# Patient Record
Sex: FEMALE | Race: OTHER | NOT HISPANIC OR LATINO | URBAN - METROPOLITAN AREA
[De-identification: names, ages, dates, MRNs, and addresses within clinical notes are randomized per-mention and may not be internally consistent; named-entity substitution may affect disease eponyms.]

---

## 2018-07-18 ENCOUNTER — OUTPATIENT (OUTPATIENT)
Dept: OUTPATIENT SERVICES | Facility: HOSPITAL | Age: 83
LOS: 1 days | End: 2018-07-18
Payer: MEDICARE

## 2018-07-18 ENCOUNTER — APPOINTMENT (OUTPATIENT)
Dept: RADIOLOGY | Facility: CLINIC | Age: 83
End: 2018-07-18
Payer: MEDICARE

## 2018-07-18 DIAGNOSIS — Z00.8 ENCOUNTER FOR OTHER GENERAL EXAMINATION: ICD-10-CM

## 2018-07-18 PROCEDURE — 73562 X-RAY EXAM OF KNEE 3: CPT | Mod: 26,50

## 2018-07-18 PROCEDURE — 73562 X-RAY EXAM OF KNEE 3: CPT

## 2019-09-17 ENCOUNTER — APPOINTMENT (OUTPATIENT)
Dept: ORTHOPEDIC SURGERY | Facility: CLINIC | Age: 84
End: 2019-09-17
Payer: MEDICARE

## 2019-09-17 VITALS
HEART RATE: 79 BPM | BODY MASS INDEX: 27.42 KG/M2 | SYSTOLIC BLOOD PRESSURE: 151 MMHG | HEIGHT: 59 IN | DIASTOLIC BLOOD PRESSURE: 80 MMHG | WEIGHT: 136 LBS

## 2019-09-17 DIAGNOSIS — M17.12 UNILATERAL PRIMARY OSTEOARTHRITIS, LEFT KNEE: ICD-10-CM

## 2019-09-17 DIAGNOSIS — M17.11 UNILATERAL PRIMARY OSTEOARTHRITIS, RIGHT KNEE: ICD-10-CM

## 2019-09-17 DIAGNOSIS — M25.562 PAIN IN LEFT KNEE: ICD-10-CM

## 2019-09-17 DIAGNOSIS — M25.561 PAIN IN RIGHT KNEE: ICD-10-CM

## 2019-09-17 DIAGNOSIS — Z78.9 OTHER SPECIFIED HEALTH STATUS: ICD-10-CM

## 2019-09-17 PROCEDURE — 72170 X-RAY EXAM OF PELVIS: CPT | Mod: 59

## 2019-09-17 PROCEDURE — 73562 X-RAY EXAM OF KNEE 3: CPT | Mod: 50

## 2019-09-17 PROCEDURE — 99203 OFFICE O/P NEW LOW 30 MIN: CPT

## 2019-12-17 ENCOUNTER — RESULT REVIEW (OUTPATIENT)
Age: 84
End: 2019-12-17

## 2019-12-17 ENCOUNTER — APPOINTMENT (OUTPATIENT)
Dept: ULTRASOUND IMAGING | Facility: IMAGING CENTER | Age: 84
End: 2019-12-17
Payer: MEDICARE

## 2019-12-17 ENCOUNTER — OUTPATIENT (OUTPATIENT)
Dept: OUTPATIENT SERVICES | Facility: HOSPITAL | Age: 84
LOS: 1 days | End: 2019-12-17
Payer: MEDICARE

## 2019-12-17 DIAGNOSIS — R92.8 OTHER ABNORMAL AND INCONCLUSIVE FINDINGS ON DIAGNOSTIC IMAGING OF BREAST: ICD-10-CM

## 2019-12-17 PROCEDURE — 77065 DX MAMMO INCL CAD UNI: CPT

## 2019-12-17 PROCEDURE — 88360 TUMOR IMMUNOHISTOCHEM/MANUAL: CPT | Mod: 26

## 2019-12-17 PROCEDURE — A4648: CPT

## 2019-12-17 PROCEDURE — 88342 IMHCHEM/IMCYTCHM 1ST ANTB: CPT

## 2019-12-17 PROCEDURE — 88341 IMHCHEM/IMCYTCHM EA ADD ANTB: CPT | Mod: 26,59

## 2019-12-17 PROCEDURE — 88342 IMHCHEM/IMCYTCHM 1ST ANTB: CPT | Mod: 26,59

## 2019-12-17 PROCEDURE — 19083 BX BREAST 1ST LESION US IMAG: CPT | Mod: RT

## 2019-12-17 PROCEDURE — 77065 DX MAMMO INCL CAD UNI: CPT | Mod: 26,RT

## 2019-12-17 PROCEDURE — 19083 BX BREAST 1ST LESION US IMAG: CPT

## 2019-12-17 PROCEDURE — 88341 IMHCHEM/IMCYTCHM EA ADD ANTB: CPT

## 2019-12-17 PROCEDURE — 88305 TISSUE EXAM BY PATHOLOGIST: CPT | Mod: 26

## 2019-12-17 PROCEDURE — 88360 TUMOR IMMUNOHISTOCHEM/MANUAL: CPT

## 2019-12-17 PROCEDURE — 88305 TISSUE EXAM BY PATHOLOGIST: CPT

## 2020-01-06 ENCOUNTER — APPOINTMENT (OUTPATIENT)
Dept: SURGERY | Facility: CLINIC | Age: 85
End: 2020-01-06
Payer: MEDICARE

## 2020-01-06 PROCEDURE — 99205K: CUSTOM

## 2020-01-16 ENCOUNTER — OUTPATIENT (OUTPATIENT)
Dept: OUTPATIENT SERVICES | Facility: HOSPITAL | Age: 85
LOS: 1 days | End: 2020-01-16
Payer: MEDICARE

## 2020-01-16 ENCOUNTER — OUTPATIENT (OUTPATIENT)
Dept: OUTPATIENT SERVICES | Facility: HOSPITAL | Age: 85
LOS: 1 days | End: 2020-01-16

## 2020-01-16 ENCOUNTER — APPOINTMENT (OUTPATIENT)
Dept: ULTRASOUND IMAGING | Facility: IMAGING CENTER | Age: 85
End: 2020-01-16

## 2020-01-16 VITALS
HEART RATE: 77 BPM | HEIGHT: 59 IN | TEMPERATURE: 99 F | SYSTOLIC BLOOD PRESSURE: 118 MMHG | DIASTOLIC BLOOD PRESSURE: 80 MMHG | RESPIRATION RATE: 14 BRPM | OXYGEN SATURATION: 96 % | WEIGHT: 138.01 LBS

## 2020-01-16 DIAGNOSIS — Z90.710 ACQUIRED ABSENCE OF BOTH CERVIX AND UTERUS: Chronic | ICD-10-CM

## 2020-01-16 DIAGNOSIS — C50.911 MALIGNANT NEOPLASM OF UNSPECIFIED SITE OF RIGHT FEMALE BREAST: ICD-10-CM

## 2020-01-16 DIAGNOSIS — Z98.890 OTHER SPECIFIED POSTPROCEDURAL STATES: Chronic | ICD-10-CM

## 2020-01-16 DIAGNOSIS — Z00.8 ENCOUNTER FOR OTHER GENERAL EXAMINATION: ICD-10-CM

## 2020-01-16 LAB
ALBUMIN SERPL ELPH-MCNC: 4.1 G/DL — SIGNIFICANT CHANGE UP (ref 3.3–5)
ALP SERPL-CCNC: 75 U/L — SIGNIFICANT CHANGE UP (ref 40–120)
ALT FLD-CCNC: 12 U/L — SIGNIFICANT CHANGE UP (ref 4–33)
ANION GAP SERPL CALC-SCNC: 13 MMO/L — SIGNIFICANT CHANGE UP (ref 7–14)
AST SERPL-CCNC: 17 U/L — SIGNIFICANT CHANGE UP (ref 4–32)
BILIRUB SERPL-MCNC: 0.7 MG/DL — SIGNIFICANT CHANGE UP (ref 0.2–1.2)
BUN SERPL-MCNC: 13 MG/DL — SIGNIFICANT CHANGE UP (ref 7–23)
CALCIUM SERPL-MCNC: 9.9 MG/DL — SIGNIFICANT CHANGE UP (ref 8.4–10.5)
CHLORIDE SERPL-SCNC: 99 MMOL/L — SIGNIFICANT CHANGE UP (ref 98–107)
CO2 SERPL-SCNC: 26 MMOL/L — SIGNIFICANT CHANGE UP (ref 22–31)
CREAT SERPL-MCNC: 0.66 MG/DL — SIGNIFICANT CHANGE UP (ref 0.5–1.3)
GLUCOSE SERPL-MCNC: 84 MG/DL — SIGNIFICANT CHANGE UP (ref 70–99)
HCT VFR BLD CALC: 43 % — SIGNIFICANT CHANGE UP (ref 34.5–45)
HGB BLD-MCNC: 14.1 G/DL — SIGNIFICANT CHANGE UP (ref 11.5–15.5)
MCHC RBC-ENTMCNC: 29.3 PG — SIGNIFICANT CHANGE UP (ref 27–34)
MCHC RBC-ENTMCNC: 32.8 % — SIGNIFICANT CHANGE UP (ref 32–36)
MCV RBC AUTO: 89.4 FL — SIGNIFICANT CHANGE UP (ref 80–100)
NRBC # FLD: 0 K/UL — SIGNIFICANT CHANGE UP (ref 0–0)
PLATELET # BLD AUTO: 160 K/UL — SIGNIFICANT CHANGE UP (ref 150–400)
PMV BLD: 11.9 FL — SIGNIFICANT CHANGE UP (ref 7–13)
POTASSIUM SERPL-MCNC: 3.8 MMOL/L — SIGNIFICANT CHANGE UP (ref 3.5–5.3)
POTASSIUM SERPL-SCNC: 3.8 MMOL/L — SIGNIFICANT CHANGE UP (ref 3.5–5.3)
PROT SERPL-MCNC: 6.9 G/DL — SIGNIFICANT CHANGE UP (ref 6–8.3)
RBC # BLD: 4.81 M/UL — SIGNIFICANT CHANGE UP (ref 3.8–5.2)
RBC # FLD: 13.7 % — SIGNIFICANT CHANGE UP (ref 10.3–14.5)
SODIUM SERPL-SCNC: 138 MMOL/L — SIGNIFICANT CHANGE UP (ref 135–145)
WBC # BLD: 5.69 K/UL — SIGNIFICANT CHANGE UP (ref 3.8–10.5)
WBC # FLD AUTO: 5.69 K/UL — SIGNIFICANT CHANGE UP (ref 3.8–10.5)

## 2020-01-16 PROCEDURE — C1739: CPT

## 2020-01-16 PROCEDURE — 19285 PERQ DEV BREAST 1ST US IMAG: CPT

## 2020-01-16 PROCEDURE — 19285 PERQ DEV BREAST 1ST US IMAG: CPT | Mod: RT

## 2020-01-16 RX ORDER — SODIUM CHLORIDE 9 MG/ML
1000 INJECTION, SOLUTION INTRAVENOUS
Refills: 0 | Status: DISCONTINUED | OUTPATIENT
Start: 2020-01-28 | End: 2020-02-17

## 2020-01-16 NOTE — H&P PST ADULT - HISTORY OF PRESENT ILLNESS
85 year female presents to presurgical testing with diagnosis of malignant neoplasm of unspecified site of right female breast scheduled for right partial mastectomy with seed localization sentinel lymph node biopsy for 1/28/20. Pt with abnormal mammogram, s/p abnormal breast biopsy. Pt denies breast lumps or nipple discharge.

## 2020-01-16 NOTE — H&P PST ADULT - NEGATIVE OPHTHALMOLOGIC SYMPTOMS
no blurred vision L/no pain L/no diplopia/no blurred vision R/no pain R/no loss of vision L/no loss of vision R/no photophobia

## 2020-01-16 NOTE — H&P PST ADULT - RADIOLOGY RESULTS AND INTERPRETATION
CXR ordered as per surgeon CXR in chart from PCP from 11/2019, called Dr Flores office spoke to surgical coordinator and stated no further CXR is needed

## 2020-01-16 NOTE — H&P PST ADULT - SOURCE OF INFORMATION, PROFILE
julio. Pt is Upper sorbian speaking accompanied by daughter. Offered interpretation services but patient declined and preferred to use daughter for interpretation/patient

## 2020-01-16 NOTE — H&P PST ADULT - ASSESSMENT
malignant neoplasm of unspecified site of right female breast Problem: malignant neoplasm of unspecified site of right female breast   Assessment and Plan: Pt is scheduled for right partial mastectomy with seed localization sentinel lymph node biopsy for 1/28/20. Pre-op instructions provided. Pt given verbal and written instructions with teach back on chlorhexidine shampoo and pepcid. Pt verbalized understanding with return demonstration.     Consider obtaining a copy of recent echo from PCP.    Problem: Asthma  Assessment and Plan: Pt instructed to use advair on the morning of procedure.

## 2020-01-16 NOTE — H&P PST ADULT - RS GEN PE MLT RESP DETAILS PC
airway patent/respirations non-labored/good air movement/no rhonchi/clear to auscultation bilaterally/breath sounds equal/no wheezes/no rales

## 2020-01-16 NOTE — H&P PST ADULT - NEGATIVE GASTROINTESTINAL SYMPTOMS
no change in bowel habits/no nausea/no diarrhea/no vomiting/no constipation/no abdominal pain/no melena

## 2020-01-16 NOTE — H&P PST ADULT - LANGUAGE ASSISTANCE NEEDED
No-Patient/Caregiver offered and refused free interpretation services. Sue # [id number]/No-Patient/Caregiver offered and refused free interpretation services.

## 2020-01-16 NOTE — H&P PST ADULT - NSANTHOSAYNRD_GEN_A_CORE
No. KEANU screening performed.  STOP BANG Legend: 0-2 = LOW Risk; 3-4 = INTERMEDIATE Risk; 5-8 = HIGH Risk

## 2020-01-16 NOTE — H&P PST ADULT - NSICDXPASTMEDICALHX_GEN_ALL_CORE_FT
PAST MEDICAL HISTORY:  Arthritis     Asthma     Malignant neoplasm of unspecified site of right female breast

## 2020-01-16 NOTE — H&P PST ADULT - NEGATIVE NEUROLOGICAL SYMPTOMS
no difficulty walking/no transient paralysis/no generalized seizures/no weakness/no paresthesias/no syncope/no tremors/no headache

## 2020-01-16 NOTE — H&P PST ADULT - NSICDXPROBLEM_GEN_ALL_CORE_FT
PROBLEM DIAGNOSES  Problem: Malignant neoplasm of unspecified site of right female breast  Assessment and Plan: Pt is scheduled for right partial mastectomy with seed localization sentinel lymph node biopsy for 1/28/20. Pre-op instructions provided. Pt and daughter given verbal and written instructions with teach back on chlorhexidine shampoo and pepcid. Pt and daughter verbalized understanding with return demonstration.   Consider obtaining last echo from about 3 months ago from

## 2020-01-16 NOTE — H&P PST ADULT - NEGATIVE ENMT SYMPTOMS
no tinnitus/no sinus symptoms/no hearing difficulty/no ear pain/no throat pain/no nose bleeds/no dysphagia/no vertigo

## 2020-01-24 PROBLEM — M19.90 UNSPECIFIED OSTEOARTHRITIS, UNSPECIFIED SITE: Chronic | Status: ACTIVE | Noted: 2020-01-16

## 2020-01-24 PROBLEM — J45.909 UNSPECIFIED ASTHMA, UNCOMPLICATED: Chronic | Status: ACTIVE | Noted: 2020-01-16

## 2020-01-24 PROBLEM — C50.911 MALIGNANT NEOPLASM OF UNSPECIFIED SITE OF RIGHT FEMALE BREAST: Chronic | Status: ACTIVE | Noted: 2020-01-16

## 2020-01-28 ENCOUNTER — APPOINTMENT (OUTPATIENT)
Dept: SURGERY | Facility: HOSPITAL | Age: 85
End: 2020-01-28

## 2020-01-28 ENCOUNTER — OUTPATIENT (OUTPATIENT)
Dept: OUTPATIENT SERVICES | Facility: HOSPITAL | Age: 85
LOS: 1 days | Discharge: HOME | End: 2020-01-28
Payer: MEDICARE

## 2020-01-28 ENCOUNTER — APPOINTMENT (OUTPATIENT)
Dept: NUCLEAR MEDICINE | Facility: HOSPITAL | Age: 85
End: 2020-01-28

## 2020-01-28 ENCOUNTER — RESULT REVIEW (OUTPATIENT)
Age: 85
End: 2020-01-28

## 2020-01-28 VITALS
DIASTOLIC BLOOD PRESSURE: 62 MMHG | OXYGEN SATURATION: 95 % | TEMPERATURE: 98 F | HEART RATE: 74 BPM | SYSTOLIC BLOOD PRESSURE: 142 MMHG | RESPIRATION RATE: 19 BRPM

## 2020-01-28 VITALS
OXYGEN SATURATION: 96 % | TEMPERATURE: 98 F | HEART RATE: 79 BPM | WEIGHT: 138.01 LBS | DIASTOLIC BLOOD PRESSURE: 74 MMHG | SYSTOLIC BLOOD PRESSURE: 154 MMHG | RESPIRATION RATE: 18 BRPM | HEIGHT: 59 IN

## 2020-01-28 DIAGNOSIS — Z98.890 OTHER SPECIFIED POSTPROCEDURAL STATES: Chronic | ICD-10-CM

## 2020-01-28 DIAGNOSIS — C50.911 MALIGNANT NEOPLASM OF UNSPECIFIED SITE OF RIGHT FEMALE BREAST: ICD-10-CM

## 2020-01-28 DIAGNOSIS — Z90.710 ACQUIRED ABSENCE OF BOTH CERVIX AND UTERUS: Chronic | ICD-10-CM

## 2020-01-28 DIAGNOSIS — J45.909 UNSPECIFIED ASTHMA, UNCOMPLICATED: ICD-10-CM

## 2020-01-28 PROCEDURE — 88305 TISSUE EXAM BY PATHOLOGIST: CPT | Mod: 26

## 2020-01-28 PROCEDURE — 19301K: CUSTOM | Mod: RT

## 2020-01-28 PROCEDURE — 38525K: CUSTOM | Mod: RT

## 2020-01-28 PROCEDURE — 38792K: CUSTOM | Mod: RT

## 2020-01-28 PROCEDURE — 76098 X-RAY EXAM SURGICAL SPECIMEN: CPT | Mod: 26

## 2020-01-28 PROCEDURE — 88307 TISSUE EXAM BY PATHOLOGIST: CPT | Mod: 26

## 2020-01-28 NOTE — BRIEF OPERATIVE NOTE - OPERATION/FINDINGS
Right breast partial mastectomy via Perla  localization; incision over lesion on lateral breast. Rainelle lymph node biopsy via axillary incision. Post excision radiography confirmed both perla  and biopsy clip within specimen.

## 2020-01-28 NOTE — ASU PATIENT PROFILE, ADULT - LANGUAGE ASSISTANCE NEEDED
Sue # [id number] / Peggy #[phone] on day of surgery/No-Patient/Caregiver offered and refused free interpretation services.

## 2020-01-28 NOTE — ASU DISCHARGE PLAN (ADULT/PEDIATRIC) - CALL YOUR DOCTOR IF YOU HAVE ANY OF THE FOLLOWING:
Swelling that gets worse/Fever greater than (need to indicate Fahrenheit or Celsius)/Wound/Surgical Site with redness, or foul smelling discharge or pus

## 2020-01-28 NOTE — ASU DISCHARGE PLAN (ADULT/PEDIATRIC) - ASU DC SPECIAL INSTRUCTIONSFT
Please see pre-printed instructions from your surgeon.     Please call your surgeon to schedule your follow up appointment for one week from your operation. Please see pre-printed instructions from your surgeon.     Please call your surgeon to schedule your follow up appointment for one week from your operation. You received iv tylenol in or You must not take tylenol until after 430 pm

## 2020-01-28 NOTE — ASU DISCHARGE PLAN (ADULT/PEDIATRIC) - CARE PROVIDER_API CALL
Yudi Flores)  [facility]  [address] [facility], [address]  [place], [place] [id number]  Phone: [phone]  Fax: [phone]  Follow Up Time:

## 2020-01-30 LAB — SURGICAL PATHOLOGY STUDY: SIGNIFICANT CHANGE UP

## 2020-02-06 ENCOUNTER — APPOINTMENT (OUTPATIENT)
Dept: SURGERY | Facility: CLINIC | Age: 85
End: 2020-02-06
Payer: MEDICARE

## 2020-02-06 PROCEDURE — 99024 POSTOP FOLLOW-UP VISIT: CPT

## 2020-02-12 ENCOUNTER — OUTPATIENT (OUTPATIENT)
Dept: OUTPATIENT SERVICES | Facility: HOSPITAL | Age: 85
LOS: 1 days | Discharge: HOME | End: 2020-02-12

## 2020-02-12 DIAGNOSIS — Z98.890 OTHER SPECIFIED POSTPROCEDURAL STATES: Chronic | ICD-10-CM

## 2020-02-12 DIAGNOSIS — Z90.710 ACQUIRED ABSENCE OF BOTH CERVIX AND UTERUS: Chronic | ICD-10-CM

## 2020-02-12 DIAGNOSIS — C50.911 MALIGNANT NEOPLASM OF UNSPECIFIED SITE OF RIGHT FEMALE BREAST: ICD-10-CM

## 2020-02-18 ENCOUNTER — APPOINTMENT (OUTPATIENT)
Dept: HEMATOLOGY ONCOLOGY | Facility: CLINIC | Age: 85
End: 2020-02-18
Payer: MEDICARE

## 2020-02-18 VITALS
RESPIRATION RATE: 16 BRPM | HEIGHT: 59.84 IN | DIASTOLIC BLOOD PRESSURE: 83 MMHG | WEIGHT: 137.77 LBS | SYSTOLIC BLOOD PRESSURE: 146 MMHG | TEMPERATURE: 99.3 F | OXYGEN SATURATION: 96 % | BODY MASS INDEX: 27.05 KG/M2 | HEART RATE: 80 BPM

## 2020-02-18 DIAGNOSIS — C50.919 MALIGNANT NEOPLASM OF UNSPECIFIED SITE OF UNSPECIFIED FEMALE BREAST: ICD-10-CM

## 2020-02-18 PROCEDURE — 99205 OFFICE O/P NEW HI 60 MIN: CPT

## 2021-01-05 ENCOUNTER — RESULT REVIEW (OUTPATIENT)
Age: 86
End: 2021-01-05

## 2021-01-05 ENCOUNTER — OUTPATIENT (OUTPATIENT)
Dept: OUTPATIENT SERVICES | Facility: HOSPITAL | Age: 86
LOS: 1 days | End: 2021-01-05
Payer: MEDICARE

## 2021-01-05 ENCOUNTER — APPOINTMENT (OUTPATIENT)
Dept: ULTRASOUND IMAGING | Facility: IMAGING CENTER | Age: 86
End: 2021-01-05
Payer: MEDICARE

## 2021-01-05 ENCOUNTER — APPOINTMENT (OUTPATIENT)
Dept: MAMMOGRAPHY | Facility: IMAGING CENTER | Age: 86
End: 2021-01-05
Payer: MEDICARE

## 2021-01-05 DIAGNOSIS — Z98.890 OTHER SPECIFIED POSTPROCEDURAL STATES: Chronic | ICD-10-CM

## 2021-01-05 DIAGNOSIS — Z90.710 ACQUIRED ABSENCE OF BOTH CERVIX AND UTERUS: Chronic | ICD-10-CM

## 2021-01-05 DIAGNOSIS — Z00.8 ENCOUNTER FOR OTHER GENERAL EXAMINATION: ICD-10-CM

## 2021-01-05 PROCEDURE — 76641 ULTRASOUND BREAST COMPLETE: CPT

## 2021-01-05 PROCEDURE — 77066 DX MAMMO INCL CAD BI: CPT | Mod: 26

## 2021-01-05 PROCEDURE — 76641 ULTRASOUND BREAST COMPLETE: CPT | Mod: 26,50

## 2021-01-05 PROCEDURE — G0279: CPT

## 2021-01-05 PROCEDURE — 77066 DX MAMMO INCL CAD BI: CPT

## 2021-01-05 PROCEDURE — G0279: CPT | Mod: 26

## 2022-02-28 ENCOUNTER — APPOINTMENT (OUTPATIENT)
Dept: SURGERY | Facility: CLINIC | Age: 87
End: 2022-02-28
Payer: MEDICARE

## 2022-02-28 PROCEDURE — 99213K: CUSTOM

## 2022-03-07 ENCOUNTER — APPOINTMENT (OUTPATIENT)
Dept: ULTRASOUND IMAGING | Facility: IMAGING CENTER | Age: 87
End: 2022-03-07
Payer: MEDICARE

## 2022-03-07 ENCOUNTER — APPOINTMENT (OUTPATIENT)
Dept: MAMMOGRAPHY | Facility: IMAGING CENTER | Age: 87
End: 2022-03-07
Payer: MEDICARE

## 2022-03-07 ENCOUNTER — OUTPATIENT (OUTPATIENT)
Dept: OUTPATIENT SERVICES | Facility: HOSPITAL | Age: 87
LOS: 1 days | End: 2022-03-07
Payer: MEDICARE

## 2022-03-07 DIAGNOSIS — Z00.8 ENCOUNTER FOR OTHER GENERAL EXAMINATION: ICD-10-CM

## 2022-03-07 DIAGNOSIS — Z98.890 OTHER SPECIFIED POSTPROCEDURAL STATES: Chronic | ICD-10-CM

## 2022-03-07 DIAGNOSIS — Z90.710 ACQUIRED ABSENCE OF BOTH CERVIX AND UTERUS: Chronic | ICD-10-CM

## 2022-03-07 PROCEDURE — 77066 DX MAMMO INCL CAD BI: CPT | Mod: 26

## 2022-03-07 PROCEDURE — 76641 ULTRASOUND BREAST COMPLETE: CPT | Mod: 26,50

## 2022-03-07 PROCEDURE — 76641 ULTRASOUND BREAST COMPLETE: CPT

## 2022-03-07 PROCEDURE — G0279: CPT | Mod: 26

## 2022-03-07 PROCEDURE — 77066 DX MAMMO INCL CAD BI: CPT

## 2022-03-07 PROCEDURE — G0279: CPT

## 2022-04-23 ENCOUNTER — TRANSCRIPTION ENCOUNTER (OUTPATIENT)
Age: 87
End: 2022-04-23

## 2022-04-25 ENCOUNTER — APPOINTMENT (OUTPATIENT)
Dept: ORTHOPEDIC SURGERY | Facility: CLINIC | Age: 87
End: 2022-04-25
Payer: MEDICARE

## 2022-04-25 ENCOUNTER — NON-APPOINTMENT (OUTPATIENT)
Age: 87
End: 2022-04-25

## 2022-04-25 VITALS
SYSTOLIC BLOOD PRESSURE: 126 MMHG | DIASTOLIC BLOOD PRESSURE: 79 MMHG | WEIGHT: 130 LBS | BODY MASS INDEX: 27.29 KG/M2 | HEART RATE: 75 BPM | HEIGHT: 58 IN

## 2022-04-25 DIAGNOSIS — Z85.3 PERSONAL HISTORY OF MALIGNANT NEOPLASM OF BREAST: ICD-10-CM

## 2022-04-25 DIAGNOSIS — M51.37 OTHER INTERVERTEBRAL DISC DEGENERATION, LUMBOSACRAL REGION: ICD-10-CM

## 2022-04-25 DIAGNOSIS — M84.48XA PATHOLOGICAL FRACTURE, OTHER SITE, INITIAL ENCOUNTER FOR FRACTURE: ICD-10-CM

## 2022-04-25 DIAGNOSIS — M54.50 LOW BACK PAIN, UNSPECIFIED: ICD-10-CM

## 2022-04-25 PROCEDURE — 96372 THER/PROPH/DIAG INJ SC/IM: CPT

## 2022-04-25 PROCEDURE — 99214 OFFICE O/P EST MOD 30 MIN: CPT | Mod: 25

## 2022-04-25 RX ORDER — FLUTICASONE PROPIONATE AND SALMETEROL 50; 250 UG/1; UG/1
250-50 POWDER RESPIRATORY (INHALATION)
Qty: 60 | Refills: 0 | Status: ACTIVE | COMMUNITY
Start: 2021-01-13

## 2022-04-25 RX ORDER — CELECOXIB 100 MG/1
100 CAPSULE ORAL TWICE DAILY
Qty: 60 | Refills: 0 | Status: ACTIVE | COMMUNITY
Start: 2022-04-25 | End: 1900-01-01

## 2022-04-25 RX ORDER — DICLOFENAC SODIUM 1% 10 MG/G
1 GEL TOPICAL
Qty: 100 | Refills: 0 | Status: ACTIVE | COMMUNITY
Start: 2022-02-04

## 2022-04-25 RX ORDER — ANASTROZOLE TABLETS 1 MG/1
1 TABLET ORAL DAILY
Qty: 30 | Refills: 4 | Status: DISCONTINUED | COMMUNITY
Start: 2020-02-18 | End: 2022-04-25

## 2022-04-25 RX ORDER — DICLOFENAC SODIUM 3 G/100G
3 GEL TOPICAL TWICE DAILY
Qty: 1 | Refills: 0 | Status: ACTIVE | COMMUNITY
Start: 2022-04-25 | End: 1900-01-01

## 2022-04-25 RX ORDER — CIPROFLOXACIN HYDROCHLORIDE 500 MG/1
500 TABLET, FILM COATED ORAL
Qty: 20 | Refills: 0 | Status: DISCONTINUED | COMMUNITY
Start: 2022-01-20 | End: 2022-04-25

## 2022-04-25 RX ORDER — KETOROLAC TROMETHAMINE 30 MG/ML
30 INJECTION, SOLUTION INTRAMUSCULAR; INTRAVENOUS
Qty: 1 | Refills: 0 | Status: COMPLETED | OUTPATIENT
Start: 2022-04-25

## 2022-04-25 RX ORDER — CELECOXIB 50 MG/1
CAPSULE ORAL
Refills: 0 | Status: DISCONTINUED | COMMUNITY
End: 2022-04-25

## 2022-04-25 RX ORDER — ZOLPIDEM TARTRATE 5 MG/1
TABLET, FILM COATED ORAL
Refills: 0 | Status: DISCONTINUED | COMMUNITY
End: 2022-04-25

## 2022-04-25 RX ORDER — TIZANIDINE 2 MG/1
2 TABLET ORAL EVERY 8 HOURS
Qty: 30 | Refills: 2 | Status: ACTIVE | COMMUNITY
Start: 2022-04-25 | End: 1900-01-01

## 2022-04-25 RX ORDER — TIZANIDINE 2 MG/1
2 TABLET ORAL EVERY 8 HOURS
Qty: 30 | Refills: 2 | Status: DISCONTINUED | COMMUNITY
Start: 2022-04-25 | End: 2022-04-25

## 2022-04-25 RX ORDER — CYCLOBENZAPRINE HYDROCHLORIDE 5 MG/1
5 TABLET, FILM COATED ORAL
Qty: 14 | Refills: 0 | Status: ACTIVE | COMMUNITY
Start: 2022-04-21

## 2022-04-25 RX ORDER — FLUTICASONE PROPIONATE AND SALMETEROL XINAFOATE 45; 21 UG/1; UG/1
45-21 AEROSOL, METERED RESPIRATORY (INHALATION)
Qty: 12 | Refills: 0 | Status: ACTIVE | COMMUNITY
Start: 2022-02-04

## 2022-04-25 RX ADMIN — KETOROLAC TROMETHAMINE 0 MG/ML: 30 INJECTION, SOLUTION INTRAMUSCULAR; INTRAVENOUS at 00:00

## 2022-04-25 NOTE — DISCUSSION/SUMMARY
[Medication Risks Reviewed] : Medication risks reviewed [de-identified] : Patient presents with symptoms consistent with low back pain in the setting of a fall at home approximately 10 days ago.  X-rays obtained previously demonstrate suggestion of possible inferior endplate compression deformity of the L2 vertebral body.  Prescribed her lumbosacral orthosis.  Recommend MRI lumbar spine for further evaluation.  Offered injection of Toradol for her pain and she wanted to proceed.  Under sterile conditions 30 mg Toradol administered intermuscularly by the LPN without incident.  Prescribed her Celebrex and tizanidine for symptomatic relief.  At her request also prescribed her diclofenac gel.  Prescribed her lumbosacral orthosis.  Further treatment option including kyphoplasty may be considered after the MRI is been performed.

## 2022-04-25 NOTE — PHYSICAL EXAM
[Stooped] : stooped [LE] : Sensory: Intact in bilateral lower extremities [0] : left ankle jerk 0 [DP] : dorsalis pedis 2+ and symmetric bilaterally [SLR] : negative straight leg raise [Plantar Reflex Right Only] : absent on the right [Plantar Reflex Left Only] : absent on the left [DTR Reflexes Clonus Of Right Ankle (___ Beats)] : absent on the right [DTR Reflexes Clonus Of Left Ankle (___ Beats)] : absent on the left [de-identified] : The pt is awake, alert and oriented to self, place and time, is comfortable and in no acute distress. Inspection of neck, back and lower extremities bilaterally reveals no rashes or ecchymotic lesions.  There is no obvious abnormal spinal curvature in the sagittal and coronal planes. There is no tenderness over the cervical, thoracic spine, or the upper and lower extremities musculature. There is no sacroiliac tenderness. No greater trochanteric tenderness bilaterally. No atrophy or abnormal movements noted in the upper or lower extremities. There is no swelling noted in the upper or lower extremities bilaterally. No cervical lymphadenopathy noted anteriorly. No joint laxity noted in the upper and lower extremity joints bilaterally.\par Hip range of motion is degrees internal rotation 30° external rotation without pain. Full range of motion of the shoulders bilaterally with no significant pain\par There is no groin pain with hip internal rotation and a negative JONATAN test bilaterally.  [de-identified] : seen with her daughter\par tenderness over lumbar spine miline and right buttock and paraspinal lumbar [de-identified] : AP and lateral image of the lumbar spine On April 21, 2022 at outside facility demonstrates normal lumbar lordosis with degenerative changes and spondylolisthesis grade 1 at L4-5 and L5-S1.  Degeneration also noted L3-4.  Degenerative changes at L2 with loss of inferior endplate compressive deformity approximately 20%.  Age-indeterminate.  Degeneration with retrolisthesis at L1 to and T12-L1.  Thoracolumbar kyphosis.  Anterior osteophytes.  No significant scoliosis.  Lateral osteophytes noted in the thoracic region.

## 2022-04-25 NOTE — HISTORY OF PRESENT ILLNESS
[Worsening] : worsening [10] : a current pain level of 10/10 [Sitting] : sitting [Daily] : ~He/She~ states the symptoms seem to be occuring daily [Prolonged Sitting] : worsened by prolonged sitting [de-identified] : Patient is here today due to falling backwards landed on her buttocks in grass in the garden in their home approximately 10 days ago. Took advil. 2-3 days later  went to Urgent Care xrays were done given medication which she did not take. Patient uses advil and topical pain cream. [de-identified] : sitting to standing then walking [de-identified] : advil and topical rub

## 2022-04-29 ENCOUNTER — APPOINTMENT (OUTPATIENT)
Dept: MRI IMAGING | Facility: IMAGING CENTER | Age: 87
End: 2022-04-29
Payer: MEDICARE

## 2022-04-29 ENCOUNTER — OUTPATIENT (OUTPATIENT)
Dept: OUTPATIENT SERVICES | Facility: HOSPITAL | Age: 87
LOS: 1 days | End: 2022-04-29
Payer: MEDICARE

## 2022-04-29 DIAGNOSIS — Z90.710 ACQUIRED ABSENCE OF BOTH CERVIX AND UTERUS: Chronic | ICD-10-CM

## 2022-04-29 DIAGNOSIS — M51.37 OTHER INTERVERTEBRAL DISC DEGENERATION, LUMBOSACRAL REGION: ICD-10-CM

## 2022-04-29 DIAGNOSIS — Z98.890 OTHER SPECIFIED POSTPROCEDURAL STATES: Chronic | ICD-10-CM

## 2022-04-29 PROCEDURE — 72148 MRI LUMBAR SPINE W/O DYE: CPT | Mod: 26,ME

## 2022-04-29 PROCEDURE — G1004: CPT

## 2022-04-29 PROCEDURE — 72148 MRI LUMBAR SPINE W/O DYE: CPT | Mod: ME

## 2022-05-02 ENCOUNTER — NON-APPOINTMENT (OUTPATIENT)
Age: 87
End: 2022-05-02

## 2022-05-02 DIAGNOSIS — S32.10XA UNSPECIFIED FRACTURE OF SACRUM, INITIAL ENCOUNTER FOR CLOSED FRACTURE: ICD-10-CM

## 2022-05-02 RX ORDER — TRAMADOL HYDROCHLORIDE AND ACETAMINOPHEN 37.5; 325 MG/1; MG/1
37.5-325 TABLET, FILM COATED ORAL
Qty: 20 | Refills: 0 | Status: ACTIVE | COMMUNITY
Start: 2022-05-02 | End: 1900-01-01

## 2022-05-13 ENCOUNTER — APPOINTMENT (OUTPATIENT)
Dept: ORTHOPEDIC SURGERY | Facility: CLINIC | Age: 87
End: 2022-05-13
Payer: MEDICARE

## 2022-05-13 VITALS
DIASTOLIC BLOOD PRESSURE: 82 MMHG | HEART RATE: 89 BPM | SYSTOLIC BLOOD PRESSURE: 127 MMHG | HEIGHT: 58 IN | BODY MASS INDEX: 26.87 KG/M2 | WEIGHT: 128 LBS

## 2022-05-13 DIAGNOSIS — M84.48XS PATHOLOGICAL FRACTURE, OTHER SITE, SEQUELA: ICD-10-CM

## 2022-05-13 DIAGNOSIS — M84.48XD PATHOLOGICAL FRACTURE, OTHER SITE, SUBSEQUENT ENCOUNTER FOR FRACTURE WITH ROUTINE HEALING: ICD-10-CM

## 2022-05-13 PROCEDURE — 99214 OFFICE O/P EST MOD 30 MIN: CPT

## 2022-05-13 RX ORDER — LIDOCAINE 5% 700 MG/1
5 PATCH TOPICAL
Qty: 1 | Refills: 2 | Status: ACTIVE | COMMUNITY
Start: 2022-05-13 | End: 1900-01-01

## 2022-05-13 NOTE — HISTORY OF PRESENT ILLNESS
[Worsening] : worsening [10] : a current pain level of 10/10 [Daily] : ~He/She~ states the symptoms seem to be occuring daily [None] : No relieving factors are noted [de-identified] : Patient is here today with her family members to discuss having surgery on her spine. Patient states in terrible pain wearing back wrap nothing is helping. [de-identified] : everything bothers her

## 2022-05-13 NOTE — PHYSICAL EXAM
[Stooped] : stooped [LE] : Sensory: Intact in bilateral lower extremities [0] : left ankle jerk 0 [DP] : dorsalis pedis 2+ and symmetric bilaterally [SLR] : negative straight leg raise [Plantar Reflex Right Only] : absent on the right [Plantar Reflex Left Only] : absent on the left [DTR Reflexes Clonus Of Right Ankle (___ Beats)] : absent on the right [DTR Reflexes Clonus Of Left Ankle (___ Beats)] : absent on the left [de-identified] : The pt is awake, alert and oriented to self, place and time, is comfortable and in no acute distress. Inspection of neck, back and lower extremities bilaterally reveals no rashes or ecchymotic lesions.  There is no obvious abnormal spinal curvature in the sagittal and coronal planes. There is no tenderness over the cervical, thoracic spine, or the upper and lower extremities musculature. There is no sacroiliac tenderness. No greater trochanteric tenderness bilaterally. No atrophy or abnormal movements noted in the upper or lower extremities. There is no swelling noted in the upper or lower extremities bilaterally. No cervical lymphadenopathy noted anteriorly. No joint laxity noted in the upper and lower extremity joints bilaterally.\par Hip range of motion is degrees internal rotation 30° external rotation without pain. Full range of motion of the shoulders bilaterally with no significant pain\par There is no groin pain with hip internal rotation and a negative JONATAN test bilaterally.  [de-identified] : seen with her sons\par tenderness over lumbar spine midline and right buttock and paraspinal lumbar [de-identified] : INTERPRETATION: MR LUMBAR SPINE AND COCCYX\par \par HISTORY: Injured one week ago with lumbar spine and coccygeal pain.\par \par TECHNIQUE: Multiplanar MRI of the lumbar spine and coccyx was performed without contrast using 10 sequences.\par \par COMPARISON: Lumbar spine x-rays dated 4/21/2022\par \par FINDINGS:\par \par OSSEOUS STRUCTURES\par  Fractures: Slight L1 and mild L2 inferior endplate compression fractures are present with marrow edema reflecting recent injury. Nondisplaced fracture involves the sacrococcygeal junction with marrow edema..\par  Alignment: Mild retrolisthesis is present at T12-L1, L1-L2, and L2-L3. There is slight anterolisthesis at L3-L4 and mild anterolisthesis at L4-L5 and L5-S1. Mild lumbar dextrocurvature is noted.\par  Marrow Signal: L1 hemangioma is noted and there are couple of small scattered Schmorl's nodes in addition to the fracture edema.\par \par SPINAL CORD\par  Signal: Normal.\par  Conus Medullaris: Terminates at L2-L3.\par \par DISC LEVELS\par T12-L1: Mild retrolisthesis is present with moderate to severe loss of disc height but no significant spinal canal stenosis.\par \par L1-L2: Mild retrolisthesis is present with moderate to severe loss of disc height ligamentum flavum hypertrophy contributing to mild spinal canal stenosis. Mild bilateral neural foraminal stenosis is present.\par \par L2-L3: Mild retrolisthesis is present with moderate loss of disc height and mild spinal canal stenosis. Mild bilateral neural foraminal stenosis is present.\par \par L3-L4: Slight anterolisthesis is present with moderate loss of disc height, disc bulging, but no significant central stenosis although there is mild to moderate narrowing of the left lateral recess. Moderate left neural foraminal stenosis is present.\par \par L4-L5: Mild anterolisthesis is present with mild loss of disc height, mild disc bulging, and ligamentum flavum hypertrophy but no significant spinal canal stenosis.\par \par L5-S1: Mild anterolisthesis is present with mild to moderate loss of disc height and mild disc bulging but no spinal canal stenosis. Mild to moderate bilateral neural foraminal stenosis is present. 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 Slight L1 and mild L2 inferior endplate compression fractures are seen with marrow edema reflecting recent injury.\par 2. Nondisplaced fracture also involves the sacrococcygeal junction.\par 3. Multilevel degenerative disc disease.\par 4. L1-L2 and L2-L3 demonstrate mild spinal canal stenosis\par 5. There is variable neural foraminal stenosis.\par \par --- End of Report ---\par \par GERMÁN HARDIN MD; Attending Radiologist\par This document has been electronically signed. Apr 29 2022 2:12PM

## 2022-05-13 NOTE — DISCUSSION/SUMMARY
[Medication Risks Reviewed] : Medication risks reviewed [de-identified] : Discussed option of kyphoplasty at L1, L2. The family would like to think about it and will call if they want to proceed with it. Lidocaine 5% Rx provided.  She will follow-up with me 2 weeks for repeat x-rays and to reassess her symptoms.  Recommend use of a TLSO which she has been finding helpful but not wearing regularly.  She does not report any pain along the coccyx suggesting her pain is primarily related to the L1 to compression fractures right of the sacrococcygeal injury also noted on the MRI.\par \par MRI performed previously was independently reviewed by me and findings discussed with the patient and her children.

## 2022-05-13 NOTE — REASON FOR VISIT
[Follow-Up Visit] : a follow-up visit for [Degenerative Joint Disease] : degenerative joint disease [Back Pain] : back pain [Family Member] : family member [FreeTextEntry2] : L2 compression fracture

## 2022-09-12 ENCOUNTER — OUTPATIENT (OUTPATIENT)
Dept: OUTPATIENT SERVICES | Facility: HOSPITAL | Age: 87
LOS: 1 days | End: 2022-09-12
Payer: MEDICARE

## 2022-09-12 ENCOUNTER — APPOINTMENT (OUTPATIENT)
Dept: MRI IMAGING | Facility: IMAGING CENTER | Age: 87
End: 2022-09-12

## 2022-09-12 DIAGNOSIS — G45.9 TRANSIENT CEREBRAL ISCHEMIC ATTACK, UNSPECIFIED: ICD-10-CM

## 2022-09-12 DIAGNOSIS — Z90.710 ACQUIRED ABSENCE OF BOTH CERVIX AND UTERUS: Chronic | ICD-10-CM

## 2022-09-12 DIAGNOSIS — Z98.890 OTHER SPECIFIED POSTPROCEDURAL STATES: Chronic | ICD-10-CM

## 2022-09-12 PROCEDURE — 70551 MRI BRAIN STEM W/O DYE: CPT

## 2022-09-12 PROCEDURE — 70551 MRI BRAIN STEM W/O DYE: CPT | Mod: 26,MH

## 2022-11-27 ENCOUNTER — INPATIENT (INPATIENT)
Facility: HOSPITAL | Age: 87
LOS: 1 days | Discharge: HOME | DRG: 57 | End: 2022-11-29
Attending: PSYCHIATRY & NEUROLOGY | Admitting: PSYCHIATRY & NEUROLOGY
Payer: MEDICARE

## 2022-11-27 VITALS
HEART RATE: 74 BPM | OXYGEN SATURATION: 97 % | DIASTOLIC BLOOD PRESSURE: 90 MMHG | WEIGHT: 130.95 LBS | SYSTOLIC BLOOD PRESSURE: 170 MMHG | RESPIRATION RATE: 18 BRPM

## 2022-11-27 DIAGNOSIS — Z98.890 OTHER SPECIFIED POSTPROCEDURAL STATES: Chronic | ICD-10-CM

## 2022-11-27 DIAGNOSIS — Z90.710 ACQUIRED ABSENCE OF BOTH CERVIX AND UTERUS: Chronic | ICD-10-CM

## 2022-11-27 DIAGNOSIS — R41.82 ALTERED MENTAL STATUS, UNSPECIFIED: ICD-10-CM

## 2022-11-27 LAB
ALBUMIN SERPL ELPH-MCNC: 3.9 G/DL — SIGNIFICANT CHANGE UP (ref 3.3–5)
ALP SERPL-CCNC: 90 U/L — SIGNIFICANT CHANGE UP (ref 40–120)
ALT FLD-CCNC: 7 U/L — LOW (ref 10–45)
ANION GAP SERPL CALC-SCNC: 11 MMOL/L — SIGNIFICANT CHANGE UP (ref 5–17)
APPEARANCE UR: CLEAR — SIGNIFICANT CHANGE UP
APTT BLD: 28.1 SEC — SIGNIFICANT CHANGE UP (ref 27.5–35.5)
AST SERPL-CCNC: 12 U/L — SIGNIFICANT CHANGE UP (ref 10–40)
BACTERIA # UR AUTO: NEGATIVE — SIGNIFICANT CHANGE UP
BASOPHILS # BLD AUTO: 0.05 K/UL — SIGNIFICANT CHANGE UP (ref 0–0.2)
BASOPHILS NFR BLD AUTO: 0.8 % — SIGNIFICANT CHANGE UP (ref 0–2)
BILIRUB SERPL-MCNC: 0.5 MG/DL — SIGNIFICANT CHANGE UP (ref 0.2–1.2)
BILIRUB UR-MCNC: NEGATIVE — SIGNIFICANT CHANGE UP
BUN SERPL-MCNC: 13 MG/DL — SIGNIFICANT CHANGE UP (ref 7–23)
CALCIUM SERPL-MCNC: 9.7 MG/DL — SIGNIFICANT CHANGE UP (ref 8.4–10.5)
CHLORIDE SERPL-SCNC: 102 MMOL/L — SIGNIFICANT CHANGE UP (ref 96–108)
CO2 SERPL-SCNC: 25 MMOL/L — SIGNIFICANT CHANGE UP (ref 22–31)
COLOR SPEC: COLORLESS — SIGNIFICANT CHANGE UP
CREAT SERPL-MCNC: 0.88 MG/DL — SIGNIFICANT CHANGE UP (ref 0.5–1.3)
DIFF PNL FLD: NEGATIVE — SIGNIFICANT CHANGE UP
EGFR: 64 ML/MIN/1.73M2 — SIGNIFICANT CHANGE UP
EOSINOPHIL # BLD AUTO: 0.05 K/UL — SIGNIFICANT CHANGE UP (ref 0–0.5)
EOSINOPHIL NFR BLD AUTO: 0.8 % — SIGNIFICANT CHANGE UP (ref 0–6)
EPI CELLS # UR: 1 /HPF — SIGNIFICANT CHANGE UP
FLUAV AG NPH QL: SIGNIFICANT CHANGE UP
FLUBV AG NPH QL: SIGNIFICANT CHANGE UP
GLUCOSE SERPL-MCNC: 131 MG/DL — HIGH (ref 70–99)
GLUCOSE UR QL: NEGATIVE — SIGNIFICANT CHANGE UP
HCT VFR BLD CALC: 43 % — SIGNIFICANT CHANGE UP (ref 34.5–45)
HGB BLD-MCNC: 14.6 G/DL — SIGNIFICANT CHANGE UP (ref 11.5–15.5)
HYALINE CASTS # UR AUTO: 0 /LPF — SIGNIFICANT CHANGE UP (ref 0–2)
IMM GRANULOCYTES NFR BLD AUTO: 0.2 % — SIGNIFICANT CHANGE UP (ref 0–0.9)
INR BLD: 1.06 RATIO — SIGNIFICANT CHANGE UP (ref 0.88–1.16)
KETONES UR-MCNC: NEGATIVE — SIGNIFICANT CHANGE UP
LEUKOCYTE ESTERASE UR-ACNC: NEGATIVE — SIGNIFICANT CHANGE UP
LYMPHOCYTES # BLD AUTO: 1.94 K/UL — SIGNIFICANT CHANGE UP (ref 1–3.3)
LYMPHOCYTES # BLD AUTO: 30.7 % — SIGNIFICANT CHANGE UP (ref 13–44)
MCHC RBC-ENTMCNC: 30 PG — SIGNIFICANT CHANGE UP (ref 27–34)
MCHC RBC-ENTMCNC: 34 GM/DL — SIGNIFICANT CHANGE UP (ref 32–36)
MCV RBC AUTO: 88.3 FL — SIGNIFICANT CHANGE UP (ref 80–100)
MONOCYTES # BLD AUTO: 0.42 K/UL — SIGNIFICANT CHANGE UP (ref 0–0.9)
MONOCYTES NFR BLD AUTO: 6.7 % — SIGNIFICANT CHANGE UP (ref 2–14)
NEUTROPHILS # BLD AUTO: 3.84 K/UL — SIGNIFICANT CHANGE UP (ref 1.8–7.4)
NEUTROPHILS NFR BLD AUTO: 60.8 % — SIGNIFICANT CHANGE UP (ref 43–77)
NITRITE UR-MCNC: NEGATIVE — SIGNIFICANT CHANGE UP
NRBC # BLD: 0 /100 WBCS — SIGNIFICANT CHANGE UP (ref 0–0)
PH UR: 8 — SIGNIFICANT CHANGE UP (ref 5–8)
PLATELET # BLD AUTO: 167 K/UL — SIGNIFICANT CHANGE UP (ref 150–400)
POTASSIUM SERPL-MCNC: 3.8 MMOL/L — SIGNIFICANT CHANGE UP (ref 3.5–5.3)
POTASSIUM SERPL-SCNC: 3.8 MMOL/L — SIGNIFICANT CHANGE UP (ref 3.5–5.3)
PROT SERPL-MCNC: 6.9 G/DL — SIGNIFICANT CHANGE UP (ref 6–8.3)
PROT UR-MCNC: NEGATIVE — SIGNIFICANT CHANGE UP
PROTHROM AB SERPL-ACNC: 12.3 SEC — SIGNIFICANT CHANGE UP (ref 10.5–13.4)
RBC # BLD: 4.87 M/UL — SIGNIFICANT CHANGE UP (ref 3.8–5.2)
RBC # FLD: 13.6 % — SIGNIFICANT CHANGE UP (ref 10.3–14.5)
RBC CASTS # UR COMP ASSIST: 0 /HPF — SIGNIFICANT CHANGE UP (ref 0–4)
RSV RNA NPH QL NAA+NON-PROBE: SIGNIFICANT CHANGE UP
SARS-COV-2 RNA SPEC QL NAA+PROBE: SIGNIFICANT CHANGE UP
SODIUM SERPL-SCNC: 138 MMOL/L — SIGNIFICANT CHANGE UP (ref 135–145)
SP GR SPEC: 1.01 — SIGNIFICANT CHANGE UP (ref 1.01–1.02)
TROPONIN T, HIGH SENSITIVITY RESULT: 8 NG/L — SIGNIFICANT CHANGE UP (ref 0–51)
UROBILINOGEN FLD QL: NEGATIVE — SIGNIFICANT CHANGE UP
WBC # BLD: 6.31 K/UL — SIGNIFICANT CHANGE UP (ref 3.8–10.5)
WBC # FLD AUTO: 6.31 K/UL — SIGNIFICANT CHANGE UP (ref 3.8–10.5)
WBC UR QL: 2 /HPF — SIGNIFICANT CHANGE UP (ref 0–5)

## 2022-11-27 PROCEDURE — 71045 X-RAY EXAM CHEST 1 VIEW: CPT | Mod: 26

## 2022-11-27 PROCEDURE — 70498 CT ANGIOGRAPHY NECK: CPT | Mod: 26,MA

## 2022-11-27 PROCEDURE — 70496 CT ANGIOGRAPHY HEAD: CPT | Mod: 26,MA

## 2022-11-27 PROCEDURE — 99221 1ST HOSP IP/OBS SF/LOW 40: CPT | Mod: GC

## 2022-11-27 PROCEDURE — 93010 ELECTROCARDIOGRAM REPORT: CPT

## 2022-11-27 PROCEDURE — 99291 CRITICAL CARE FIRST HOUR: CPT

## 2022-11-27 RX ORDER — CELECOXIB 200 MG/1
1 CAPSULE ORAL
Qty: 0 | Refills: 0 | DISCHARGE

## 2022-11-27 RX ORDER — LANOLIN ALCOHOL/MO/W.PET/CERES
3 CREAM (GRAM) TOPICAL AT BEDTIME
Refills: 0 | Status: DISCONTINUED | OUTPATIENT
Start: 2022-11-27 | End: 2022-11-28

## 2022-11-27 RX ORDER — MULTIVIT-MIN/FERROUS GLUCONATE 9 MG/15 ML
1 LIQUID (ML) ORAL
Qty: 0 | Refills: 0 | DISCHARGE

## 2022-11-27 RX ORDER — ASCORBIC ACID 60 MG
1 TABLET,CHEWABLE ORAL
Qty: 0 | Refills: 0 | DISCHARGE

## 2022-11-27 RX ORDER — BRIVARACETAM 25 MG/1
100 TABLET, FILM COATED ORAL ONCE
Refills: 0 | Status: DISCONTINUED | OUTPATIENT
Start: 2022-11-27 | End: 2022-11-29

## 2022-11-27 RX ORDER — ENOXAPARIN SODIUM 100 MG/ML
40 INJECTION SUBCUTANEOUS EVERY 24 HOURS
Refills: 0 | Status: DISCONTINUED | OUTPATIENT
Start: 2022-11-27 | End: 2022-11-29

## 2022-11-27 RX ORDER — CYCLOSPORINE 0.5 MG/ML
1 EMULSION OPHTHALMIC
Qty: 0 | Refills: 0 | DISCHARGE

## 2022-11-27 RX ORDER — FLUTICASONE PROPIONATE AND SALMETEROL 50; 250 UG/1; UG/1
1 POWDER ORAL; RESPIRATORY (INHALATION)
Qty: 0 | Refills: 0 | DISCHARGE

## 2022-11-27 NOTE — STROKE CODE NOTE - NIH STROKE SCALE: 1A. LEVEL OF CONSCIOUSNESS, QM
(0) Alert; keenly responsive
(1) other risk factor (includes escalating BMI, pack-years of smoking, diabetes requiring insulin, chemotherapy, female gender and length of surgery)

## 2022-11-27 NOTE — ED ADULT NURSE NOTE - OBJECTIVE STATEMENT
88 yo female PMH colitis, A&Ox3, BIBEMS for disorientation r/o stroke.  CODE Stroke 1949.  Family reports pt had a 20 min of confusion, became lucid and then another episode of 7 min of confusion prior to ED arrival.  Breathing even and unlabored, able to speak in clear sentences, no facial droop, equal strength, motor, sensory in all exts, abdomen soft nonteder, pedal edema +2.  Pt denies chest pain, palpitations, shortness of breath, headache, visual disturbances, numbness/tingling, fever, chills, diaphoresis,  nausea, vomiting, constipation, diarrhea, or urinary symptoms. 88 yo female PMH colitis, A&Ox3, farsi speaking son at bedside to translate BIBEMS for disorientation r/o stroke.  CODE Stroke 1949.  Family reports pt had a 20 min of confusion, became lucid and then another episode of 7 min of confusion prior to ED arrival.  Breathing even and unlabored, able to speak in clear sentences, no facial droop, equal strength, motor, sensory in all exts, abdomen soft nonteder, pedal edema +2.  Pt denies chest pain, palpitations, shortness of breath, headache, visual disturbances, numbness/tingling, fever, chills, diaphoresis,  nausea, vomiting, constipation, diarrhea, or urinary symptoms.

## 2022-11-27 NOTE — H&P ADULT - ASSESSMENT
ASSESSMENT     IMPRESSION     PLAN  ASSESSMENT   87 right handed female with history of colitis and TIA (MRI 09/2022 negative) presented to the hospital with 20 minutes and 7 minutes of confusion. She was talking to her son when she had an episode where she was asking her son to repeat what he was saying and repeating her words "what are you talking about?" Son noted some associated blank staring episodes.  This episode occurred for twenty minutes including the time that the EMS came. During the episode, she was not able to identify person nor place. She had another episode on the EMS ride on the way the hospital which improved by the time she got to the hospital. CODE STROKE called 1949. NIHSS 1. mRS 3. The patient received CT and CTA which demonstrated no acute hemorrhage nor mass effect. 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 Upon speaking with the patient's family, it was revealed that the patient has had multiple episodes of intermittent staring spells usually lasing one to two minutes. However, this current episode was much longer than before, prompting the hospital transfer.  Not candidate for tPA given no focal symptoms and diagnosis less likely stroke but more likely seizure. Not candidate for thrombectomy because no LVO.     Of note, the patient had a previous episode of one to two minutes staring episodes where she presented to outpatient Neurologist afterwards, who recommended MRI for TIA workup which was negative.      IMPRESSION     PLAN  ASSESSMENT   87 right handed female with history of colitis and TIA (MRI 09/2022 negative) presented to the hospital with 20 minutes and 7 minutes of confusion. Had one previous episode in 09/2022 for which she received an MRI which was negative. In the intern, the patient has had several episodes of staring spells lasting one to two minutes but she did not go to the hospital for these episodes. Given that this episode was longer than previous, the decision was made to send her to the hospital.  CODE STROKE called 1949. NIHSS 1. mRS 3. The patient received CT and CTA which demonstrated no acute hemorrhage nor mass effect. 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 Not candidate for tPA given no focal symptoms and diagnosis less likely stroke but more likely seizure. Not candidate for thrombectomy because no LVO.     IMPRESSION     Multiple episodes of staring spells, more likely focal seizure than stroke given largely negative imaging     PLAN   #NEURO   Seizure   - CBC, CMP, Mg, P, CpK, UA, Utox, ammonia  - MRI head epilepsy protocol   - Lorazepam 1mg IV PRN for seizure activity lasting >3-5mins, >2x/hr, >3x/day, or if GTC , repeat after 1 minute (max dose 0.1 mg/kg)    - Briviact 100mg IV PRN for seizure activity lasting >3 min and refractory to Lorazepam   - video 24hr EEG    - neuro checks, NPO until swallow evaluation, seizure, fall & aspiration precautions   - Please note: if patient has a convulsion, please document length of episode, specifically what patient was doing paying attention to eye opening vs closure, gaze deviation, shaking of extremities, tongue bite, urinary incontinence, any derangement of vital signs. Generalized motor seizures can have dilated and unreactive pupils, absent oculocephalic reflexes (no dolls eyes), and open eyelids (99% of cases). Head turning from sided to side, pelvic thrusting, bicycling movements, hand waving are NOT manifestations of generalized motor seizures.    Fusiform aneurysm  - nsx consult     Insomnia   Takes Zolpidem at home   Start on melatonin but can consider escalating if required     #CARDIAC  HTN   SBP >170   normotensive, consider cardizem if persistently  elevated     #RESPIRATORY   on RA    #FENGI   ctm  Pending s/s   Start regular diet when passes dysphagia screen      #RENAL   ctm     #HEME ONC  lovenox 40      #ETHICS    PT/OT    To be discussed with neurology attending and seen on morning rounds. Recommendations finalized once signed by attending.

## 2022-11-27 NOTE — H&P ADULT - NSHPPHYSICALEXAM_GEN_ALL_CORE
Physical Examination:   General - NAD, pleasant, cooperative but slightly lethargic   Cardiovascular - Peripheral pulses palpable, no edema  Neurologic Exam:  Mental status - Awake, Alert, Oriented to person, place, and time. Speech fluent, repetition and naming intact. Follows simple and complex commands. Attention/concentration, recent and remote memory (including registration and recall), and fund of knowledge intact    Cranial nerves:  CN II: Pupils are 4 mm and briskly reactive to light. BTT  CN III, IV, VI: EOMI, no nystagmus, no ptosis  CN V: Facial sensation is intact to pinprick in all 3 divisions bilaterally.  CN VII: Face is symmetric with normal eye closure and smile.  CN VII: Hearing is normal to rubbing fingers  CN IX, X: Palate elevates symmetrically. Phonation is normal.  CN XI: Head turning and shoulder shrug are intact  CN XII: Tongue is midline with normal movements and no atrophy.    Motor - Normal bulk and tone throughout. No pronator drift of out-stretched arms.  Strength testing            Deltoid      Biceps      Triceps     Wrist Extension    Wrist Flexion     Interossei         R            5                 5               4+                     5                              4+                        -                 5  L             5                 5               4+                     5                              4+                        -                 5              Hip Flexion    Hip Extension    Knee Flexion    Knee Extension    Dorsiflexion    Plantar Flexion  R              5                           5                       5                           5                            5                          5  L              5                           5                        5                           5                            5                          5    Sensation - Light touch intact in fingers and toes     DTR's -             Biceps      Triceps     Brachioradialis      Patellar    Ankle    Toes/plantar response  R             2+             1+                  2+                       2+            1+                 Down  L              2+             1+                 2+                        2+           1+                 Down    Coordination - There is no dysmetria on finger-to-nose and heel-knee-shin. There are no abnormal or extraneous movements.     Gait and station - Ataxic gait

## 2022-11-27 NOTE — ED PROVIDER NOTE - PHYSICAL EXAMINATION
Gen: WDWN, NAD  HEENT: EOMI, no nasal discharge, mucous membranes moist  CV: RRR, 2+ radial pulses L radial  Resp: no accessory muscle use, no increased work of breathing  GI: Abdomen soft non-distended, NTTP  MSK: No open wounds, no bruising, no LE edema  Neuro: A&Ox4, following commands, moving all four extremities spontaneously. CN3-12 intact, EOMI. PERRLA, 5/5 strength in b/l UE/LE.  Sensation intact bl in UE/LE.   Psych: appropriate mood

## 2022-11-27 NOTE — ED PROVIDER NOTE - ATTENDING CONTRIBUTION TO CARE
Elvis Villar MD:   I personally saw the patient and performed a substantive portion of the visit including all aspects of the medical decision making.    MDM: 87-year-old female who was brought in by EMS for disorientation, abnormal speech and elevated blood pressure.  Last known normal was 8 PM, while patient was sitting at home, the son reports that the patient was speaking but not making sense with the content of her speech.  There were 2 episodes in total, the first episode lasting 20 minutes, the last episode was witnessed by EMS approximately 7 to 10 minutes.  EMS also reported possible left facial droop. patient with history of TIA, was supposed to be on aspirin, however has been noncompliant.  No recent falls or injuries.   On examination, patient is ANO x3, no focal neurodeficits, normal speech in Farsi, corroborated with the son at bedside.  Cardiac RRR, lungs clear to auscultation, abdomen soft nontender.  Normal perfusion in all 4 extremities.  Stroke code called, patient seen immediately by ED and Neurology/Stroke team. Patient with evidence of CVA. Will assess for ischemic vs hemorrhagic with stat CT scan. Will also evaluate with CTA for LVO and cerebral/vertebral artery dissection. TPA indications and contraindications, risks and benefits discuss with patient and Stroke team.  However as patient has resolved symptoms, plan to withhold tPA and thrombectomy at this time. Elvis Villar MD:   I personally saw the patient and performed a substantive portion of the visit including all aspects of the medical decision making.    MDM: 87-year-old female who was brought in by EMS for disorientation, abnormal speech and elevated blood pressure.  Last known normal was 8 PM, while patient was sitting at home, the son reports that the patient was speaking but not making sense with the content of her speech.  There were 2 episodes in total, the first episode lasting 20 minutes, the last episode was witnessed by EMS approximately 7 to 10 minutes.  EMS also reported possible left facial droop. patient with history of TIA, was supposed to be on aspirin, however has been noncompliant.  No recent falls or injuries.   On examination, patient is ANO x3, no focal neurodeficits, normal speech in Farsi, corroborated with the son at bedside.  Cardiac RRR, lungs clear to auscultation, abdomen soft nontender.  Normal perfusion in all 4 extremities.  Stroke code called, patient seen immediately by ED and Neurology/Stroke team. Patient with evidence of CVA. Will assess for ischemic vs hemorrhagic with stat CT scan. Will also evaluate with CTA for LVO and cerebral/vertebral artery dissection. TPA indications and contraindications, risks and benefits discuss with patient and Stroke team.  However as patient has resolved symptoms, plan to withhold tPA and thrombectomy at this time.    Imaging and labs reviewed, nonactionable.   The patient will need to be admitted to the hospital for continued evaluation and management, as well as to optimize medical management and to provide outpatient needs assessment.  Discussed with the accepting physician regarding the initial presentation, diagnostic studies, treatments given in the ED, and current plan of care.   The patient was accepted by and endorsed to the Neuro team to EMU.

## 2022-11-27 NOTE — ED PROVIDER NOTE - INTERPRETATION
Sinus rhythm at 75 bpm, normal axis, no ST elevations or depressions.  Isolated T wave inversion in lead III.  QTc 462

## 2022-11-27 NOTE — H&P ADULT - ATTENDING COMMENTS
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

## 2022-11-27 NOTE — H&P ADULT - NSHPLABSRESULTS_GEN_ALL_CORE
LABS: Personally reviewed labs, imaging, and ECG                          14.6   6.31  )-----------( 167      ( 2022 19:53 )             43.0           138  |  102  |  13  ----------------------------<  131<H>  3.8   |  25  |  0.88    Ca    9.7      2022 19:53    TPro  6.9  /  Alb  3.9  /  TBili  0.5  /  DBili  x   /  AST  12  /  ALT  7<L>  /  AlkPhos  90         LIVER FUNCTIONS - ( 2022 19:53 )  Alb: 3.9 g/dL / Pro: 6.9 g/dL / ALK PHOS: 90 U/L / ALT: 7 U/L / AST: 12 U/L / GGT: x                    Urinalysis Basic - ( 2022 20:32 )    Color: Colorless / Appearance: Clear / S.011 / pH: x  Gluc: x / Ketone: Negative  / Bili: Negative / Urobili: Negative   Blood: x / Protein: Negative / Nitrite: Negative   Leuk Esterase: Negative / RBC: 0 /hpf / WBC 2 /HPF   Sq Epi: x / Non Sq Epi: 1 /hpf / Bacteria: Negative        PT/INR - ( 2022 19:53 )   PT: 12.3 sec;   INR: 1.06 ratio         PTT - ( 2022 19:53 )  PTT:28.1 sec    Lactate Trend            CAPILLARY BLOOD GLUCOSE      POCT Blood Glucose.: 117 mg/dL (2022 19:48)            RADIOLOGY & ADDITIONAL TESTS:     < from: Xray Chest 1 View- PORTABLE-Urgent (Xray Chest 1 View- PORTABLE-Urgent .) (22 @ 21:27) >    No focal consolidation.    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 Questionable tiny 1 mm laterally projecting   aneurysm off of the clinoid segment left ICA. No major vessel occlusion,   hemodynamically significant stenosis, or dissection.    Prominent mediastinal adenopathy partially visualized. Consider follow-up   nonemergent chest CT for full evaluation of the mediastinum.    < end of copied text >    < from: CT Brain Stroke Protocol (11.27.22 @ 20:10) >    IMPRESSION:  No acute intracranial hemorrhage, vasogenic edema, extra-axial collection   or hydrocephalus. Small chronic lacunar infarcts in the corona radiata   and thalami and mild microvascular changes.    < end of copied text >

## 2022-11-27 NOTE — STROKE CODE NOTE - IV ALTEPLASE EXCLUSION ABS OTHER
Symptoms of staring episodes with post ictal state more likely of altered mental status secondary to seizure

## 2022-11-27 NOTE — ED PROVIDER NOTE - CLINICAL SUMMARY MEDICAL DECISION MAKING FREE TEXT BOX
Lynda Snider MD, PGY-3: 86YO F hx HTN p/w multiple episodes of disorientation, resolved and back to baseline. vss, pe no neuro deficits. suspect stroke/tia. also consider acute infection. plan for code stroke, neuro cs, admission for mri.

## 2022-11-28 LAB
ALBUMIN SERPL ELPH-MCNC: 3.6 G/DL — SIGNIFICANT CHANGE UP (ref 3.3–5)
ALP SERPL-CCNC: 76 U/L — SIGNIFICANT CHANGE UP (ref 40–120)
ALT FLD-CCNC: 7 U/L — LOW (ref 10–45)
AMMONIA BLD-MCNC: 23 UMOL/L — SIGNIFICANT CHANGE UP (ref 11–55)
ANION GAP SERPL CALC-SCNC: 11 MMOL/L — SIGNIFICANT CHANGE UP (ref 5–17)
AST SERPL-CCNC: 9 U/L — LOW (ref 10–40)
BILIRUB SERPL-MCNC: 1 MG/DL — SIGNIFICANT CHANGE UP (ref 0.2–1.2)
BUN SERPL-MCNC: 11 MG/DL — SIGNIFICANT CHANGE UP (ref 7–23)
CALCIUM SERPL-MCNC: 9.5 MG/DL — SIGNIFICANT CHANGE UP (ref 8.4–10.5)
CHLORIDE SERPL-SCNC: 104 MMOL/L — SIGNIFICANT CHANGE UP (ref 96–108)
CO2 SERPL-SCNC: 23 MMOL/L — SIGNIFICANT CHANGE UP (ref 22–31)
CREAT SERPL-MCNC: 0.74 MG/DL — SIGNIFICANT CHANGE UP (ref 0.5–1.3)
EGFR: 78 ML/MIN/1.73M2 — SIGNIFICANT CHANGE UP
GLUCOSE SERPL-MCNC: 106 MG/DL — HIGH (ref 70–99)
HCT VFR BLD CALC: 42.4 % — SIGNIFICANT CHANGE UP (ref 34.5–45)
HGB BLD-MCNC: 14.2 G/DL — SIGNIFICANT CHANGE UP (ref 11.5–15.5)
MAGNESIUM SERPL-MCNC: 2.2 MG/DL — SIGNIFICANT CHANGE UP (ref 1.6–2.6)
MCHC RBC-ENTMCNC: 29.2 PG — SIGNIFICANT CHANGE UP (ref 27–34)
MCHC RBC-ENTMCNC: 33.5 GM/DL — SIGNIFICANT CHANGE UP (ref 32–36)
MCV RBC AUTO: 87.2 FL — SIGNIFICANT CHANGE UP (ref 80–100)
NRBC # BLD: 0 /100 WBCS — SIGNIFICANT CHANGE UP (ref 0–0)
PHOSPHATE SERPL-MCNC: 3.7 MG/DL — SIGNIFICANT CHANGE UP (ref 2.5–4.5)
PLATELET # BLD AUTO: 167 K/UL — SIGNIFICANT CHANGE UP (ref 150–400)
POTASSIUM SERPL-MCNC: 3.3 MMOL/L — LOW (ref 3.5–5.3)
POTASSIUM SERPL-SCNC: 3.3 MMOL/L — LOW (ref 3.5–5.3)
PROT SERPL-MCNC: 6.6 G/DL — SIGNIFICANT CHANGE UP (ref 6–8.3)
RBC # BLD: 4.86 M/UL — SIGNIFICANT CHANGE UP (ref 3.8–5.2)
RBC # FLD: 13.6 % — SIGNIFICANT CHANGE UP (ref 10.3–14.5)
SODIUM SERPL-SCNC: 138 MMOL/L — SIGNIFICANT CHANGE UP (ref 135–145)
WBC # BLD: 7.15 K/UL — SIGNIFICANT CHANGE UP (ref 3.8–10.5)
WBC # FLD AUTO: 7.15 K/UL — SIGNIFICANT CHANGE UP (ref 3.8–10.5)

## 2022-11-28 PROCEDURE — 95720 EEG PHY/QHP EA INCR W/VEEG: CPT

## 2022-11-28 PROCEDURE — 70553 MRI BRAIN STEM W/O & W/DYE: CPT | Mod: 26

## 2022-11-28 RX ORDER — LANOLIN ALCOHOL/MO/W.PET/CERES
5 CREAM (GRAM) TOPICAL AT BEDTIME
Refills: 0 | Status: DISCONTINUED | OUTPATIENT
Start: 2022-11-28 | End: 2022-11-29

## 2022-11-28 RX ORDER — INFLUENZA VIRUS VACCINE 15; 15; 15; 15 UG/.5ML; UG/.5ML; UG/.5ML; UG/.5ML
0.7 SUSPENSION INTRAMUSCULAR ONCE
Refills: 0 | Status: COMPLETED | OUTPATIENT
Start: 2022-11-28 | End: 2022-11-28

## 2022-11-28 RX ORDER — POTASSIUM CHLORIDE 20 MEQ
20 PACKET (EA) ORAL ONCE
Refills: 0 | Status: COMPLETED | OUTPATIENT
Start: 2022-11-28 | End: 2022-11-28

## 2022-11-28 RX ADMIN — ENOXAPARIN SODIUM 40 MILLIGRAM(S): 100 INJECTION SUBCUTANEOUS at 05:17

## 2022-11-28 RX ADMIN — Medication 20 MILLIEQUIVALENT(S): at 17:31

## 2022-11-28 NOTE — PHYSICAL THERAPY INITIAL EVALUATION ADULT - PLANNED THERAPY INTERVENTIONS, PT EVAL
GOAL: Pt will ascend/descend 3 steps with CGAx1 with U HR and step over step pattern in 4 weeks./balance training/gait training/strengthening/transfer training

## 2022-11-28 NOTE — PHYSICAL THERAPY INITIAL EVALUATION ADULT - GENERAL OBSERVATIONS, REHAB EVAL
Pt rec'd semi-supine in bed in NAD, VSS, +VEEG, +IVL, agreeable to PT. Pt's 2 sons present at bedside.

## 2022-11-28 NOTE — PATIENT PROFILE ADULT - FALL HARM RISK - HARM RISK INTERVENTIONS
Assistance OOB with selected safe patient handling equipment/Communicate Risk of Fall with Harm to all staff/Discuss with provider need for PT consult/Monitor gait and stability/Orthostatic vital signs/Provide patient with walking aids - walker, cane, crutches/Reinforce activity limits and safety measures with patient and family/Tailored Fall Risk Interventions/Visual Cue: Yellow wristband and red socks/Bed in lowest position, wheels locked, appropriate side rails in place/Call bell, personal items and telephone in reach/Instruct patient to call for assistance before getting out of bed or chair/Non-slip footwear when patient is out of bed/[place] to call system/Physically safe environment - no spills, clutter or unnecessary equipment/Purposeful Proactive Rounding/Room/bathroom lighting operational, light cord in reach

## 2022-11-28 NOTE — CHART NOTE - NSCHARTNOTEFT_GEN_A_CORE
Transport chair    Due to patients deconditioning and generalized weakness, secondary to seizures. Patient will require a transport chair. Patient is unable to self propel in a standard wheelchair. This is necessary to achieve daily tasks and therapies which cannot be achieved with the use of a cane or rolling walker. Patient and family are in agreement with transport chair use at home and assistance will be provided if needed.

## 2022-11-28 NOTE — PATIENT PROFILE ADULT - STATED REASON FOR ADMISSION
Pts son states that she had a period where family was asking her questions and she was speaking illogically for about 20 min.

## 2022-11-28 NOTE — PHYSICAL THERAPY INITIAL EVALUATION ADULT - ADDITIONAL COMMENTS
Pt lives with daughter in a PH +3STE, all needs met on main level. Pt was amb household distances independently with a R/W and (I) with all ADLs PTA except dressing.

## 2022-11-28 NOTE — PHYSICAL THERAPY INITIAL EVALUATION ADULT - NSPTDMEREC_GEN_A_CORE
If pt/family is discharged home, recommend assist with all functional mobility with home PT. DME: R/W for transfers, transport chair for long distance amb, commode, +assist into home If pt/family is discharged home, recommend assist with all functional mobility with home PT. DME: R/W for transfers, +assist into home

## 2022-11-28 NOTE — PHYSICAL THERAPY INITIAL EVALUATION ADULT - PERTINENT HX OF CURRENT PROBLEM, REHAB EVAL
Pt is a 86 y/o R-handed F with PMH: colitis and TIA (MRI 09/2022 negative) p/w 20 minutes and 7 minutes of confusion. She was talking to her son when she had an episode where she was asking her son to repeat what he was saying and repeating her words "what are you talking about?" Pt's son +noted associated blank staring episodes. CODE STROKE called 1949. NIHSS 1. mRS 3. CT and CTA no acute hemorrhage nor mass effect. 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  Not candidate for tPA given no focal symptoms and diagnosis less likely stroke but more likely seizure. Not candidate for thrombectomy because no LVO. Pt is a/w multiple staring spells. Hospital Course: XRAY CHEST (11/27): (-) CTA HEAD/NECK (11/27): Fusiform aneurysm at the L MCA bifurcation measuring 5 x 6 x 9 mm. ?tiny 1 mm laterally projecting aneurysm off of the clinoid segment L ICA. No major vessel occlusion, hemodynamically significant stenosis, or dissection. MRI HEAD (11/27): Small basal ganglia and thalamic remote infarctions. Ischemic white matter disease and atrophy lower range typical for age. Scattered remote petechial hemorrhages.

## 2022-11-29 ENCOUNTER — TRANSCRIPTION ENCOUNTER (OUTPATIENT)
Age: 87
End: 2022-11-29

## 2022-11-29 VITALS
HEART RATE: 81 BPM | DIASTOLIC BLOOD PRESSURE: 73 MMHG | TEMPERATURE: 98 F | SYSTOLIC BLOOD PRESSURE: 115 MMHG | RESPIRATION RATE: 18 BRPM | OXYGEN SATURATION: 97 %

## 2022-11-29 LAB
ALBUMIN SERPL ELPH-MCNC: 3.7 G/DL — SIGNIFICANT CHANGE UP (ref 3.3–5)
ALP SERPL-CCNC: 82 U/L — SIGNIFICANT CHANGE UP (ref 40–120)
ALT FLD-CCNC: 6 U/L — LOW (ref 10–45)
ANION GAP SERPL CALC-SCNC: 12 MMOL/L — SIGNIFICANT CHANGE UP (ref 5–17)
AST SERPL-CCNC: 12 U/L — SIGNIFICANT CHANGE UP (ref 10–40)
BILIRUB SERPL-MCNC: 0.8 MG/DL — SIGNIFICANT CHANGE UP (ref 0.2–1.2)
BUN SERPL-MCNC: 18 MG/DL — SIGNIFICANT CHANGE UP (ref 7–23)
CALCIUM SERPL-MCNC: 9.5 MG/DL — SIGNIFICANT CHANGE UP (ref 8.4–10.5)
CHLORIDE SERPL-SCNC: 102 MMOL/L — SIGNIFICANT CHANGE UP (ref 96–108)
CO2 SERPL-SCNC: 26 MMOL/L — SIGNIFICANT CHANGE UP (ref 22–31)
CREAT SERPL-MCNC: 0.91 MG/DL — SIGNIFICANT CHANGE UP (ref 0.5–1.3)
CULTURE RESULTS: SIGNIFICANT CHANGE UP
EGFR: 61 ML/MIN/1.73M2 — SIGNIFICANT CHANGE UP
GLUCOSE SERPL-MCNC: 191 MG/DL — HIGH (ref 70–99)
MAGNESIUM SERPL-MCNC: 2.1 MG/DL — SIGNIFICANT CHANGE UP (ref 1.6–2.6)
PHOSPHATE SERPL-MCNC: 3.4 MG/DL — SIGNIFICANT CHANGE UP (ref 2.5–4.5)
POTASSIUM SERPL-MCNC: 3.3 MMOL/L — LOW (ref 3.5–5.3)
POTASSIUM SERPL-SCNC: 3.3 MMOL/L — LOW (ref 3.5–5.3)
PROT SERPL-MCNC: 6.6 G/DL — SIGNIFICANT CHANGE UP (ref 6–8.3)
SODIUM SERPL-SCNC: 140 MMOL/L — SIGNIFICANT CHANGE UP (ref 135–145)
SPECIMEN SOURCE: SIGNIFICANT CHANGE UP

## 2022-11-29 PROCEDURE — 97530 THERAPEUTIC ACTIVITIES: CPT

## 2022-11-29 PROCEDURE — 80053 COMPREHEN METABOLIC PANEL: CPT

## 2022-11-29 PROCEDURE — 85025 COMPLETE CBC W/AUTO DIFF WBC: CPT

## 2022-11-29 PROCEDURE — 82140 ASSAY OF AMMONIA: CPT

## 2022-11-29 PROCEDURE — 97116 GAIT TRAINING THERAPY: CPT

## 2022-11-29 PROCEDURE — 83735 ASSAY OF MAGNESIUM: CPT

## 2022-11-29 PROCEDURE — 84100 ASSAY OF PHOSPHORUS: CPT

## 2022-11-29 PROCEDURE — 87637 SARSCOV2&INF A&B&RSV AMP PRB: CPT

## 2022-11-29 PROCEDURE — 95716 VEEG EA 12-26HR CONT MNTR: CPT

## 2022-11-29 PROCEDURE — 99291 CRITICAL CARE FIRST HOUR: CPT | Mod: 25

## 2022-11-29 PROCEDURE — 70553 MRI BRAIN STEM W/O & W/DYE: CPT

## 2022-11-29 PROCEDURE — 70498 CT ANGIOGRAPHY NECK: CPT | Mod: MA

## 2022-11-29 PROCEDURE — 82962 GLUCOSE BLOOD TEST: CPT

## 2022-11-29 PROCEDURE — 70450 CT HEAD/BRAIN W/O DYE: CPT | Mod: MA

## 2022-11-29 PROCEDURE — 70496 CT ANGIOGRAPHY HEAD: CPT | Mod: MA

## 2022-11-29 PROCEDURE — 85610 PROTHROMBIN TIME: CPT

## 2022-11-29 PROCEDURE — 87086 URINE CULTURE/COLONY COUNT: CPT

## 2022-11-29 PROCEDURE — 95700 EEG CONT REC W/VID EEG TECH: CPT

## 2022-11-29 PROCEDURE — 84484 ASSAY OF TROPONIN QUANT: CPT

## 2022-11-29 PROCEDURE — 90662 IIV NO PRSV INCREASED AG IM: CPT

## 2022-11-29 PROCEDURE — A9585: CPT

## 2022-11-29 PROCEDURE — 71045 X-RAY EXAM CHEST 1 VIEW: CPT

## 2022-11-29 PROCEDURE — 97165 OT EVAL LOW COMPLEX 30 MIN: CPT

## 2022-11-29 PROCEDURE — 85027 COMPLETE CBC AUTOMATED: CPT

## 2022-11-29 PROCEDURE — 97161 PT EVAL LOW COMPLEX 20 MIN: CPT

## 2022-11-29 PROCEDURE — 81001 URINALYSIS AUTO W/SCOPE: CPT

## 2022-11-29 PROCEDURE — 85730 THROMBOPLASTIN TIME PARTIAL: CPT

## 2022-11-29 RX ORDER — LEVETIRACETAM 250 MG/1
1 TABLET, FILM COATED ORAL
Qty: 60 | Refills: 0
Start: 2022-11-29 | End: 2022-12-28

## 2022-11-29 RX ORDER — POTASSIUM CHLORIDE 20 MEQ
20 PACKET (EA) ORAL ONCE
Refills: 0 | Status: DISCONTINUED | OUTPATIENT
Start: 2022-11-29 | End: 2022-11-29

## 2022-11-29 RX ORDER — ASPIRIN/CALCIUM CARB/MAGNESIUM 324 MG
1 TABLET ORAL
Qty: 30 | Refills: 0
Start: 2022-11-29 | End: 2022-12-28

## 2022-11-29 RX ADMIN — Medication 5 MILLIGRAM(S): at 02:52

## 2022-11-29 RX ADMIN — INFLUENZA VIRUS VACCINE 0.7 MILLILITER(S): 15; 15; 15; 15 SUSPENSION INTRAMUSCULAR at 14:27

## 2022-11-29 RX ADMIN — ENOXAPARIN SODIUM 40 MILLIGRAM(S): 100 INJECTION SUBCUTANEOUS at 05:49

## 2022-11-29 NOTE — CONSULT NOTE ADULT - CONSULT REQUESTED DATE/TIME
28-Nov-2022 04:40
29-Nov-2022 12:44
27-Nov-2022 20:21
Calcipotriene Pregnancy And Lactation Text: This medication has not been proven safe during pregnancy. It is unknown if this medication is excreted in breast milk.

## 2022-11-29 NOTE — OCCUPATIONAL THERAPY INITIAL EVALUATION ADULT - PERTINENT HX OF CURRENT PROBLEM, REHAB EVAL
87 right handed female with history of colitis and TIA (MRI 09/2022 negative) presented to the hospital with 20 minutes and 7 minutes of confusion. She was talking to her son when she had an episode where she was asking her son to repeat what he was saying and repeating her words "what are you talking about?" Son noted some associated blank staring episodes.  This episode occurred for twenty minutes including the time that the EMS came. During the episode, she was not able to identify person nor place. She had another episode on the EMS ride on the way the hospital which improved by the time she got to the hospital. CODE STROKE called 1949. NIHSS 1. mRS 3. The patient received CT and CTA which demonstrated no acute hemorrhage nor mass effect. 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 Upon speaking with the patient's family, it was revealed that the patient has had multiple episodes of intermittent staring spells usually lasing one to two minutes. However, this current episode was much longer than before, prompting the hospital transfer.  Not candidate for tPA given no focal symptoms and diagnosis less likely stroke but more likely seizure. Not candidate for thrombectomy because no LVO.     MRI Head: Small basal ganglia and thalamic remote infarctions. Ischemic white matter disease and atrophy lower range typical for age. Scattered remote petechial hemorrhages. Left MCA aneurysm, see separate CT angiography report. Anterior temporal lobe structures intact.  No abnormal enhancement

## 2022-11-29 NOTE — OCCUPATIONAL THERAPY INITIAL EVALUATION ADULT - LIVES WITH, PROFILE
As per son at bedside pt lives with daughter in private home, 3 steps to enter. 1st floor setup, walk in shower with chair. Pt I in ADLs and ambulates with RW prior to admission

## 2022-11-29 NOTE — DISCHARGE NOTE PROVIDER - HOSPITAL COURSE
ROUGH DRAFT    87 right handed female with history of colitis and TIA (MRI 09/2022 negative) presented to the hospital with 20 minutes and 7 minutes of confusion. She was talking to her son when she had an episode where she was asking her son to repeat what he was saying and repeating her words "what are you talking about?" Son noted some associated blank staring episodes.  This episode occurred for twenty minutes including the time that the EMS came. During the episode, she was not able to identify person nor place. She had another episode on the EMS ride on the way the hospital which improved by the time she got to the hospital. CODE STROKE called 1949. NIHSS 1. mRS 3. The patient received CT and CTA which demonstrated no acute hemorrhage nor mass effect. 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 Upon speaking with the patient's family, it was revealed that the patient has had multiple episodes of intermittent staring spells usually lasing one to two minutes. However, this current episode was much longer than before, prompting the hospital transfer.  Not candidate for tPA given no focal symptoms and diagnosis less likely stroke but more likely seizure. Not candidate for thrombectomy because no LVO.     Of note, the patient had a previous episode of one to two minutes staring episodes where she presented to outpatient Neurologist afterwards, who recommended MRI for TIA workup which was negative.      Hospital course:  incomplete      EEG Report 11/29/22:   *** incomplete ***      MR Head w/wo IV Cont (11.28.22 @ 09:11)    IMPRESSION:    1. Small basal ganglia and thalamic remote infarctions  2. Ischemic white matter disease and atrophy lower range typical for age  3. Scattered remote petechial hemorrhages  4. Left MCA aneurysm, see separate CT angiography report  5. Anterior temporal lobe structures intact.  No abnormal enhancement       ROUGH DRAFT    87 right handed female with history of colitis and TIA (MRI 09/2022 negative) presented to the hospital with 20 minutes and 7 minutes of confusion. She was talking to her son when she had an episode where she was asking her son to repeat what he was saying and repeating her words "what are you talking about?" Son noted some associated blank staring episodes.  This episode occurred for twenty minutes including the time that the EMS came. During the episode, she was not able to identify person nor place. She had another episode on the EMS ride on the way the hospital which improved by the time she got to the hospital. CODE STROKE called 1949. NIHSS 1. mRS 3. The patient received CT and CTA which demonstrated no acute hemorrhage nor mass effect. 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 Upon speaking with the patient's family, it was revealed that the patient has had multiple episodes of intermittent staring spells usually lasing one to two minutes. However, this current episode was much longer than before, prompting the hospital transfer.  Not candidate for tPA given no focal symptoms and diagnosis less likely stroke but more likely seizure. Not candidate for thrombectomy because no LVO.      Patient was admitted to EMU service to monitor for seizures. Video EEg showed diffuse beta waved likely due to ambien usage, otherwise no seizures observed.  MRI B showed chronic infarcts and Left MCA aneursym, anterior temporal lobe intact. Patient received Keppra 1000 mg load IV and will start 500 mg twice daily (once in the morning and once at night). Discussed with patient and son at bedside, staring episodes are likely seizures. Patient to start keppra to prevent seizures.     EEG Report 11/29/22:   This is a normal EEG record.   Diffuse beta may be seen in the setting of sedative medication use  No epileptiform pattern or seizures were seen.      MR Head w/wo IV Cont (11.28.22 @ 09:11)    IMPRESSION:    1. Small basal ganglia and thalamic remote infarctions  2. Ischemic white matter disease and atrophy lower range typical for age  3. Scattered remote petechial hemorrhages  4. Left MCA aneurysm, see separate CT angiography report  5. Anterior temporal lobe structures intact.  No abnormal enhancement       ROUGH DRAFT    87 right handed female with history of colitis and TIA (MRI 09/2022 negative) presented to the hospital with 20 minutes and 7 minutes of confusion. She was talking to her son when she had an episode where she was asking her son to repeat what he was saying and repeating her words "what are you talking about?" Son noted some associated blank staring episodes.  This episode occurred for twenty minutes including the time that the EMS came. During the episode, she was not able to identify person nor place. She had another episode on the EMS ride on the way the hospital which improved by the time she got to the hospital. CODE STROKE called 1949. NIHSS 1. mRS 3. The patient received CT and CTA which demonstrated no acute hemorrhage nor mass effect. 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 Upon speaking with the patient's family, it was revealed that the patient has had multiple episodes of intermittent staring spells usually lasing one to two minutes. However, this current episode was much longer than before, prompting the hospital transfer.  Not candidate for tPA given no focal symptoms and diagnosis less likely stroke but more likely seizure. Not candidate for thrombectomy because no LVO.      Patient was admitted to EMU service to monitor for seizures. Video EEg showed diffuse beta waved likely due to ambien usage, otherwise no seizures observed.  MRI B showed chronic infarcts and Left MCA aneursym, anterior temporal lobe intact. Patient received Keppra 1000 mg load IV and will start 250 mg twice daily (once in the morning and once at night). Discussed with patient and son at bedside, staring episodes are likely seizures. Patient to start keppra to prevent seizures. She will need to follow up with Neurologist regarding possibility of increasing keppra as outpatient.    EEG Report 11/29/22:   This is a normal EEG record.   Diffuse beta may be seen in the setting of sedative medication use  No epileptiform pattern or seizures were seen.      MR Head w/wo IV Cont (11.28.22 @ 09:11)    IMPRESSION:    1. Small basal ganglia and thalamic remote infarctions  2. Ischemic white matter disease and atrophy lower range typical for age  3. Scattered remote petechial hemorrhages  4. Left MCA aneurysm, see separate CT angiography report  5. Anterior temporal lobe structures intact.  No abnormal enhancement

## 2022-11-29 NOTE — DISCHARGE NOTE PROVIDER - NSDCMRMEDTOKEN_GEN_ALL_CORE_FT
Transport chair: Transport chair    Dx- seizures   aspirin 81 mg oral capsule: 1 cap(s) orally once a day   Keppra 250 mg oral tablet: 1 tab(s) orally 2 times a day MDD:500 mg  Transport chair: Transport chair    Dx- seizures   aspirin 81 mg oral capsule: 1 cap(s) orally once a day   Commode: Commode   Keppra 250 mg oral tablet: 1 tab(s) orally 2 times a day MDD:500 mg  Transport chair: Transport chair    Dx- seizures

## 2022-11-29 NOTE — CONSULT NOTE ADULT - SUBJECTIVE AND OBJECTIVE BOX
Patient is a 87y old  Female who presents with a chief complaint of Multiple episodes of staring spells (2022 10:24)      HPI:  87 right handed female with history of colitis and TIA (MRI 2022 negative) presented to the hospital with 20 minutes and 7 minutes of confusion. She was talking to her son when she had an episode where she was asking her son to repeat what he was saying and repeating her words "what are you talking about?" Son noted some associated blank staring episodes.  This episode occurred for twenty minutes including the time that the EMS came. During the episode, she was not able to identify person nor place. She had another episode on the EMS ride on the way the hospital which improved by the time she got to the hospital. CODE STROKE called 1949. NIHSS 1. mRS 3. The patient received CT and CTA which demonstrated no acute hemorrhage nor mass effect. 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 Upon speaking with the patient's family, it was revealed that the patient has had multiple episodes of intermittent staring spells usually lasing one to two minutes. However, this current episode was much longer than before, prompting the hospital transfer.  Not candidate for tPA given no focal symptoms and diagnosis less likely stroke but more likely seizure. Not candidate for thrombectomy because no LVO.     Of note, the patient had a previous episode of one to two minutes staring episodes where she presented to outpatient Neurologist afterwards, who recommended MRI for TIA workup which was negative.   (2022 22:14)      PAST MEDICAL & SURGICAL HISTORY:  Arthritis      Asthma      Malignant neoplasm of unspecified site of right female breast      S/P hysterectomy  40 years ago      H/O right breast biopsy  2019          FAMILY HISTORY:  No pertinent family history in first degree relatives        SOCIAL HISTORY:    Allergies    No Known Allergies    Intolerances        Review of Systems:  no additional ROS   	    SUBJECTIVE / OVERNIGHT EVENTS:  pt seen and examined with son at bedside. son assisted with translation. son states mom is fine. undergoing PT.      Vital Signs Last 24 Hrs  T(C): 36.4 (2022 11:09), Max: 36.9 (2022 00:31)  T(F): 97.6 (2022 11:09), Max: 98.4 (2022 00:31)  HR: 81 (2022 11:09) (72 - 83)  BP: 115/71 (2022 11:09) (111/67 - 129/77)  BP(mean): --  RR: 18 (2022 11:09) (16 - 18)  SpO2: 93% (2022 11:09) (93% - 95%)    Parameters below as of 2022 11:09  Patient On (Oxygen Delivery Method): room air      I&O's Summary      PHYSICAL EXAM:  GENERAL: NAD, Comfortable, EEG lead on  HEAD:  Atraumatic, Normocephalic  EYES: conjunctiva and sclera clear  NECK: Supple, No JVD  CHEST/LUNG: Clear to auscultation bilaterally; No wheeze  HEART: Regular rate and rhythm; No murmur  ABDOMEN: Soft, Nontender, Nondistended; Bowel sounds present  Neuro: AAOx2, no focal deficits  EXTREMITIES:  2+ Peripheral Pulses, No edema  SKIN: No rashes or lesions    LABS:                        14.2   7.15  )-----------( 167      ( 2022 05:23 )             42.4         140  |  102  |  18  ----------------------------<  191<H>  3.3<L>   |  26  |  0.91    Ca    9.5      2022 10:32  Phos  3.4       Mg     2.1         TPro  6.6  /  Alb  3.7  /  TBili  0.8  /  DBili  x   /  AST  12  /  ALT  6<L>  /  AlkPhos  82      PT/INR - ( 2022 19:53 )   PT: 12.3 sec;   INR: 1.06 ratio         PTT - ( 2022 19:53 )  PTT:28.1 sec  CAPILLARY BLOOD GLUCOSE            Urinalysis Basic - ( 2022 20:32 )    Color: Colorless / Appearance: Clear / S.011 / pH: x  Gluc: x / Ketone: Negative  / Bili: Negative / Urobili: Negative   Blood: x / Protein: Negative / Nitrite: Negative   Leuk Esterase: Negative / RBC: 0 /hpf / WBC 2 /HPF   Sq Epi: x / Non Sq Epi: 1 /hpf / Bacteria: Negative        RADIOLOGY & ADDITIONAL TESTS:    Imaging Personally Reviewed:  [x] YES  [ ] NO    Consultant(s) Notes Reviewed:  [x] YES  [ ] NO      MEDICATIONS  (STANDING):  enoxaparin Injectable 40 milliGRAM(s) SubCutaneous every 24 hours  influenza  Vaccine (HIGH DOSE) 0.7 milliLiter(s) IntraMuscular once  melatonin 5 milliGRAM(s) Oral at bedtime    MEDICATIONS  (PRN):  brivaracetam  Injectable 100 milliGRAM(s) IV Push once PRN seizure  LORazepam   Injectable 1 milliGRAM(s) IV Push once PRN seizure      Care Discussed with Consultants/Other Providers [x] YES  [ ] NO    HEALTH ISSUES - PROBLEM Dx:      
p ([id number])     HPI:  87 right handed female with history of colitis and TIA (MRI 2022 negative) presented to the hospital with 20 minutes and 7 minutes of confusion. She was talking to her son when she had an episode where she was asking her son to repeat what he was saying and repeating her words "what are you talking about?" Son noted some associated blank staring episodes.  This episode occurred for twenty minutes including the time that the EMS came. During the episode, she was not able to identify person nor place. She had another episode on the EMS ride on the way the hospital which improved by the time she got to the hospital. CODE STROKE called 1949. NIHSS 1. mRS 3. The patient received CT and CTA which demonstrated no acute hemorrhage nor mass effect. 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 Upon speaking with the patient's family, it was revealed that the patient has had multiple episodes of intermittent staring spells usually lasing one to two minutes. However, this current episode was much longer than before, prompting the hospital transfer.  Not candidate for tPA given no focal symptoms and diagnosis less likely stroke but more likely seizure. Not candidate for thrombectomy because no LVO.     Of note, the patient had a previous episode of one to two minutes staring episodes where she presented to outpatient Neurologist afterwards, who recommended MRI for TIA workup which was negative.   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 No hemorrhage on CTH    Exam: Intact    --Anticoagulation:  enoxaparin Injectable 40 milliGRAM(s) SubCutaneous every 24 hours    =====================  PAST MEDICAL HISTORY   Arthritis    Asthma    Malignant neoplasm of unspecified site of right female breast      PAST SURGICAL HISTORY   S/P hysterectomy    H/O right breast biopsy          MEDICATIONS:  Antibiotics:    Neuro:  brivaracetam  Injectable 100 milliGRAM(s) IV Push once PRN  LORazepam   Injectable 1 milliGRAM(s) IV Push once PRN  melatonin 5 milliGRAM(s) Oral at bedtime    Other:  influenza  Vaccine (HIGH DOSE) 0.7 milliLiter(s) IntraMuscular once      SOCIAL HISTORY:   Occupation:   Marital Status:     FAMILY HISTORY:  No pertinent family history in first degree relatives        ROS: Negative except per HPI    LABS:  PT/INR - ( 2022 19:53 )   PT: 12.3 sec;   INR: 1.06 ratio         PTT - ( 2022 19:53 )  PTT:28.1 sec                        14.6   6.31  )-----------( 167      ( 2022 19:53 )             43.0         138  |  102  |  13  ----------------------------<  131<H>  3.8   |  25  |  0.88    Ca    9.7      2022 19:53    TPro  6.9  /  Alb  3.9  /  TBili  0.5  /  DBili  x   /  AST  12  /  ALT  7<L>  /  AlkPhos  90        
**STROKE CODE CONSULT NOTE**    Last known well time/Time of onset of symptoms:  11/27/2022 1900    HPI: 87 right handed female with history of colitis and TIA (MRI 09/2022 negative) presented to the hospital with 20 minutes and 7 minutes of confusion. She was talking to her some when she had an episode where she was asking her son to repeat what he was saying and repeating her words "what are you talking about?" This episode occurred for twenty minutes including the time that the EMS came. During the episode, she was not able to identify person nor place. She had another episode on the EMS ride on the way the hospital which improved by the time she got to the hospital. CODE STROKE called 1949.     PAST MEDICAL & SURGICAL HISTORY:  Arthritis      Asthma      Malignant neoplasm of unspecified site of right female breast      S/P hysterectomy  40 years ago      H/O right breast biopsy  12/2019          FAMILY HISTORY:  No pertinent family history in first degree relatives        SOCIAL HISTORY:  Smoking Cesation: Discussed    ROS:  Neurological: As per HPI  MEDICATIONS  (STANDING):    MEDICATIONS  (PRN):      Allergies    No Known Allergies    Intolerances        Vital Signs Last 24 Hrs  T(C): --  T(F): --  HR: 74 (27 Nov 2022 19:45) (74 - 74)  BP: 170/90 (27 Nov 2022 19:45) (170/90 - 170/90)  BP(mean): --  RR: 18 (27 Nov 2022 19:45) (18 - 18)  SpO2: 97% (27 Nov 2022 19:45) (97% - 97%)        PHYSICAL EXAM:  Constitutional: WDWN; NAD  Cardiovascular: RRR, no appreciable murmurs; no carotid bruits  Neurologic:  Mental status: Awake, alert and oriented x3.  Recent and remote memory intact.  Naming, repetition and comprehension intact.  Attention/concentration intact.  No dysarthria, no aphasia.  Fund of knowledge appropriate.    Cranial nerves: Fundoscopic exam demonstrated no abnormalities, pupils equally round and reactive to light, visual fields full, no nystagmus, extraocular muscles intact, V1 through V3 intact bilaterally and symmetric, face symmetric, hearing intact to finger rub, palate elevation symmetric, tongue was midline, sternocleidomastoid/shoulder shrug strength bilaterally 5/5.    Motor:  Normal bulk and tone, strength 5/5 in bilateral upper and lower extremities.   strength 5/5.  Rapid alternating movements intact and symmetric.   Sensation: Intact to light touch, proprioception, and pinprick.  No neglect.   Coordination: No dysmetria on finger-to-nose and heel-to-shin.  No clumsiness.  Reflexes: 2+ in upper and lower extremities, downgoing toes bilaterally  Gait: Narrow and steady. No ataxia.  Romberg negative    NIHSS:    Fingerstick Blood Glucose: CAPILLARY BLOOD GLUCOSE      POCT Blood Glucose.: 117 mg/dL (27 Nov 2022 19:48)       LABS:                        14.6   6.31  )-----------( 167      ( 27 Nov 2022 19:53 )             43.0     11-27    138  |  102  |  13  ----------------------------<  131<H>  3.8   |  25  |  0.88    Ca    9.7      27 Nov 2022 19:53    TPro  6.9  /  Alb  3.9  /  TBili  0.5  /  DBili  x   /  AST  12  /  ALT  7<L>  /  AlkPhos  90  11-27    PT/INR - ( 27 Nov 2022 19:53 )   PT: 12.3 sec;   INR: 1.06 ratio         PTT - ( 27 Nov 2022 19:53 )  PTT:28.1 sec          RADIOLOGY & ADDITIONAL STUDIES:  < from: CT Brain Stroke Protocol (11.27.22 @ 20:10) >  Prominence of the sulci and ventricles, consistent with   age-related parenchymal volume loss. No acute intracranial hemorrhage, vasogenic edema, extra-axial collection   or hydrocephalus. Small chronic lacunar infarcts in the corona radiata   and thalami and mild microvascular changes.    < end of copied text >    IV-tPA (Y/N):                                   Bolus time:  Reason IV-tPA not given:

## 2022-11-29 NOTE — DISCHARGE NOTE PROVIDER - NSDCCPCAREPLAN_GEN_ALL_CORE_FT
PRINCIPAL DISCHARGE DIAGNOSIS  Diagnosis: Focal seizure  Assessment and Plan of Treatment:        PRINCIPAL DISCHARGE DIAGNOSIS  Diagnosis: Focal seizure  Assessment and Plan of Treatment: You were admitted to the epilepsy monitoring unit and monitored on vEEG for seizure activity. Your vEEG is normal, however, based on clinical history there is high suspicision that you do have epilepsy. You are prescribed keppra 250 mg twice a day for seizure management. Please follow up with your neurologist as this dose may be uptitrated. Your MRI of the brain showed remote infarcts so you were prescribed aspirin 81 mg once a day for life long therapy to prevent stroke recurrence. Please take your medications as prescribed. If you experience any seizure or stroke like activity please return immediately to the ED.

## 2022-11-29 NOTE — DISCHARGE NOTE PROVIDER - CARE PROVIDER_API CALL
Bacilio Durand)  Neurosurgery  [address], [address]  [place] [address]  Phone: [phone]  Fax: [phone]  Follow Up Time: Routine    Natalia Degroot)  Neurology; Vascular Neurology  [address] [place] [id number]  Phone: [phone]  Fax: [phone]  Follow Up Time: 2 weeks

## 2022-11-29 NOTE — DISCHARGE NOTE PROVIDER - CARE PROVIDERS DIRECT ADDRESSES
[email or web address]

## 2022-11-29 NOTE — OCCUPATIONAL THERAPY INITIAL EVALUATION ADULT - NSOTDISCHREC_GEN_A_CORE
however family wants home, would require supervision for functional activities and home OT for ADLs and safety assessment in home environment/Sub-acute Rehab

## 2022-11-29 NOTE — CONSULT NOTE ADULT - ASSESSMENT
87F pmhx TIA vs. seizure p/w 20m AMS + blank staring c/f seizure. w/ incidental 5x9mm MCA bifurcation fusiform aneurysm, questionable 1mm L ICA clinoidal aneurysm on CTA. No hemorrhage on CTH. Intact  -Admitted to neuro EMU for seizure control  -No acute neurosurgical intervention   -Outpatient with Dr. Durand 1-2 weeks from discharge for further discussion of incidental aneurysm
ASSESSMENT     IMPRESSION     RECOMMENDATION   [] Aspirin 81mg daily   [] Atorvastatin 40-80 mg daily titrated per LDL < 70  [] HgbA1C, fasting lipid panel, CBC, CMP, coag panel, troponin  [] MRI brain w/o con  [] TTE w/ bubble study  [] telemetry to check for arrhythmia, EKG, will discuss loop recorder    - Tight glucose control (long-term goal HgbA1c < 6%)  - Stroke education and counseling  - Neuro-checks and VS q4h  - Permissive HTN up to 220/120 for 24-48h from symptom onset  - Dysphagia screen. If fails, speech/swallow eval  - aspiration, fall precautions  - STAT CT head non-contrast for change in neuro exam.   - PT/ OT / DVT ppx per primary team     Discussed with stroke fellow Franck Pugh and under supervision of attending Teddy Ruth     regarding decision against candidacy for tPA/ thrombectomy. Will be formally staffed on morning rounds with attending. Recommendations will be complete once signed by attending.
87 right handed female with history of colitis and TIA presented to the hospital with multiple staring spells.  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 medicina consult for co management.    # seizure  # incidental LICA anuerysm  # hx TIA  # insomnia    video 24hr EEG ongoing  neuro checks  PT  appreciate NSGY recs, outpt fu no acute intervention  rest per neuro    will update Dr Goldberg PCP    dw son at bedside    Thank you for this consult. Please call [phone] with questions [phone].

## 2022-11-29 NOTE — DISCHARGE NOTE NURSING/CASE MANAGEMENT/SOCIAL WORK - NSDCPEFALRISK_GEN_ALL_CORE
For information on Fall & Injury Prevention, visit: [email or web address] OR  [email or web address] OR  https://www.cdc.gov/steadi/patient.html

## 2022-11-29 NOTE — EEG REPORT - NS EEG TEXT BOX
Day 1 – 	Start: 11/28/2022  11:47:11 AM     	End: 11/29/2022  08:00  	Duration: 20 hr    Daily EEG Visual Analysis    FINDINGS:  The background was continuous, spontaneously variable and reactive. During wakefulness, the posterior dominant rhythm consisted of symmetric, well-modulated 9 Hz activity, with amplitude to 30 uV, that attenuated to eye opening.  Low amplitude frontal beta was noted in wakefulness.    Background Slowing:  No generalized background slowing was present.    Focal Slowing:   None were present.    Sleep Background:  Drowsiness was characterized by fragmentation, attenuation, and slowing of the background activity.    Sleep was characterized by the presence of vertex waves, symmetric sleep spindles and K-complexes.    Other Non-Epileptiform Findings:  Diffuse excess beta activity, in particular early in the recording    Activation Procedures:   Hyperventilation was not performed.    Photic stimulation was performed and did not elicit any abnormalities.      Interictal Epileptiform Activity:   None were present.    Events:  No events or seizures recorded.    Artifacts:  Intermittent myogenic and movement artifacts were noted.    ECG:  The heart rate on single channel ECG was predominantly between 60-70 BPM.    AEDs:  none    EEG Summary:    Normal EEG in the awake, drowsy and asleep states.    Diffuse beta early in the recording    Impression/Clinical Correlate:    This is a normal EEG record.     Diffuse beta may be seen in the setting of sedative medication use  No epileptiform pattern or seizures were seen.    This is a prelim report only, pending review with attending prior to finalization.     Shaheen Durant MD     Fellow, [facility]     ------------------------------------     EEG Reading Room: [phone]     On Call Service After Hours: [phone]  Day 1 – 	Start: 11/28/2022  11:47:11 AM     	End: 11/29/2022  08:00  	Duration: 20 hr    Daily EEG Visual Analysis    FINDINGS:  The background was continuous, spontaneously variable and reactive. During wakefulness, the posterior dominant rhythm consisted of symmetric, well-modulated 9 Hz activity, with amplitude to 30 uV, that attenuated to eye opening.  Low amplitude frontal beta was noted in wakefulness.    Background Slowing:  No generalized background slowing was present.    Focal Slowing:   None were present.    Sleep Background:  Drowsiness was characterized by fragmentation, attenuation, and slowing of the background activity.    Sleep was characterized by the presence of vertex waves, symmetric sleep spindles and K-complexes.    Other Non-Epileptiform Findings:  Diffuse excess beta activity, in particular early in the recording    Activation Procedures:   Hyperventilation was not performed.    Photic stimulation was performed and did not elicit any abnormalities.      Interictal Epileptiform Activity:   None were present.    Events:  No events or seizures recorded.    Artifacts:  Intermittent myogenic and movement artifacts were noted.    ECG:  The heart rate on single channel ECG was predominantly between 60-70 BPM.    AEDs:  none    EEG Summary:    Normal EEG in the awake, drowsy and asleep states.    Diffuse beta early in the recording    Impression/Clinical Correlate:    This is a normal EEG record.     Diffuse beta may be seen in the setting of sedative medication use  No epileptiform pattern or seizures were seen.      Shaheen Durant MD     Fellow, [facility]     ------------------------------------     EEG Reading Room: [phone]     On Call Service After Hours: [phone]     Aaron Ko MD  Neurology Attending Physician   Day 1 – 	Start: 11/28/2022  11:47:11 AM     	End: 11/29/2022  10:39  	Duration: 23 hr      Daily EEG Visual Analysis    FINDINGS:  The background was continuous, spontaneously variable and reactive. During wakefulness, the posterior dominant rhythm consisted of symmetric, well-modulated 9 Hz activity, with amplitude to 30 uV, that attenuated to eye opening.  Low amplitude frontal beta was noted in wakefulness.    Background Slowing:  No generalized background slowing was present.    Focal Slowing:   None were present.    Sleep Background:  Drowsiness was characterized by fragmentation, attenuation, and slowing of the background activity.    Sleep was characterized by the presence of vertex waves, symmetric sleep spindles and K-complexes.    Other Non-Epileptiform Findings:  Diffuse excess beta activity, in particular early in the recording    Activation Procedures:   Hyperventilation was not performed.    Photic stimulation was performed and did not elicit any abnormalities.      Interictal Epileptiform Activity:   None were present.    Events:  No events or seizures recorded.    Artifacts:  Intermittent myogenic and movement artifacts were noted.    ECG:  The heart rate on single channel ECG was predominantly between 60-70 BPM.    AEDs:  none    EEG Summary:    Normal EEG in the awake, drowsy and asleep states.    Diffuse beta early in the recording    Impression/Clinical Correlate:    This is a normal EEG record.     Diffuse beta may be seen in the setting of sedative medication use  No epileptiform pattern or seizures were seen.      Shaheen Durant MD     Fellow, [facility]     ------------------------------------     EEG Reading Room: [phone]     On Call Service After Hours: [phone]     Aaron Ko MD  Neurology Attending Physician

## 2022-11-29 NOTE — DISCHARGE NOTE NURSING/CASE MANAGEMENT/SOCIAL WORK - PATIENT PORTAL LINK FT
You can access the FollowMyHealth Patient Portal offered by [facility] by registering at the following website: [email or web address]. By joining [email or web address])’s FollowMyHealth portal, you will also be able to view your health information using other applications (apps) compatible with our system.

## 2022-11-29 NOTE — DISCHARGE NOTE PROVIDER - PROVIDER TOKENS
PROVIDER:[TOKEN:[[id number]],FOLLOWUP:[Routine]],PROVIDER:[TOKEN:[[id number]],FOLLOWUP:[2 weeks]]

## 2022-11-29 NOTE — PROGRESS NOTE ADULT - ASSESSMENT
87 right handed female with history of colitis and TIA (MRI 09/2022 negative) admitted for Multiple episodes of staring spells. 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         Plan INCOMPLETE 87 right handed female with history of colitis and TIA (MRI 09/2022 negative) admitted for Multiple episodes of staring spells. 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 Video EEg showed diffuse beta waved likely due to ambien usage, otherwise no seizures observed.  MRI B showed chronic infarcts and Left MCA aneursym, anterior temporal lobe intact. (once in the morning and once at night). Discussed with patient and son at bedside, staring episodes are likely seizures. Patient to start keppra to prevent seizures. She will need to follow up with Neurologist regarding possibility of increasing keppra as outpatient.        Plan   [] MRI brain (results as above)  []Discontinue vEEG (results as above)  [x] Keppra 1000 mg load IV   [] start maintenance Keppra 250 mg BID  [] Patient stable for discharge, to follow up with Neurology as outpatient.     Diet: Regular  DVT ppx: Lovenox    Case seen and discussed with Dr. Alexander.  87 right handed female with history of colitis and TIA (MRI 09/2022 negative) admitted for Multiple episodes of staring spells. 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 Video EEg showed diffuse beta waved likely due to ambien usage, otherwise no seizures observed.  MRI B showed chronic infarcts and Left MCA aneursym, anterior temporal lobe intact. (once in the morning and once at night). Discussed with patient and son at bedside, staring episodes are likely seizures. Patient to start keppra to prevent seizures. She will need to follow up with Neurologist regarding possibility of increasing keppra as outpatient.        Plan   [x] MRI brain (results as above)  [x]Discontinue vEEG (results as above)  [x] Keppra 1000 mg load IV   [] start maintenance Keppra 250 mg BID  [] Patient stable for discharge, to follow up with Neurology as outpatient.     Diet: Regular  DVT ppx: Lovenox    Case seen and discussed with Dr. Alexander.  87 right handed female with history of colitis and TIA (MRI 09/2022 negative) admitted for Multiple episodes of staring spells. 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 Video EEg showed diffuse beta waved likely due to ambien usage, otherwise no seizures observed.  MRI B showed chronic infarcts and Left MCA aneursym, anterior temporal lobe intact. (once in the morning and once at night). Discussed with patient and son at bedside, staring episodes are likely seizures. Patient to start keppra to prevent seizures. She will need to follow up with Neurologist regarding possibility of increasing keppra as outpatient.    Impression: Focal seizures with impaired awareness    Plan   [x] MRI brain (results as above)  [x]Discontinue vEEG (results as above)  [x] Keppra 1000 mg load IV   [] start maintenance Keppra 250 mg BID  [] Patient stable for discharge, to follow up with Neurology as outpatient.     Diet: Regular  DVT ppx: Lovenox    Case seen and discussed with Dr. Alexander.

## 2023-02-22 ENCOUNTER — APPOINTMENT (OUTPATIENT)
Dept: SURGERY | Facility: CLINIC | Age: 88
End: 2023-02-22
Payer: MEDICARE

## 2023-02-22 PROCEDURE — 99213K: CUSTOM

## 2023-08-26 NOTE — PATIENT PROFILE ADULT - LEGAL HELP
You can access the FollowMyHealth Patient Portal offered by [facility] by registering at the following website: [email or web address]. By joining [facility]’s FollowMyHealth portal, you will also be able to view your health information using other applications (apps) compatible with our system.
no

## 2024-02-21 ENCOUNTER — APPOINTMENT (OUTPATIENT)
Dept: SURGERY | Facility: CLINIC | Age: 89
End: 2024-02-21

## 2024-06-24 ENCOUNTER — APPOINTMENT (OUTPATIENT)
Dept: SURGERY | Facility: CLINIC | Age: 89
End: 2024-06-24

## 2024-06-24 PROCEDURE — 99213K: CUSTOM

## 2024-07-24 NOTE — ED ADULT NURSE NOTE - [facility] EDP DISCH DISPOSITION ADMI
Thalidomide Pregnancy And Lactation Text: This medication is Pregnancy Category X and is absolutely contraindicated during pregnancy. It is unknown if it is excreted in breast milk. Minocycline Counseling: Patient advised regarding possible photosensitivity and discoloration of the teeth, skin, lips, tongue and gums.  Patient instructed to avoid sunlight, if possible.  When exposed to sunlight, patients should wear protective clothing, sunglasses, and sunscreen.  The patient was instructed to call the office immediately if the following severe adverse effects occur:  hearing changes, easy bruising/bleeding, severe headache, or vision changes.  The patient verbalized understanding of the proper use and possible adverse effects of minocycline.  All of the patient's questions and concerns were addressed. Birth Control Pills Pregnancy And Lactation Text: This medication should be avoided if pregnant and for the first 30 days post-partum. Cimetidine Counseling:  I discussed with the patient the risks of Cimetidine including but not limited to gynecomastia, headache, diarrhea, nausea, drowsiness, arrhythmias, pancreatitis, skin rashes, psychosis, bone marrow suppression and kidney toxicity. Aklief counseling:  Patient advised to apply a pea-sized amount only at bedtime and wait 30 minutes after washing their face before applying.  If too drying, patient may add a non-comedogenic moisturizer.  The most commonly reported side effects including irritation, redness, scaling, dryness, stinging, burning, itching, and increased risk of sunburn.  The patient verbalized understanding of the proper use and possible adverse effects of retinoids.  All of the patient's questions and concerns were addressed. Cyclosporine Counseling:  I discussed with the patient the risks of cyclosporine including but not limited to hypertension, gingival hyperplasia,myelosuppression, immunosuppression, liver damage, kidney damage, neurotoxicity, lymphoma, and serious infections. The patient understands that monitoring is required including baseline blood pressure, CBC, CMP, lipid panel and uric acid, and then 1-2 times monthly CMP and blood pressure. Ilumya Pregnancy And Lactation Text: The risk during pregnancy and breastfeeding is uncertain with this medication. Solaraze Pregnancy And Lactation Text: This medication is Pregnancy Category B and is considered safe. There is some data to suggest avoiding during the third trimester. It is unknown if this medication is excreted in breast milk. Skyrizi Counseling: I discussed with the patient the risks of risankizumab-rzaa including but not limited to immunosuppression, and serious infections.  The patient understands that monitoring is required including a PPD at baseline and must alert us or the primary physician if symptoms of infection or other concerning signs are noted. Dapsone Pregnancy And Lactation Text: This medication is Pregnancy Category C and is not considered safe during pregnancy or breast feeding. Dutasteride Male Counseling: Dustasteride Counseling:  I discussed with the patient the risks of use of dutasteride including but not limited to decreased libido, decreased ejaculate volume, and gynecomastia. Women who can become pregnant should not handle medication.  All of the patient's questions and concerns were addressed. Topical Ketoconazole Counseling: Patient counseled that this medication may cause skin irritation or allergic reactions.  In the event of skin irritation, the patient was advised to reduce the amount of the drug applied or use it less frequently.   The patient verbalized understanding of the proper use and possible adverse effects of ketoconazole.  All of the patient's questions and concerns were addressed. Tetracycline Pregnancy And Lactation Text: This medication is Pregnancy Category D and not consider safe during pregnancy. It is also excreted in breast milk. Gabapentin Counseling: I discussed with the patient the risks of gabapentin including but not limited to dizziness, somnolence, fatigue and ataxia. Dupixent Counseling: I discussed with the patient the risks of dupilumab including but not limited to eye infection and irritation, cold sores, injection site reactions, worsening of asthma, allergic reactions and increased risk of parasitic infection.  Live vaccines should be avoided while taking dupilumab. Dupilumab will also interact with certain medications such as warfarin and cyclosporine. The patient understands that monitoring is required and they must alert us or the primary physician if symptoms of infection or other concerning signs are noted. Opioid Pregnancy And Lactation Text: These medications can lead to premature delivery and should be avoided during pregnancy. These medications are also present in breast milk in small amounts. Cantharidin Counseling:  I discussed with the patient the risks of Cantharidin including but not limited to pain, redness, burning, itching, and blistering. Cephalexin Pregnancy And Lactation Text: This medication is Pregnancy Category B and considered safe during pregnancy.  It is also excreted in breast milk but can be used safely for shorter doses. Acitretin Counseling:  I discussed with the patient the risks of acitretin including but not limited to hair loss, dry lips/skin/eyes, liver damage, hyperlipidemia, depression/suicidal ideation, photosensitivity.  Serious rare side effects can include but are not limited to pancreatitis, pseudotumor cerebri, bony changes, clot formation/stroke/heart attack.  Patient understands that alcohol is contraindicated since it can result in liver toxicity and significantly prolong the elimination of the drug by many years. Ivermectin Pregnancy And Lactation Text: This medication is Pregnancy Category C and it isn't known if it is safe during pregnancy. It is also excreted in breast milk. Xolair Counseling:  Patient informed of potential adverse effects including but not limited to fever, muscle aches, rash and allergic reactions.  The patient verbalized understanding of the proper use and possible adverse effects of Xolair.  All of the patient's questions and concerns were addressed. Otezla Counseling: The side effects of Otezla were discussed with the patient, including but not limited to worsening or new depression, weight loss, diarrhea, nausea, upper respiratory tract infection, and headache. Patient instructed to call the office should any adverse effect occur.  The patient verbalized understanding of the proper use and possible adverse effects of Otezla.  All the patient's questions and concerns were addressed. Picato Counseling:  I discussed with the patient the risks of Picato including but not limited to erythema, scaling, itching, weeping, crusting, and pain. Winlevi Counseling:  I discussed with the patient the risks of topical clascoterone including but not limited to erythema, scaling, itching, and stinging. Patient voiced their understanding. Litfulo Pregnancy And Lactation Text: Based on animal studies, Lifulo may cause embryo-fetal harm when administered to pregnant women.  The medication should not be used in pregnancy.  Breastfeeding is not recommended during treatment. Hydroquinone Counseling:  Patient advised that medication may result in skin irritation, lightening (hypopigmentation), dryness, and burning.  In the event of skin irritation, the patient was advised to reduce the amount of the drug applied or use it less frequently.  Rarely, spots that are treated with hydroquinone can become darker (pseudoochronosis).  Should this occur, patient instructed to stop medication and call the office. The patient verbalized understanding of the proper use and possible adverse effects of hydroquinone.  All of the patient's questions and concerns were addressed. Niacinamide Pregnancy And Lactation Text: These medications are considered safe during pregnancy. Xolair Pregnancy And Lactation Text: This medication is Pregnancy Category B and is considered safe during pregnancy. This medication is excreted in breast milk. Olumiant Counseling: I discussed with the patient the risks of Olumiant therapy including but not limited to upper respiratory tract infections, shingles, cold sores, and nausea. Live vaccines should be avoided.  This medication has been linked to serious infections; higher rate of mortality; malignancy and lymphoproliferative disorders; major adverse cardiovascular events; thrombosis; gastrointestinal perforations; neutropenia; lymphopenia; anemia; liver enzyme elevations; and lipid elevations. Hydroquinone Pregnancy And Lactation Text: This medication has not been assigned a Pregnancy Risk Category but animal studies failed to show danger with the topical medication. It is unknown if the medication is excreted in breast milk. Acitretin Pregnancy And Lactation Text: This medication is Pregnancy Category X and should not be given to women who are pregnant or may become pregnant in the future. This medication is excreted in breast milk. Cimetidine Pregnancy And Lactation Text: This medication is Pregnancy Category B and is considered safe during pregnancy. It is also excreted in breast milk and breast feeding isn't recommended. Cyclosporine Pregnancy And Lactation Text: This medication is Pregnancy Category C and it isn't know if it is safe during pregnancy. This medication is excreted in breast milk. Cantharidin Pregnancy And Lactation Text: This medication has not been proven safe during pregnancy. It is unknown if this medication is excreted in breast milk. Soolantra Counseling: I discussed with the patients the risks of topial Soolantra. This is a medicine which decreases the number of mites and inflammation in the skin. You experience burning, stinging, eye irritation or allergic reactions.  Please call our office if you develop any problems from using this medication. Detail Level: Generalized Spironolactone Counseling: Patient advised regarding risks of diarrhea, abdominal pain, hyperkalemia, birth defects (for female patients), liver toxicity and renal toxicity. The patient may need blood work to monitor liver and kidney function and potassium levels while on therapy. The patient verbalized understanding of the proper use and possible adverse effects of spironolactone.  All of the patient's questions and concerns were addressed. Tranexamic Acid Counseling:  Patient advised of the small risk of bleeding problems with tranexamic acid. They were also instructed to call if they developed any nausea, vomiting or diarrhea. All of the patient's questions and concerns were addressed. Topical Ketoconazole Pregnancy And Lactation Text: This medication is Pregnancy Category B and is considered safe during pregnancy. It is unknown if it is excreted in breast milk. Dutasteride Female Counseling: Dutasteride Counseling:  I discussed with the patient the risks of use of dutasteride including but not limited to decreased libido and sexual dysfunction. Explained the teratogenic nature of the medication and stressed the importance of not getting pregnant during treatment. All of the patient's questions and concerns were addressed. Infliximab Counseling:  I discussed with the patient the risks of infliximab including but not limited to myelosuppression, immunosuppression, autoimmune hepatitis, demyelinating diseases, lymphoma, and serious infections.  The patient understands that monitoring is required including a PPD at baseline and must alert us or the primary physician if symptoms of infection or other concerning signs are noted. Aklief Pregnancy And Lactation Text: It is unknown if this medication is safe to use during pregnancy.  It is unknown if this medication is excreted in breast milk.  Breastfeeding women should use the topical cream on the smallest area of the skin for the shortest time needed while breastfeeding.  Do not apply to nipple and areola. Gabapentin Pregnancy And Lactation Text: This medication is Pregnancy Category C and isn't considered safe during pregnancy. It is excreted in breast milk. Spironolactone Pregnancy And Lactation Text: This medication can cause feminization of the male fetus and should be avoided during pregnancy. The active metabolite is also found in breast milk. Azelaic Acid Counseling: Patient counseled that medicine may cause skin irritation and to avoid applying near the eyes.  In the event of skin irritation, the patient was advised to reduce the amount of the drug applied or use it less frequently.   The patient verbalized understanding of the proper use and possible adverse effects of azelaic acid.  All of the patient's questions and concerns were addressed. 5-Fu Counseling: 5-Fluorouracil Counseling:  I discussed with the patient the risks of 5-fluorouracil including but not limited to erythema, scaling, itching, weeping, crusting, and pain. Adbry Counseling: I discussed with the patient the risks of tralokinumab including but not limited to eye infection and irritation, cold sores, injection site reactions, worsening of asthma, allergic reactions and increased risk of parasitic infection.  Live vaccines should be avoided while taking tralokinumab. The patient understands that monitoring is required and they must alert us or the primary physician if symptoms of infection or other concerning signs are noted. Topical Metronidazole Counseling: Metronidazole is a topical antibiotic medication. You may experience burning, stinging, redness, or allergic reactions.  Please call our office if you develop any problems from using this medication. Clindamycin Counseling: I counseled the patient regarding use of clindamycin as an antibiotic for prophylactic and/or therapeutic purposes. Clindamycin is active against numerous classes of bacteria, including skin bacteria. Side effects may include nausea, diarrhea, gastrointestinal upset, rash, hives, yeast infections, and in rare cases, colitis. Picato Pregnancy And Lactation Text: This medication is Pregnancy Category C. It is unknown if this medication is excreted in breast milk. Dupixent Pregnancy And Lactation Text: This medication likely crosses the placenta but the risk for the fetus is uncertain. This medication is excreted in breast milk. Winlevi Pregnancy And Lactation Text: This medication is considered safe during pregnancy and breastfeeding. Spevigo Counseling: I discussed with the patient the risks of Spevigo including but not limited to fatigue, nasuea, vomiting, headache, pruritus, urinary tract infection, an infusion related reactions.  The patient understands that monitoring is required including screening for tuberculosis at baseline and yearly screening thereafter while continuing Spevigo therapy. They should contact us if symptoms of infection or other concerning signs are noted. Fluconazole Counseling:  Patient counseled regarding adverse effects of fluconazole including but not limited to headache, diarrhea, nausea, upset stomach, liver function test abnormalities, taste disturbance, and stomach pain.  There is a rare possibility of liver failure that can occur when taking fluconazole.  The patient understands that monitoring of LFTs and kidney function test may be required, especially at baseline. The patient verbalized understanding of the proper use and possible adverse effects of fluconazole.  All of the patient's questions and concerns were addressed. Otezla Pregnancy And Lactation Text: This medication is Pregnancy Category C and it isn't known if it is safe during pregnancy. It is unknown if it is excreted in breast milk. Oxybutynin Counseling:  I discussed with the patient the risks of oxybutynin including but not limited to skin rash, drowsiness, dry mouth, difficulty urinating, and blurred vision. VTAMA Counseling: I discussed with the patient that VTAMA is not for use in the eyes, mouth or mouth. They should call the office if they develop any signs of allergic reactions to VTAMA. The patient verbalized understanding of the proper use and possible adverse effects of VTAMA.  All of the patient's questions and concerns were addressed. Fluconazole Pregnancy And Lactation Text: This medication is Pregnancy Category C and it isn't know if it is safe during pregnancy. It is also excreted in breast milk. Clindamycin Pregnancy And Lactation Text: This medication can be used in pregnancy if certain situations. Clindamycin is also present in breast milk. Imiquimod Counseling:  I discussed with the patient the risks of imiquimod including but not limited to erythema, scaling, itching, weeping, crusting, and pain.  Patient understands that the inflammatory response to imiquimod is variable from person to person and was educated regarded proper titration schedule.  If flu-like symptoms develop, patient knows to discontinue the medication and contact us. Bexarotene Counseling:  I discussed with the patient the risks of bexarotene including but not limited to hair loss, dry lips/skin/eyes, liver abnormalities, hyperlipidemia, pancreatitis, depression/suicidal ideation, photosensitivity, drug rash/allergic reactions, hypothyroidism, anemia, leukopenia, infection, cataracts, and teratogenicity.  Patient understands that they will need regular blood tests to check lipid profile, liver function tests, white blood cell count, thyroid function tests and pregnancy test if applicable. Protopic Counseling: Patient may experience a mild burning sensation during topical application. Protopic is not approved in children less than 2 years of age. There have been case reports of hematologic and skin malignancies in patients using topical calcineurin inhibitors although causality is questionable. Quinolones Counseling:  I discussed with the patient the risks of fluoroquinolones including but not limited to GI upset, allergic reaction, drug rash, diarrhea, dizziness, photosensitivity, yeast infections, liver function test abnormalities, tendonitis/tendon rupture. Enbrel Counseling:  I discussed with the patient the risks of etanercept including but not limited to myelosuppression, immunosuppression, autoimmune hepatitis, demyelinating diseases, lymphoma, and infections.  The patient understands that monitoring is required including a PPD at baseline and must alert us or the primary physician if symptoms of infection or other concerning signs are noted. Doxepin Counseling:  Patient advised that the medication is sedating and not to drive a car after taking this medication. Patient informed of potential adverse effects including but not limited to dry mouth, urinary retention, and blurry vision.  The patient verbalized understanding of the proper use and possible adverse effects of doxepin.  All of the patient's questions and concerns were addressed. EMU/MedSurg Infliximab Pregnancy And Lactation Text: This medication is Pregnancy Category B and is considered safe during pregnancy. It is unknown if this medication is excreted in breast milk. Methotrexate Counseling:  Patient counseled regarding adverse effects of methotrexate including but not limited to nausea, vomiting, abnormalities in liver function tests. Patients may develop mouth sores, rash, diarrhea, and abnormalities in blood counts. The patient understands that monitoring is required including LFT's and blood counts.  There is a rare possibility of scarring of the liver and lung problems that can occur when taking methotrexate. Persistent nausea, loss of appetite, pale stools, dark urine, cough, and shortness of breath should be reported immediately. Patient advised to discontinue methotrexate treatment at least three months before attempting to become pregnant.  I discussed the need for folate supplements while taking methotrexate.  These supplements can decrease side effects during methotrexate treatment. The patient verbalized understanding of the proper use and possible adverse effects of methotrexate.  All of the patient's questions and concerns were addressed. Dutasteride Pregnancy And Lactation Text: This medication is absolutely contraindicated in women, especially during pregnancy and breast feeding. Feminization of male fetuses is possible if taking while pregnant. Olumiant Pregnancy And Lactation Text: Based on animal studies, Olumiant may cause embryo-fetal harm when administered to pregnant women.  The medication should not be used in pregnancy.  Breastfeeding is not recommended during treatment. Arava Counseling:  Patient counseled regarding adverse effects of Arava including but not limited to nausea, vomiting, abnormalities in liver function tests. Patients may develop mouth sores, rash, diarrhea, and abnormalities in blood counts. The patient understands that monitoring is required including LFTs and blood counts.  There is a rare possibility of scarring of the liver and lung problems that can occur when taking methotrexate. Persistent nausea, loss of appetite, pale stools, dark urine, cough, and shortness of breath should be reported immediately. Patient advised to discontinue Arava treatment and consult with a physician prior to attempting conception. The patient will have to undergo a treatment to eliminate Arava from the body prior to conception. Include Pregnancy/Lactation Warning?: No Tranexamic Acid Pregnancy And Lactation Text: It is unknown if this medication is safe during pregnancy or breast feeding. Soolantra Pregnancy And Lactation Text: This medication is Pregnancy Category C. This medication is considered safe during breast feeding. Finasteride Male Counseling: Finasteride Counseling:  I discussed with the patient the risks of use of finasteride including but not limited to decreased libido, decreased ejaculate volume, gynecomastia, and depression. Women should not handle medication.  All of the patient's questions and concerns were addressed. Azelaic Acid Pregnancy And Lactation Text: This medication is considered safe during pregnancy and breast feeding. Adbry Pregnancy And Lactation Text: It is unknown if this medication will adversely affect pregnancy or breast feeding. Methotrexate Pregnancy And Lactation Text: This medication is Pregnancy Category X and is known to cause fetal harm. This medication is excreted in breast milk. Doxepin Pregnancy And Lactation Text: This medication is Pregnancy Category C and it isn't known if it is safe during pregnancy. It is also excreted in breast milk and breast feeding isn't recommended. Valtrex Counseling: I discussed with the patient the risks of valacyclovir including but not limited to kidney damage, nausea, vomiting and severe allergy.  The patient understands that if the infection seems to be worsening or is not improving, they are to call. Rituxan Counseling:  I discussed with the patient the risks of Rituxan infusions. Side effects can include infusion reactions, severe drug rashes including mucocutaneous reactions, reactivation of latent hepatitis and other infections and rarely progressive multifocal leukoencephalopathy.  All of the patient's questions and concerns were addressed. Erivedge Counseling- I discussed with the patient the risks of Erivedge including but not limited to nausea, vomiting, diarrhea, constipation, weight loss, changes in the sense of taste, decreased appetite, muscle spasms, and hair loss.  The patient verbalized understanding of the proper use and possible adverse effects of Erivedge.  All of the patient's questions and concerns were addressed. Spevigo Pregnancy And Lactation Text: The risk during pregnancy and breastfeeding is uncertain with this medication. This medication does cross the placenta. It is unknown if this medication is found in breast milk. Klisyri Pregnancy And Lactation Text: It is unknown if this medication can harm a developing fetus or if it is excreted in breast milk. Topical Metronidazole Pregnancy And Lactation Text: This medication is Pregnancy Category B and considered safe during pregnancy.  It is also considered safe to use while breastfeeding. Glycopyrrolate Counseling:  I discussed with the patient the risks of glycopyrrolate including but not limited to skin rash, drowsiness, dry mouth, difficulty urinating, and blurred vision. 5-Fu Pregnancy And Lactation Text: This medication is Pregnancy Category X and contraindicated in pregnancy and in women who may become pregnant. It is unknown if this medication is excreted in breast milk. Drysol Counseling:  I discussed with the patient the risks of drysol/aluminum chloride including but not limited to skin rash, itching, irritation, burning. Doxycycline Counseling:  Patient counseled regarding possible photosensitivity and increased risk for sunburn.  Patient instructed to avoid sunlight, if possible.  When exposed to sunlight, patients should wear protective clothing, sunglasses, and sunscreen.  The patient was instructed to call the office immediately if the following severe adverse effects occur:  hearing changes, easy bruising/bleeding, severe headache, or vision changes.  The patient verbalized understanding of the proper use and possible adverse effects of doxycycline.  All of the patient's questions and concerns were addressed. Azathioprine Counseling:  I discussed with the patient the risks of azathioprine including but not limited to myelosuppression, immunosuppression, hepatotoxicity, lymphoma, and infections.  The patient understands that monitoring is required including baseline LFTs, Creatinine, possible TPMP genotyping and weekly CBCs for the first month and then every 2 weeks thereafter.  The patient verbalized understanding of the proper use and possible adverse effects of azathioprine.  All of the patient's questions and concerns were addressed. Stelara Counseling:  I discussed with the patient the risks of ustekinumab including but not limited to immunosuppression, malignancy, posterior leukoencephalopathy syndrome, and serious infections.  The patient understands that monitoring is required including a PPD at baseline and must alert us or the primary physician if symptoms of infection or other concerning signs are noted. Protopic Pregnancy And Lactation Text: This medication is Pregnancy Category C. It is unknown if this medication is excreted in breast milk when applied topically. Topical Retinoid counseling:  Patient advised to apply a pea-sized amount only at bedtime and wait 30 minutes after washing their face before applying.  If too drying, patient may add a non-comedogenic moisturizer. The patient verbalized understanding of the proper use and possible adverse effects of retinoids.  All of the patient's questions and concerns were addressed. Griseofulvin Counseling:  I discussed with the patient the risks of griseofulvin including but not limited to photosensitivity, cytopenia, liver damage, nausea/vomiting and severe allergy.  The patient understands that this medication is best absorbed when taken with a fatty meal (e.g., ice cream or french fries). Vtama Pregnancy And Lactation Text: It is unknown if this medication can cause problems during pregnancy and breastfeeding. Rinvoq Counseling: I discussed with the patient the risks of Rinvoq therapy including but not limited to upper respiratory tract infections, shingles, cold sores, bronchitis, nausea, cough, fever, acne, and headache. Live vaccines should be avoided.  This medication has been linked to serious infections; higher rate of mortality; malignancy and lymphoproliferative disorders; major adverse cardiovascular events; thrombosis; thrombocytopenia, anemia, and neutropenia; lipid elevations; liver enzyme elevations; and gastrointestinal perforations. Bexarotene Pregnancy And Lactation Text: This medication is Pregnancy Category X and should not be given to women who are pregnant or may become pregnant. This medication should not be used if you are breast feeding. Klisyri Counseling:  I discussed with the patient the risks of Klisyri including but not limited to erythema, scaling, itching, weeping, crusting, and pain. Clofazimine Counseling:  I discussed with the patient the risks of clofazimine including but not limited to skin and eye pigmentation, liver damage, nausea/vomiting, gastrointestinal bleeding and allergy. Rinvoq Pregnancy And Lactation Text: Based on animal studies, Rinvoq may cause embryo-fetal harm when administered to pregnant women.  The medication should not be used in pregnancy.  Breastfeeding is not recommended during treatment and for 6 days after the last dose. Rifampin Counseling: I discussed with the patient the risks of rifampin including but not limited to liver damage, kidney damage, red-orange body fluids, nausea/vomiting and severe allergy. Isotretinoin Counseling: Patient should get monthly blood tests, not donate blood, not drive at night if vision affected, not share medication, and not undergo elective surgery for 6 months after tx completed. Side effects reviewed, pt to contact office should one occur. Hydroxyzine Counseling: Patient advised that the medication is sedating and not to drive a car after taking this medication.  Patient informed of potential adverse effects including but not limited to dry mouth, urinary retention, and blurry vision.  The patient verbalized understanding of the proper use and possible adverse effects of hydroxyzine.  All of the patient's questions and concerns were addressed. Benzoyl Peroxide Counseling: Patient counseled that medicine may cause skin irritation and bleach clothing.  In the event of skin irritation, the patient was advised to reduce the amount of the drug applied or use it less frequently.   The patient verbalized understanding of the proper use and possible adverse effects of benzoyl peroxide.  All of the patient's questions and concerns were addressed. Prednisone Counseling:  I discussed with the patient the risks of prolonged use of prednisone including but not limited to weight gain, insomnia, osteoporosis, mood changes, diabetes, susceptibility to infection, glaucoma and high blood pressure.  In cases where prednisone use is prolonged, patients should be monitored with blood pressure checks, serum glucose levels and an eye exam.  Additionally, the patient may need to be placed on GI prophylaxis, PCP prophylaxis, and calcium and vitamin D supplementation and/or a bisphosphonate.  The patient verbalized understanding of the proper use and the possible adverse effects of prednisone.  All of the patient's questions and concerns were addressed. Minoxidil Counseling: Minoxidil is a topical medication which can increase blood flow where it is applied. It is uncertain how this medication increases hair growth. Side effects are uncommon and include stinging and allergic reactions. Nsaids Counseling: NSAID Counseling: I discussed with the patient that NSAIDs should be taken with food. Prolonged use of NSAIDs can result in the development of stomach ulcers.  Patient advised to stop taking NSAIDs if abdominal pain occurs.  The patient verbalized understanding of the proper use and possible adverse effects of NSAIDs.  All of the patient's questions and concerns were addressed. Bimzelx Counseling:  I discussed with the patient the risks of Bimzelx including but not limited to depression, immunosuppression, allergic reactions and infections.  The patient understands that monitoring is required including a PPD at baseline and must alert us or the primary physician if symptoms of infection or other concerning signs are noted. Valtrex Pregnancy And Lactation Text: this medication is Pregnancy Category B and is considered safe during pregnancy. This medication is not directly found in breast milk but it's metabolite acyclovir is present. Topical Steroids Counseling: I discussed with the patient that prolonged use of topical steroids can result in the increased appearance of superficial blood vessels (telangiectasias), lightening (hypopigmentation) and thinning of the skin (atrophy).  Patient understands to avoid using high potency steroids in skin folds, the groin or the face.  The patient verbalized understanding of the proper use and possible adverse effects of topical steroids.  All of the patient's questions and concerns were addressed. Glycopyrrolate Pregnancy And Lactation Text: This medication is Pregnancy Category B and is considered safe during pregnancy. It is unknown if it is excreted breast milk. Rituxan Pregnancy And Lactation Text: This medication is Pregnancy Category C and it isn't know if it is safe during pregnancy. It is unknown if this medication is excreted in breast milk but similar antibodies are known to be excreted. Finasteride Female Counseling: Finasteride Counseling:  I discussed with the patient the risks of use of finasteride including but not limited to decreased libido and sexual dysfunction. Explained the teratogenic nature of the medication and stressed the importance of not getting pregnant during treatment. All of the patient's questions and concerns were addressed. Topical Steroids Applications Pregnancy And Lactation Text: Most topical steroids are considered safe to use during pregnancy and lactation.  Any topical steroid applied to the breast or nipple should be washed off before breastfeeding. Hydroxychloroquine Counseling:  I discussed with the patient that a baseline ophthalmologic exam is needed at the start of therapy and every year thereafter while on therapy. A CBC may also be warranted for monitoring.  The side effects of this medication were discussed with the patient, including but not limited to agranulocytosis, aplastic anemia, seizures, rashes, retinopathy, and liver toxicity. Patient instructed to call the office should any adverse effect occur.  The patient verbalized understanding of the proper use and possible adverse effects of Plaquenil.  All the patient's questions and concerns were addressed. Azithromycin Counseling:  I discussed with the patient the risks of azithromycin including but not limited to GI upset, allergic reaction, drug rash, diarrhea, and yeast infections. Libtayo Counseling- I discussed with the patient the risks of Libtayo including but not limited to nausea, vomiting, diarrhea, and bone or muscle pain.  The patient verbalized understanding of the proper use and possible adverse effects of Libtayo.  All of the patient's questions and concerns were addressed. Doxycycline Pregnancy And Lactation Text: This medication is Pregnancy Category D and not consider safe during pregnancy. It is also excreted in breast milk but is considered safe for shorter treatment courses. Nsaids Pregnancy And Lactation Text: These medications are considered safe up to 30 weeks gestation. It is excreted in breast milk. Bimzelx Pregnancy And Lactation Text: This medication crosses the placenta and the safety is uncertain during pregnancy. It is unknown if this medication is present in breast milk. Humira Counseling:  I discussed with the patient the risks of adalimumab including but not limited to myelosuppression, immunosuppression, autoimmune hepatitis, demyelinating diseases, lymphoma, and serious infections.  The patient understands that monitoring is required including a PPD at baseline and must alert us or the primary physician if symptoms of infection or other concerning signs are noted. Azathioprine Pregnancy And Lactation Text: This medication is Pregnancy Category D and isn't considered safe during pregnancy. It is unknown if this medication is excreted in breast milk. Zoryve Counseling:  I discussed with the patient that Zoryve is not for use in the eyes, mouth or vagina. The most commonly reported side effects include diarrhea, headache, insomnia, application site pain, upper respiratory tract infections, and urinary tract infections.  All of the patient's questions and concerns were addressed. Griseofulvin Pregnancy And Lactation Text: This medication is Pregnancy Category X and is known to cause serious birth defects. It is unknown if this medication is excreted in breast milk but breast feeding should be avoided. Qbrexza Counseling:  I discussed with the patient the risks of Qbrexza including but not limited to headache, mydriasis, blurred vision, dry eyes, nasal dryness, dry mouth, dry throat, dry skin, urinary hesitation, and constipation.  Local skin reactions including erythema, burning, stinging, and itching can also occur. Propranolol Counseling:  I discussed with the patient the risks of propranolol including but not limited to low heart rate, low blood pressure, low blood sugar, restlessness and increased cold sensitivity. They should call the office if they experience any of these side effects. Propranolol Pregnancy And Lactation Text: This medication is Pregnancy Category C and it isn't known if it is safe during pregnancy. It is excreted in breast milk. Hydroxyzine Pregnancy And Lactation Text: This medication is not safe during pregnancy and should not be taken. It is also excreted in breast milk and breast feeding isn't recommended. Isotretinoin Pregnancy And Lactation Text: This medication is Pregnancy Category X and is considered extremely dangerous during pregnancy. It is unknown if it is excreted in breast milk. Rifampin Pregnancy And Lactation Text: This medication is Pregnancy Category C and it isn't know if it is safe during pregnancy. It is also excreted in breast milk and should not be used if you are breast feeding. Itraconazole Counseling:  I discussed with the patient the risks of itraconazole including but not limited to liver damage, nausea/vomiting, neuropathy, and severe allergy.  The patient understands that this medication is best absorbed when taken with acidic beverages such as non-diet cola or ginger ale.  The patient understands that monitoring is required including baseline LFTs and repeat LFTs at intervals.  The patient understands that they are to contact us or the primary physician if concerning signs are noted. Sotyktu Counseling:  I discussed the most common side effects of Sotyktu including: common cold, sore throat, sinus infections, cold sores, canker sores, folliculitis, and acne.? I also discussed more serious side effects of Sotyktu including but not limited to: serious allergic reactions; increased risk for infections such as TB; cancers such as lymphomas; rhabdomyolysis and elevated CPK; and elevated triglycerides and liver enzymes.? Siliq Counseling:  I discussed with the patient the risks of Siliq including but not limited to new or worsening depression, suicidal thoughts and behavior, immunosuppression, malignancy, posterior leukoencephalopathy syndrome, and serious infections.  The patient understands that monitoring is required including a PPD at baseline and must alert us or the primary physician if symptoms of infection or other concerning signs are noted. There is also a special program designed to monitor depression which is required with Siliq. Tazorac Counseling:  Patient advised that medication is irritating and drying.  Patient may need to apply sparingly and wash off after an hour before eventually leaving it on overnight.  The patient verbalized understanding of the proper use and possible adverse effects of tazorac.  All of the patient's questions and concerns were addressed. Benzoyl Peroxide Pregnancy And Lactation Text: This medication is Pregnancy Category C. It is unknown if benzoyl peroxide is excreted in breast milk. Cimzia Counseling:  I discussed with the patient the risks of Cimzia including but not limited to immunosuppression, allergic reactions and infections.  The patient understands that monitoring is required including a PPD at baseline and must alert us or the primary physician if symptoms of infection or other concerning signs are noted. Libtayo Pregnancy And Lactation Text: This medication is contraindicated in pregnancy and when breast feeding. Carac Counseling:  I discussed with the patient the risks of Carac including but not limited to erythema, scaling, itching, weeping, crusting, and pain. Mirvaso Counseling: Mirvaso is a topical medication which can decrease superficial blood flow where applied. Side effects are uncommon and include stinging, redness and allergic reactions. Qbrexza Pregnancy And Lactation Text: There is no available data on Qbrexza use in pregnant women.  There is no available data on Qbrexza use in lactation. Erythromycin Counseling:  I discussed with the patient the risks of erythromycin including but not limited to GI upset, allergic reaction, drug rash, diarrhea, increase in liver enzymes, and yeast infections. Olanzapine Counseling- I discussed with the patient the common side effects of olanzapine including but are not limited to: lack of energy, dry mouth, increased appetite, sleepiness, tremor, constipation, dizziness, changes in behavior, or restlessness.  Explained that teenagers are more likely to experience headaches, abdominal pain, pain in the arms or legs, tiredness, and sleepiness.  Serious side effects include but are not limited: increased risk of death in elderly patients who are confused, have memory loss, or dementia-related psychosis; hyperglycemia; increased cholesterol and triglycerides; and weight gain. Taltz Counseling: I discussed with the patient the risks of ixekizumab including but not limited to immunosuppression, serious infections, worsening of inflammatory bowel disease and drug reactions.  The patient understands that monitoring is required including a PPD at baseline and must alert us or the primary physician if symptoms of infection or other concerning signs are noted. Cellcept Counseling:  I discussed with the patient the risks of mycophenolate mofetil including but not limited to infection/immunosuppression, GI upset, hypokalemia, hypercholesterolemia, bone marrow suppression, lymphoproliferative disorders, malignancy, GI ulceration/bleed/perforation, colitis, interstitial lung disease, kidney failure, progressive multifocal leukoencephalopathy, and birth defects.  The patient understands that monitoring is required including a baseline creatinine and regular CBC testing. In addition, patient must alert us immediately if symptoms of infection or other concerning signs are noted. Topical Sulfur Applications Counseling: Topical Sulfur Counseling: Patient counseled that this medication may cause skin irritation or allergic reactions.  In the event of skin irritation, the patient was advised to reduce the amount of the drug applied or use it less frequently.   The patient verbalized understanding of the proper use and possible adverse effects of topical sulfur application.  All of the patient's questions and concerns were addressed. Elidel Counseling: Patient may experience a mild burning sensation during topical application. Elidel is not approved in children less than 2 years of age. There have been case reports of hematologic and skin malignancies in patients using topical calcineurin inhibitors although causality is questionable. Hydroxychloroquine Pregnancy And Lactation Text: This medication has been shown to cause fetal harm but it isn't assigned a Pregnancy Risk Category. There are small amounts excreted in breast milk. Zyclara Counseling:  I discussed with the patient the risks of imiquimod including but not limited to erythema, scaling, itching, weeping, crusting, and pain.  Patient understands that the inflammatory response to imiquimod is variable from person to person and was educated regarded proper titration schedule.  If flu-like symptoms develop, patient knows to discontinue the medication and contact us. Cibinqo Counseling: I discussed with the patient the risks of Cibinqo therapy including but not limited to common cold, nausea, headache, cold sores, increased blood CPK levels, dizziness, UTIs, fatigue, acne, and vomitting. Live vaccines should be avoided.  This medication has been linked to serious infections; higher rate of mortality; malignancy and lymphoproliferative disorders; major adverse cardiovascular events; thrombosis; thrombocytopenia and lymphopenia; lipid elevations; and retinal detachment. Erythromycin Pregnancy And Lactation Text: This medication is Pregnancy Category B and is considered safe during pregnancy. It is also excreted in breast milk. Low Dose Naltrexone Counseling- I discussed with the patient the potential risks and side effects of low dose naltrexone including but not limited to: more vivid dreams, headaches, nausea, vomiting, abdominal pain, fatigue, dizziness, and anxiety. High Dose Vitamin A Counseling: Side effects reviewed, pt to contact office should one occur. Rhofade Counseling: Rhofade is a topical medication which can decrease superficial blood flow where applied. Side effects are uncommon and include stinging, redness and allergic reactions. Sarecycline Counseling: Patient advised regarding possible photosensitivity and discoloration of the teeth, skin, lips, tongue and gums.  Patient instructed to avoid sunlight, if possible.  When exposed to sunlight, patients should wear protective clothing, sunglasses, and sunscreen.  The patient was instructed to call the office immediately if the following severe adverse effects occur:  hearing changes, easy bruising/bleeding, severe headache, or vision changes.  The patient verbalized understanding of the proper use and possible adverse effects of sarecycline.  All of the patient's questions and concerns were addressed. SSKI Counseling:  I discussed with the patient the risks of SSKI including but not limited to thyroid abnormalities, metallic taste, GI upset, fever, headache, acne, arthralgias, paraesthesias, lymphadenopathy, easy bleeding, arrhythmias, and allergic reaction. Sotyktu Pregnancy And Lactation Text: There is insufficient data to evaluate whether or not Sotyktu is safe to use during pregnancy.? ?It is not known if Sotyktu passes into breast milk and whether or not it is safe to use when breastfeeding.?? Colchicine Counseling:  Patient counseled regarding adverse effects including but not limited to stomach upset (nausea, vomiting, stomach pain, or diarrhea).  Patient instructed to limit alcohol consumption while taking this medication.  Colchicine may reduce blood counts especially with prolonged use.  The patient understands that monitoring of kidney function and blood counts may be required, especially at baseline. The patient verbalized understanding of the proper use and possible adverse effects of colchicine.  All of the patient's questions and concerns were addressed. Tazorac Pregnancy And Lactation Text: This medication is not safe during pregnancy. It is unknown if this medication is excreted in breast milk. Ketoconazole Pregnancy And Lactation Text: This medication is Pregnancy Category C and it isn't know if it is safe during pregnancy. It is also excreted in breast milk and breast feeding isn't recommended. Hyrimoz Counseling:  I discussed with the patient the risks of adalimumab including but not limited to myelosuppression, immunosuppression, autoimmune hepatitis, demyelinating diseases, lymphoma, and serious infections.  The patient understands that monitoring is required including a PPD at baseline and must alert us or the primary physician if symptoms of infection or other concerning signs are noted. Azithromycin Pregnancy And Lactation Text: This medication is considered safe during pregnancy and is also secreted in breast milk. Mirvaso Pregnancy And Lactation Text: This medication has not been assigned a Pregnancy Risk Category. It is unknown if the medication is excreted in breast milk. Simponi Counseling:  I discussed with the patient the risks of golimumab including but not limited to myelosuppression, immunosuppression, autoimmune hepatitis, demyelinating diseases, lymphoma, and serious infections.  The patient understands that monitoring is required including a PPD at baseline and must alert us or the primary physician if symptoms of infection or other concerning signs are noted. Bactrim Counseling:  I discussed with the patient the risks of sulfa antibiotics including but not limited to GI upset, allergic reaction, drug rash, diarrhea, dizziness, photosensitivity, and yeast infections.  Rarely, more serious reactions can occur including but not limited to aplastic anemia, agranulocytosis, methemoglobinemia, blood dyscrasias, liver or kidney failure, lung infiltrates or desquamative/blistering drug rashes. Terbinafine Counseling: Patient counseling regarding adverse effects of terbinafine including but not limited to headache, diarrhea, rash, upset stomach, liver function test abnormalities, itching, taste/smell disturbance, nausea, abdominal pain, and flatulence.  There is a rare possibility of liver failure that can occur when taking terbinafine.  The patient understands that a baseline LFT and kidney function test may be required. The patient verbalized understanding of the proper use and possible adverse effects of terbinafine.  All of the patient's questions and concerns were addressed. Topical Clindamycin Counseling: Patient counseled that this medication may cause skin irritation or allergic reactions.  In the event of skin irritation, the patient was advised to reduce the amount of the drug applied or use it less frequently.   The patient verbalized understanding of the proper use and possible adverse effects of clindamycin.  All of the patient's questions and concerns were addressed. Cimzia Pregnancy And Lactation Text: This medication crosses the placenta but can be considered safe in certain situations. Cimzia may be excreted in breast milk. Odomzo Counseling- I discussed with the patient the risks of Odomzo including but not limited to nausea, vomiting, diarrhea, constipation, weight loss, changes in the sense of taste, decreased appetite, muscle spasms, and hair loss.  The patient verbalized understanding of the proper use and possible adverse effects of Odomzo.  All of the patient's questions and concerns were addressed. Albendazole Counseling:  I discussed with the patient the risks of albendazole including but not limited to cytopenia, kidney damage, nausea/vomiting and severe allergy.  The patient understands that this medication is being used in an off-label manner. Topical Sulfur Applications Pregnancy And Lactation Text: This medication is Pregnancy Category C and has an unknown safety profile during pregnancy. It is unknown if this topical medication is excreted in breast milk. Olanzapine Pregnancy And Lactation Text: This medication is pregnancy category C.   There are no adequate and well controlled trials with olanzapine in pregnant females.  Olanzapine should be used during pregnancy only if the potential benefit justifies the potential risk to the fetus.   In a study in lactating healthy women, olanzapine was excreted in breast milk.  It is recommended that women taking olanzapine should not breast feed. Oral Minoxidil Counseling- I discussed with the patient the risks of oral minoxidil including but not limited to shortness of breath, swelling of the feet or ankles, dizziness, lightheadedness, unwanted hair growth and allergic reaction.  The patient verbalized understanding of the proper use and possible adverse effects of oral minoxidil.  All of the patient's questions and concerns were addressed. Low Dose Naltrexone Pregnancy And Lactation Text: Naltrexone is pregnancy category C.  There have been no adequate and well-controlled studies in pregnant women.  It should be used in pregnancy only if the potential benefit justifies the potential risk to the fetus.   Limited data indicates that naltrexone is minimally excreted into breastmilk. Eucrisa Counseling: Patient may experience a mild burning sensation during topical application. Eucrisa is not approved in children less than 2 years of age. Metronidazole Counseling:  I discussed with the patient the risks of metronidazole including but not limited to seizures, nausea/vomiting, a metallic taste in the mouth, nausea/vomiting and severe allergy. Tremfya Counseling: I discussed with the patient the risks of guselkumab including but not limited to immunosuppression, serious infections, and drug reactions.  The patient understands that monitoring is required including a PPD at baseline and must alert us or the primary physician if symptoms of infection or other concerning signs are noted. Cosentyx Counseling:  I discussed with the patient the risks of Cosentyx including but not limited to worsening of Crohn's disease, immunosuppression, allergic reactions and infections.  The patient understands that monitoring is required including a PPD at baseline and must alert us or the primary physician if symptoms of infection or other concerning signs are noted. Finasteride Pregnancy And Lactation Text: This medication is absolutely contraindicated during pregnancy. It is unknown if it is excreted in breast milk. High Dose Vitamin A Pregnancy And Lactation Text: High dose vitamin A therapy is contraindicated during pregnancy and breast feeding. Cyclophosphamide Counseling:  I discussed with the patient the risks of cyclophosphamide including but not limited to hair loss, hormonal abnormalities, decreased fertility, abdominal pain, diarrhea, nausea and vomiting, bone marrow suppression and infection. The patient understands that monitoring is required while taking this medication. Sski Pregnancy And Lactation Text: This medication is Pregnancy Category D and isn't considered safe during pregnancy. It is excreted in breast milk. Cibinqo Pregnancy And Lactation Text: It is unknown if this medication will adversely affect pregnancy or breast feeding.  You should not take this medication if you are currently pregnant or planning a pregnancy or while breastfeeding. Ketoconazole Counseling:   Patient counseled regarding improving absorption with orange juice.  Adverse effects include but are not limited to breast enlargement, headache, diarrhea, nausea, upset stomach, liver function test abnormalities, taste disturbance, and stomach pain.  There is a rare possibility of liver failure that can occur when taking ketoconazole. The patient understands that monitoring of LFTs may be required, especially at baseline. The patient verbalized understanding of the proper use and possible adverse effects of ketoconazole.  All of the patient's questions and concerns were addressed. Xeljanz Counseling: I discussed with the patient the risks of Xeljanz therapy including increased risk of infection, liver issues, headache, diarrhea, or cold symptoms. Live vaccines should be avoided. They were instructed to call if they have any problems. Xellinseyz Pregnancy And Lactation Text: This medication is Pregnancy Category D and is not considered safe during pregnancy.  The risk during breast feeding is also uncertain. Cyclophosphamide Pregnancy And Lactation Text: This medication is Pregnancy Category D and it isn't considered safe during pregnancy. This medication is excreted in breast milk. Tetracycline Counseling: Patient counseled regarding possible photosensitivity and increased risk for sunburn.  Patient instructed to avoid sunlight, if possible.  When exposed to sunlight, patients should wear protective clothing, sunglasses, and sunscreen.  The patient was instructed to call the office immediately if the following severe adverse effects occur:  hearing changes, easy bruising/bleeding, severe headache, or vision changes.  The patient verbalized understanding of the proper use and possible adverse effects of tetracycline.  All of the patient's questions and concerns were addressed. Patient understands to avoid pregnancy while on therapy due to potential birth defects. Birth Control Pills Counseling: Birth Control Pill Counseling: I discussed with the patient the potential side effects of OCPs including but not limited to increased risk of stroke, heart attack, thrombophlebitis, deep venous thrombosis, hepatic adenomas, breast changes, GI upset, headaches, and depression.  The patient verbalized understanding of the proper use and possible adverse effects of OCPs. All of the patient's questions and concerns were addressed. Opzelura Counseling:  I discussed with the patient the risks of Opzelura including but not limited to nasopharngitis, bronchitis, ear infection, eosinophila, hives, diarrhea, folliculitis, tonsillitis, and rhinorrhea.  Taken orally, this medication has been linked to serious infections; higher rate of mortality; malignancy and lymphoproliferative disorders; major adverse cardiovascular events; thrombosis; thrombocytopenia, anemia, and neutropenia; and lipid elevations. Bactrim Pregnancy And Lactation Text: This medication is Pregnancy Category D and is known to cause fetal risk.  It is also excreted in breast milk. Wartpeel Counseling:  I discussed with the patient the risks of Wartpeel including but not limited to erythema, scaling, itching, weeping, crusting, and pain. Dapsone Counseling: I discussed with the patient the risks of dapsone including but not limited to hemolytic anemia, agranulocytosis, rashes, methemoglobinemia, kidney failure, peripheral neuropathy, headaches, GI upset, and liver toxicity.  Patients who start dapsone require monitoring including baseline LFTs and weekly CBCs for the first month, then every month thereafter.  The patient verbalized understanding of the proper use and possible adverse effects of dapsone.  All of the patient's questions and concerns were addressed. Calcipotriene Counseling:  I discussed with the patient the risks of calcipotriene including but not limited to erythema, scaling, itching, and irritation. Calcipotriene Pregnancy And Lactation Text: The use of this medication during pregnancy or lactation is not recommended as there is insufficient data. Oral Minoxidil Pregnancy And Lactation Text: This medication should only be used when clearly needed if you are pregnant, attempting to become pregnant or breast feeding. Cephalexin Counseling: I counseled the patient regarding use of cephalexin as an antibiotic for prophylactic and/or therapeutic purposes. Cephalexin (commonly prescribed under brand name Keflex) is a cephalosporin antibiotic which is active against numerous classes of bacteria, including most skin bacteria. Side effects may include nausea, diarrhea, gastrointestinal upset, rash, hives, yeast infections, and in rare cases, hepatitis, kidney disease, seizures, fever, confusion, neurologic symptoms, and others. Patients with severe allergies to penicillin medications are cautioned that there is about a 10% incidence of cross-reactivity with cephalosporins. When possible, patients with penicillin allergies should use alternatives to cephalosporins for antibiotic therapy. Opioid Counseling: I discussed with the patient the potential side effects of opioids including but not limited to addiction, altered mental status, and depression. I stressed avoiding alcohol, benzodiazepines, muscle relaxants and sleep aids unless specifically okayed by a physician. The patient verbalized understanding of the proper use and possible adverse effects of opioids. All of the patient's questions and concerns were addressed. They were instructed to flush the remaining pills down the toilet if they did not need them for pain. Opzelura Pregnancy And Lactation Text: There is insufficient data to evaluate drug-associated risk for major birth defects, miscarriage, or other adverse maternal or fetal outcomes.  There is a pregnancy registry that monitors pregnancy outcomes in pregnant persons exposed to the medication during pregnancy.  It is unknown if this medication is excreted in breast milk.  Do not breastfeed during treatment and for about 4 weeks after the last dose. Metronidazole Pregnancy And Lactation Text: This medication is Pregnancy Category B and considered safe during pregnancy.  It is also excreted in breast milk. Ilumya Counseling: I discussed with the patient the risks of tildrakizumab including but not limited to immunosuppression, malignancy, posterior leukoencephalopathy syndrome, and serious infections.  The patient understands that monitoring is required including a PPD at baseline and must alert us or the primary physician if symptoms of infection or other concerning signs are noted. Solaraze Counseling:  I discussed with the patient the risks of Solaraze including but not limited to erythema, scaling, itching, weeping, crusting, and pain. Litfulo Counseling: I discussed with the patient the risks of Litfulo therapy including but not limited to upper respiratory tract infections, shingles, cold sores, and nausea. Live vaccines should be avoided.  This medication has been linked to serious infections; higher rate of mortality; malignancy and lymphoproliferative disorders; major adverse cardiovascular events; thrombosis; gastrointestinal perforations; neutropenia; lymphopenia; anemia; liver enzyme elevations; and lipid elevations. Thalidomide Counseling: I discussed with the patient the risks of thalidomide including but not limited to birth defects, anxiety, weakness, chest pain, dizziness, cough and severe allergy. Niacinamide Counseling: I recommended taking niacin or niacinamide, also know as vitamin B3, twice daily. Recent evidence suggests that taking vitamin B3 (500 mg twice daily) can reduce the risk of actinic keratoses and non-melanoma skin cancers. Side effects of vitamin B3 include flushing and headache. Ivermectin Counseling:  Patient instructed to take medication on an empty stomach with a full glass of water.  Patient informed of potential adverse effects including but not limited to nausea, diarrhea, dizziness, itching, and swelling of the extremities or lymph nodes.  The patient verbalized understanding of the proper use and possible adverse effects of ivermectin.  All of the patient's questions and concerns were addressed.

## 2025-01-07 NOTE — PROGRESS NOTE ADULT - SUBJECTIVE AND OBJECTIVE BOX
Neurology - Progress Note    -    -    87 right handed female with history of colitis and TIA (MRI 09/2022 negative) admitted for Multiple episodes of staring spells. No acute events over night.      Review of Systems:  INCOMPLETE   CONSTITUTIONAL: No fevers or chills  EYES AND ENT: No visual changes or no throat pain   NECK: No pain or stiffness  RESPIRATORY: No hemoptysis or shortness of breath  CARDIOVASCULAR: No chest pain or palpitations  GASTROINTESTINAL: No melena or hematochezia  GENITOURINARY: No dysuria or hematuria  NEUROLOGICAL: +As stated in HPI above  SKIN: No itching, burning, rashes, or lesions   All other review of systems is negative unless indicated above.    Allergies:      PMHx/PSHx/Family Hx: As above, otherwise see below   Arthritis    Asthma    Malignant neoplasm of unspecified site of right female breast        Social Hx:  No current use of tobacco, alcohol, or illicit drugs    Medications:  MEDICATIONS  (STANDING):  enoxaparin Injectable 40 milliGRAM(s) SubCutaneous every 24 hours  influenza  Vaccine (HIGH DOSE) 0.7 milliLiter(s) IntraMuscular once  melatonin 5 milliGRAM(s) Oral at bedtime    MEDICATIONS  (PRN):  brivaracetam  Injectable 100 milliGRAM(s) IV Push once PRN seizure  LORazepam   Injectable 1 milliGRAM(s) IV Push once PRN seizure      Vitals:  T(C): 36.8 (11-29-22 @ 05:22), Max: 36.9 (11-29-22 @ 00:31)  HR: 74 (11-29-22 @ 05:22) (72 - 83)  BP: 128/73 (11-29-22 @ 05:22) (111/67 - 128/73)  RR: 18 (11-29-22 @ 05:22) (16 - 18)  SpO2: 94% (11-29-22 @ 05:22) (94% - 95%)    Physical Examination: INCOMPLETE  General - NAD  Cardiovascular - Peripheral pulses palpable, no edema  Eyes - Fundoscopy with flat, sharp optic discs and no hemorrhage or exudates; Fundoscopy not well visualized; Fundoscopy not performed due to safety precautions in the setting of the COVID-19 pandemic    Neurologic Exam:  Mental status - Awake, Alert, Oriented to person, place, and time. Speech fluent, repetition and naming intact. Follows simple and complex commands. Attention/concentration, recent and remote memory (including registration and recall), and fund of knowledge intact    Cranial nerves - PERRLA, VFF, EOMI, face sensation (V1-V3) intact b/l, facial strength intact without asymmetry b/l, hearing intact b/l, palate with symmetric elevation, trapezius OR sternocleidomastiod 5/5 strength b/l, tongue midline on protrusion with full lateral movement    Motor - Normal bulk and tone throughout. No pronator drift.  Strength testing            Deltoid      Biceps      Triceps     Wrist Extension    Wrist Flexion     Interossei         R            5                 5               5                     5                              5                        5                 5  L             5                 5               5                     5                              5                        5                 5              Hip Flexion    Hip Extension    Knee Flexion    Knee Extension    Dorsiflexion    Plantar Flexion  R              5                           5                       5                           5                            5                          5  L              5                           5                        5                           5                            5                          5    Sensation - Light touch/temperature OR pain/vibration intact throughout    DTR's -             Biceps      Triceps     Brachioradialis      Patellar    Ankle    Toes/plantar response  R             2+             2+                  2+                       2+            2+                 Down  L              2+             2+                 2+                        2+           2+                 Down    Coordination - Finger to Nose intact b/l. No tremors appreciated    Gait and station - Normal casual gait. Romberg (-)    Labs:                        14.2   7.15  )-----------( 167      ( 28 Nov 2022 05:23 )             42.4     11-28    138  |  104  |  11  ----------------------------<  106<H>  3.3<L>   |  23  |  0.74    Ca    9.5      28 Nov 2022 05:23  Phos  3.7     11-28  Mg     2.2     11-28    TPro  6.6  /  Alb  3.6  /  TBili  1.0  /  DBili  x   /  AST  9<L>  /  ALT  7<L>  /  AlkPhos  76  11-28    CAPILLARY BLOOD GLUCOSE      POCT Blood Glucose.: 117 mg/dL (27 Nov 2022 19:48)    LIVER FUNCTIONS - ( 28 Nov 2022 05:23 )  Alb: 3.6 g/dL / Pro: 6.6 g/dL / ALK PHOS: 76 U/L / ALT: 7 U/L / AST: 9 U/L / GGT: x             Culture - Urine (collected 27 Nov 2022 22:21)  Source: Clean Catch Clean Catch (Midstream)  Final Report (29 Nov 2022 07:03):    <10,000 CFU/mL Normal Urogenital Kaycee      PT/INR - ( 27 Nov 2022 19:53 )   PT: 12.3 sec;   INR: 1.06 ratio         PTT - ( 27 Nov 2022 19:53 )  PTT:28.1 sec  CSF:                  Radiology:  MR Head w/wo IV Cont:  (28 Nov 2022 09:11)    < from: MR Head w/wo IV Cont (11.28.22 @ 09:11) >  IMPRESSION:    1. Small basal ganglia and thalamic remote infarctions    2. Ischemic white matter disease and atrophy lower range typical for age    3. Scattered remote petechial hemorrhages    4. Left MCA aneurysm, see separate CT angiography report    5. Anterior temporal lobe structures intact.  No abnormal enhancement    < end of copied text >   Neurology - Progress Note    -    -    87 right handed female with history of colitis and TIA (MRI 09/2022 negative) admitted for Multiple episodes of staring spells. No acute events over night.      Review of Systems:    Per HPI    Allergies:      PMHx/PSHx/Family Hx: As above, otherwise see below   Arthritis    Asthma    Malignant neoplasm of unspecified site of right female breast        Social Hx:  No current use of tobacco, alcohol, or illicit drugs    Medications:  MEDICATIONS  (STANDING):  enoxaparin Injectable 40 milliGRAM(s) SubCutaneous every 24 hours  influenza  Vaccine (HIGH DOSE) 0.7 milliLiter(s) IntraMuscular once  melatonin 5 milliGRAM(s) Oral at bedtime    MEDICATIONS  (PRN):  brivaracetam  Injectable 100 milliGRAM(s) IV Push once PRN seizure  LORazepam   Injectable 1 milliGRAM(s) IV Push once PRN seizure      Vitals:  T(C): 36.8 (11-29-22 @ 05:22), Max: 36.9 (11-29-22 @ 00:31)  HR: 74 (11-29-22 @ 05:22) (72 - 83)  BP: 128/73 (11-29-22 @ 05:22) (111/67 - 128/73)  RR: 18 (11-29-22 @ 05:22) (16 - 18)  SpO2: 94% (11-29-22 @ 05:22) (94% - 95%)    Physical Examination: INCOMPLETE  General - NAD  Cardiovascular - Peripheral pulses palpable, no edema  Eyes - Fundoscopy with flat, sharp optic discs and no hemorrhage or exudates; Fundoscopy not well visualized; Fundoscopy not performed due to safety precautions in the setting of the COVID-19 pandemic    Neurologic Exam:  Mental status - Awake, Alert, Oriented to person, place, and time. Speech fluent, repetition and naming intact. Follows simple and complex commands. Attention/concentration, recent and remote memory (including registration and recall), and fund of knowledge intact    Cranial nerves - PERRLA, VFF, EOMI, face sensation (V1-V3) intact b/l, facial strength intact without asymmetry b/l, hearing intact b/l, palate with symmetric elevation, trapezius OR sternocleidomastiod 5/5 strength b/l, tongue midline on protrusion with full lateral movement    Motor - Normal bulk and tone throughout. No pronator drift.  Strength testing            Deltoid      Biceps      Triceps     Wrist Extension    Wrist Flexion     Interossei         R            5                 5               5                     5                              5                        5                 5  L             5                 5               5                     5                              5                        5                 5              Hip Flexion    Hip Extension    Knee Flexion    Knee Extension    Dorsiflexion    Plantar Flexion  R              5                           5                       5                           5                            5                          5  L              5                           5                        5                           5                            5                          5    Sensation - Light touch/temperature OR pain/vibration intact throughout    DTR's -             Biceps      Triceps     Brachioradialis      Patellar    Ankle    Toes/plantar response  R             2+             2+                  2+                       2+            2+                 Down  L              2+             2+                 2+                        2+           2+                 Down    Coordination - Finger to Nose intact b/l. No tremors appreciated    Gait and station - Normal casual gait. Romberg (-)    Labs:                        14.2   7.15  )-----------( 167      ( 28 Nov 2022 05:23 )             42.4     11-28    138  |  104  |  11  ----------------------------<  106<H>  3.3<L>   |  23  |  0.74    Ca    9.5      28 Nov 2022 05:23  Phos  3.7     11-28  Mg     2.2     11-28    TPro  6.6  /  Alb  3.6  /  TBili  1.0  /  DBili  x   /  AST  9<L>  /  ALT  7<L>  /  AlkPhos  76  11-28    CAPILLARY BLOOD GLUCOSE      POCT Blood Glucose.: 117 mg/dL (27 Nov 2022 19:48)    LIVER FUNCTIONS - ( 28 Nov 2022 05:23 )  Alb: 3.6 g/dL / Pro: 6.6 g/dL / ALK PHOS: 76 U/L / ALT: 7 U/L / AST: 9 U/L / GGT: x             Culture - Urine (collected 27 Nov 2022 22:21)  Source: Clean Catch Clean Catch (Midstream)  Final Report (29 Nov 2022 07:03):    <10,000 CFU/mL Normal Urogenital Kaycee      PT/INR - ( 27 Nov 2022 19:53 )   PT: 12.3 sec;   INR: 1.06 ratio         PTT - ( 27 Nov 2022 19:53 )  PTT:28.1 sec  CSF:                  Radiology:  MR Head w/wo IV Cont:  (28 Nov 2022 09:11)    < from: MR Head w/wo IV Cont (11.28.22 @ 09:11) >  IMPRESSION:    1. Small basal ganglia and thalamic remote infarctions    2. Ischemic white matter disease and atrophy lower range typical for age    3. Scattered remote petechial hemorrhages    4. Left MCA aneurysm, see separate CT angiography report    5. Anterior temporal lobe structures intact.  No abnormal enhancement    < end of copied text >   Neurology - Progress Note    -    -    87 right handed female with history of colitis and TIA (MRI 09/2022 negative) admitted for Multiple episodes of staring spells. No acute events over night.      Review of Systems:    Per HPI    Allergies:      PMHx/PSHx/Family Hx: As above, otherwise see below   Arthritis    Asthma    Malignant neoplasm of unspecified site of right female breast        Social Hx:  No current use of tobacco, alcohol, or illicit drugs    Medications:  MEDICATIONS  (STANDING):  enoxaparin Injectable 40 milliGRAM(s) SubCutaneous every 24 hours  influenza  Vaccine (HIGH DOSE) 0.7 milliLiter(s) IntraMuscular once  melatonin 5 milliGRAM(s) Oral at bedtime    MEDICATIONS  (PRN):  brivaracetam  Injectable 100 milliGRAM(s) IV Push once PRN seizure  LORazepam   Injectable 1 milliGRAM(s) IV Push once PRN seizure      Vitals:  T(C): 36.8 (11-29-22 @ 05:22), Max: 36.9 (11-29-22 @ 00:31)  HR: 74 (11-29-22 @ 05:22) (72 - 83)  BP: 128/73 (11-29-22 @ 05:22) (111/67 - 128/73)  RR: 18 (11-29-22 @ 05:22) (16 - 18)  SpO2: 94% (11-29-22 @ 05:22) (94% - 95%)    Physical Examination:   General - NAD    Neurologic Exam:  Mental status - Awake, Alert, Oriented to person, place, and time. Speech fluent, clear. Follows simple and complex commands.     Cranial nerves - PERRL, VFF, EOMI, face sensation (V1-V3) intact b/l, facial strength intact without asymmetry b/l, hearing intact b/l, palate with symmetric elevation, sternocleidomastiod 5/5 strength b/l, tongue midline on protrusion with full lateral movement    Motor -   Strength testing            Deltoid      Biceps      Triceps     Wrist Extension    Wrist Flexion                          R            5                 5               5                     5                              5                        5                   L             5                 5               5                     5                              5                        5                               Hip Flexion    Hip Extension    Knee Flexion    Knee Extension                     Dorsiflexion    Plantar Flexion  R              5                           5                       5                           5                            5                          5  L              5                           5                        5                           5                            5                          5    Sensation - Light touch intact throughout    Gait and station - Deffered as patient on EEG    Labs:                        14.2   7.15  )-----------( 167      ( 28 Nov 2022 05:23 )             42.4     11-28    138  |  104  |  11  ----------------------------<  106<H>  3.3<L>   |  23  |  0.74    Ca    9.5      28 Nov 2022 05:23  Phos  3.7     11-28  Mg     2.2     11-28    TPro  6.6  /  Alb  3.6  /  TBili  1.0  /  DBili  x   /  AST  9<L>  /  ALT  7<L>  /  AlkPhos  76  11-28    CAPILLARY BLOOD GLUCOSE      POCT Blood Glucose.: 117 mg/dL (27 Nov 2022 19:48)    LIVER FUNCTIONS - ( 28 Nov 2022 05:23 )  Alb: 3.6 g/dL / Pro: 6.6 g/dL / ALK PHOS: 76 U/L / ALT: 7 U/L / AST: 9 U/L / GGT: x             Culture - Urine (collected 27 Nov 2022 22:21)  Source: Clean Catch Clean Catch (Midstream)  Final Report (29 Nov 2022 07:03):    <10,000 CFU/mL Normal Urogenital Kaycee      PT/INR - ( 27 Nov 2022 19:53 )   PT: 12.3 sec;   INR: 1.06 ratio         PTT - ( 27 Nov 2022 19:53 )  PTT:28.1 sec          Radiology:  MR Head w/wo IV Cont:  (28 Nov 2022 09:11)    < from: MR Head w/wo IV Cont (11.28.22 @ 09:11) >  IMPRESSION:    1. Small basal ganglia and thalamic remote infarctions    2. Ischemic white matter disease and atrophy lower range typical for age    3. Scattered remote petechial hemorrhages    4. Left MCA aneurysm, see separate CT angiography report    5. Anterior temporal lobe structures intact.  No abnormal enhancement    eThis is a normal EEG record.     Diffuse beta may be seen in the setting of sedative medication use  No epileptiform pattern or seizures were seen.   3

## 2025-01-14 ENCOUNTER — TRANSCRIPTION ENCOUNTER (OUTPATIENT)
Age: 89
End: 2025-01-14

## 2025-01-14 ENCOUNTER — OUTPATIENT (OUTPATIENT)
Dept: OUTPATIENT SERVICES | Facility: HOSPITAL | Age: 89
LOS: 1 days | End: 2025-01-14
Payer: MEDICARE

## 2025-01-14 VITALS
DIASTOLIC BLOOD PRESSURE: 66 MMHG | HEART RATE: 95 BPM | SYSTOLIC BLOOD PRESSURE: 122 MMHG | TEMPERATURE: 98 F | OXYGEN SATURATION: 95 % | RESPIRATION RATE: 18 BRPM

## 2025-01-14 DIAGNOSIS — Z90.710 ACQUIRED ABSENCE OF BOTH CERVIX AND UTERUS: Chronic | ICD-10-CM

## 2025-01-14 DIAGNOSIS — Z98.890 OTHER SPECIFIED POSTPROCEDURAL STATES: Chronic | ICD-10-CM

## 2025-01-14 DIAGNOSIS — R13.10 DYSPHAGIA, UNSPECIFIED: ICD-10-CM

## 2025-01-14 DIAGNOSIS — Z00.00 ENCOUNTER FOR GENERAL ADULT MEDICAL EXAMINATION WITHOUT ABNORMAL FINDINGS: ICD-10-CM

## 2025-01-14 PROCEDURE — L8699: CPT

## 2025-01-14 PROCEDURE — C1887: CPT

## 2025-01-14 PROCEDURE — C1769: CPT

## 2025-01-14 PROCEDURE — 49450 REPLACE G/C TUBE PERC: CPT

## 2025-01-14 RX ORDER — ACETAMINOPHEN 80 MG/.8ML
2 SOLUTION/ DROPS ORAL
Refills: 0 | DISCHARGE

## 2025-01-14 RX ORDER — INSULIN GLARGINE-YFGN 100 [IU]/ML
0 INJECTION, SOLUTION SUBCUTANEOUS
Refills: 0 | DISCHARGE

## 2025-01-14 RX ORDER — FAMOTIDINE 20 MG/1
1 TABLET, FILM COATED ORAL
Refills: 0 | DISCHARGE

## 2025-01-14 RX ORDER — ALBUTEROL SULFATE 90 UG/1
3 INHALANT RESPIRATORY (INHALATION)
Refills: 0 | DISCHARGE

## 2025-01-14 RX ORDER — LOSARTAN POTASSIUM 100 MG/1
1 TABLET, FILM COATED ORAL
Refills: 0 | DISCHARGE

## 2025-01-14 RX ORDER — METOPROLOL TARTRATE 50 MG
1 TABLET ORAL
Refills: 0 | DISCHARGE

## 2025-01-14 RX ORDER — ATORVASTATIN CALCIUM 40 MG/1
1 TABLET, FILM COATED ORAL
Refills: 0 | DISCHARGE

## 2025-01-14 RX ORDER — FUROSEMIDE 20 MG
1 TABLET ORAL
Refills: 0 | DISCHARGE

## 2025-01-14 RX ORDER — INSULIN LISPRO 100/ML
200 VIAL (ML) SUBCUTANEOUS
Refills: 0 | DISCHARGE

## 2025-01-14 RX ORDER — LEVOTHYROXINE SODIUM 175 UG/1
1 TABLET ORAL
Refills: 0 | DISCHARGE

## 2025-01-14 RX ORDER — BISACODYL 5 MG
1 TABLET, DELAYED RELEASE (ENTERIC COATED) ORAL
Refills: 0 | DISCHARGE

## 2025-01-14 RX ORDER — HEPARIN SODIUM 1000 [USP'U]/ML
5000 INJECTION, SOLUTION INTRAVENOUS; SUBCUTANEOUS
Refills: 0 | DISCHARGE

## 2025-01-14 NOTE — ASU DISCHARGE PLAN (ADULT/PEDIATRIC) - NURSING INSTRUCTIONS
Please feel free to contact us at [phone] if any problems arise. After 6PM, Monday through Friday, on weekends and on holidays, please call [phone] and ask for the radiology resident on call to be paged.

## 2025-01-14 NOTE — ASU DISCHARGE PLAN (ADULT/PEDIATRIC) - FINANCIAL ASSISTANCE
[facility] provides services at a reduced cost to those who are determined to be eligible through [facility]’s financial assistance program. Information regarding [facility]’s financial assistance program can be found by going to [email or web address] or by calling [phone].

## 2025-01-14 NOTE — H&P ADULT - HISTORY OF PRESENT ILLNESS
Interventional Radiology    HPI: 89y Female with gastrostomy tube recently inadvertently dislodged now presents to IR for gastrostomy tube replacement.     Review of Systems:   ROS negative x10 systems unless otherwise stated in HPI     Allergies: No Known Allergies    Medications (Abx/Cardiac/Anticoagulation/Blood Products)  Reviewed     Data:  T(C): 36.7  HR: 95  BP: 122/66  RR: 18  SpO2: 95%    Physical Exam  General: No acute distress, nontoxic, A&Ox3  Chest: Non labored breathing    RADIOLOGY & ADDITIONAL TESTS:    Imaging Reviewed    Plan: 89y Female presents for gastrostomy tube reinsertion  -Risks/Benefits/alternatives explained with the patient and/or healthcare proxy and witnessed informed consent obtained.

## 2025-01-14 NOTE — ASU DISCHARGE PLAN (ADULT/PEDIATRIC) - NS MD DC FALL RISK RISK
For information on Fall & Injury Prevention, visit: [email or web address] OR  [email or web address] OR  https://www.cdc.gov/steadi/patient.html

## 2025-01-14 NOTE — ASU DISCHARGE PLAN (ADULT/PEDIATRIC) - ASU DC SPECIAL INSTRUCTIONSFT
GTube Exchange  Discharge Instructions  - You have had a GTube reinsertion  - Keep the area clean and dry.  - Do not soak in a tub or pool with the GTube, however you may shower with the GTube and dressing covered in plastic wrap.  - Do not put traction on the GTube and be careful that the GTube does not get accidentally dislodged.  - You may resume your normal diet.  - You may resume your normal medications. Flush GTube aggressively with saline to prevent clogging of the Gtube.  - It is normal to experience some pain over the site for the next few days. You may take apply ice to the area (20 minutes on, 20 minutes off) and take Tylenol for that pain. Do not take more frequently than every 6 hours and do not exceed more than 3000mg of Tylenol in a 24 hour period.    Notify your primary physician and/or Interventional Radiology IMMEDIATELY if you experience any of the following       - Fever of 101F or 38C       - Chills or Rigors/ Shakes       - Swelling and/or Redness in the area of the puncture site       - Worsening Pain       - Blood soaked bandages or worsening bleeding       - Lightheadedness and/or dizziness upon standing       - Chest Pain/ Tightness       - Shortness of Breath       - Difficulty walking    If you have a problem that you believe requires IMMEDIATE attention, please go to your NEAREST Emergency Room. If you believe your problem can safely wait until you speak to a physician, please call Interventional Radiology for any concerns.    During Normal Weekday Business Hours- You can contact the Interventional Radiology department during normal business hours via telephone.  During Evenings and Weekends- If you need to contact Interventional Radiology during off hours, do so by calling the hospital and requesting to be connected to the Interventional Radiologist on call.

## 2025-01-21 ENCOUNTER — OUTPATIENT (OUTPATIENT)
Dept: OUTPATIENT SERVICES | Facility: HOSPITAL | Age: 89
LOS: 1 days | End: 2025-01-21
Payer: MEDICARE

## 2025-01-21 ENCOUNTER — NON-APPOINTMENT (OUTPATIENT)
Age: 89
End: 2025-01-21

## 2025-01-21 DIAGNOSIS — Z98.890 OTHER SPECIFIED POSTPROCEDURAL STATES: Chronic | ICD-10-CM

## 2025-01-21 DIAGNOSIS — Z90.710 ACQUIRED ABSENCE OF BOTH CERVIX AND UTERUS: Chronic | ICD-10-CM

## 2025-01-21 DIAGNOSIS — R13.10 DYSPHAGIA, UNSPECIFIED: ICD-10-CM

## 2025-01-21 PROCEDURE — 74230 X-RAY XM SWLNG FUNCJ C+: CPT

## 2025-01-21 PROCEDURE — 74230 X-RAY XM SWLNG FUNCJ C+: CPT | Mod: 26

## 2025-01-21 PROCEDURE — 92611 MOTION FLUOROSCOPY/SWALLOW: CPT

## 2025-01-28 DIAGNOSIS — R13.10 DYSPHAGIA, UNSPECIFIED: ICD-10-CM

## 2025-02-11 ENCOUNTER — INPATIENT (INPATIENT)
Facility: HOSPITAL | Age: 89
LOS: 1 days | Discharge: NURSING, REHAB OR LONG-TERM CARE | DRG: 563 | End: 2025-02-13
Attending: HOSPITALIST | Admitting: STUDENT IN AN ORGANIZED HEALTH CARE EDUCATION/TRAINING PROGRAM
Payer: MEDICARE

## 2025-02-11 VITALS
RESPIRATION RATE: 20 BRPM | OXYGEN SATURATION: 97 % | DIASTOLIC BLOOD PRESSURE: 82 MMHG | SYSTOLIC BLOOD PRESSURE: 133 MMHG | WEIGHT: 119.93 LBS | HEIGHT: 62 IN | HEART RATE: 79 BPM | TEMPERATURE: 98 F

## 2025-02-11 DIAGNOSIS — Z98.890 OTHER SPECIFIED POSTPROCEDURAL STATES: Chronic | ICD-10-CM

## 2025-02-11 DIAGNOSIS — Z90.710 ACQUIRED ABSENCE OF BOTH CERVIX AND UTERUS: Chronic | ICD-10-CM

## 2025-02-11 LAB
ALBUMIN SERPL ELPH-MCNC: 3.6 G/DL — SIGNIFICANT CHANGE UP (ref 3.3–5)
ALP SERPL-CCNC: 119 U/L — SIGNIFICANT CHANGE UP (ref 40–120)
ALT FLD-CCNC: 19 U/L — SIGNIFICANT CHANGE UP (ref 10–45)
ANION GAP SERPL CALC-SCNC: 12 MMOL/L — SIGNIFICANT CHANGE UP (ref 5–17)
APTT BLD: 28.4 SEC — SIGNIFICANT CHANGE UP (ref 24.5–35.6)
AST SERPL-CCNC: 17 U/L — SIGNIFICANT CHANGE UP (ref 10–40)
BASOPHILS # BLD AUTO: 0.07 K/UL — SIGNIFICANT CHANGE UP (ref 0–0.2)
BASOPHILS NFR BLD AUTO: 0.7 % — SIGNIFICANT CHANGE UP (ref 0–2)
BILIRUB SERPL-MCNC: 0.6 MG/DL — SIGNIFICANT CHANGE UP (ref 0.2–1.2)
BLD GP AB SCN SERPL QL: NEGATIVE — SIGNIFICANT CHANGE UP
BUN SERPL-MCNC: 21 MG/DL — SIGNIFICANT CHANGE UP (ref 7–23)
CALCIUM SERPL-MCNC: 10.1 MG/DL — SIGNIFICANT CHANGE UP (ref 8.4–10.5)
CHLORIDE SERPL-SCNC: 102 MMOL/L — SIGNIFICANT CHANGE UP (ref 96–108)
CO2 SERPL-SCNC: 24 MMOL/L — SIGNIFICANT CHANGE UP (ref 22–31)
CREAT SERPL-MCNC: 0.54 MG/DL — SIGNIFICANT CHANGE UP (ref 0.5–1.3)
EGFR: 87 ML/MIN/1.73M2 — SIGNIFICANT CHANGE UP
EOSINOPHIL # BLD AUTO: 0.16 K/UL — SIGNIFICANT CHANGE UP (ref 0–0.5)
EOSINOPHIL NFR BLD AUTO: 1.5 % — SIGNIFICANT CHANGE UP (ref 0–6)
GAS PNL BLDV: SIGNIFICANT CHANGE UP
GLUCOSE SERPL-MCNC: 144 MG/DL — HIGH (ref 70–99)
HCT VFR BLD CALC: 41.6 % — SIGNIFICANT CHANGE UP (ref 34.5–45)
HGB BLD-MCNC: 13.7 G/DL — SIGNIFICANT CHANGE UP (ref 11.5–15.5)
IMM GRANULOCYTES NFR BLD AUTO: 0.3 % — SIGNIFICANT CHANGE UP (ref 0–0.9)
INR BLD: 1.07 RATIO — SIGNIFICANT CHANGE UP (ref 0.85–1.16)
LYMPHOCYTES # BLD AUTO: 1.48 K/UL — SIGNIFICANT CHANGE UP (ref 1–3.3)
LYMPHOCYTES # BLD AUTO: 13.9 % — SIGNIFICANT CHANGE UP (ref 13–44)
MAGNESIUM SERPL-MCNC: 2.1 MG/DL — SIGNIFICANT CHANGE UP (ref 1.6–2.6)
MCHC RBC-ENTMCNC: 29.9 PG — SIGNIFICANT CHANGE UP (ref 27–34)
MCHC RBC-ENTMCNC: 32.9 G/DL — SIGNIFICANT CHANGE UP (ref 32–36)
MCV RBC AUTO: 90.8 FL — SIGNIFICANT CHANGE UP (ref 80–100)
MONOCYTES # BLD AUTO: 0.74 K/UL — SIGNIFICANT CHANGE UP (ref 0–0.9)
MONOCYTES NFR BLD AUTO: 7 % — SIGNIFICANT CHANGE UP (ref 2–14)
NEUTROPHILS # BLD AUTO: 8.16 K/UL — HIGH (ref 1.8–7.4)
NEUTROPHILS NFR BLD AUTO: 76.6 % — SIGNIFICANT CHANGE UP (ref 43–77)
NRBC BLD AUTO-RTO: 0 /100 WBCS — SIGNIFICANT CHANGE UP (ref 0–0)
PLATELET # BLD AUTO: 242 K/UL — SIGNIFICANT CHANGE UP (ref 150–400)
POTASSIUM SERPL-MCNC: 3.6 MMOL/L — SIGNIFICANT CHANGE UP (ref 3.5–5.3)
POTASSIUM SERPL-SCNC: 3.6 MMOL/L — SIGNIFICANT CHANGE UP (ref 3.5–5.3)
PROT SERPL-MCNC: 7.4 G/DL — SIGNIFICANT CHANGE UP (ref 6–8.3)
PROTHROM AB SERPL-ACNC: 12.3 SEC — SIGNIFICANT CHANGE UP (ref 9.9–13.4)
RBC # BLD: 4.58 M/UL — SIGNIFICANT CHANGE UP (ref 3.8–5.2)
RBC # FLD: 15.3 % — HIGH (ref 10.3–14.5)
RH IG SCN BLD-IMP: POSITIVE — SIGNIFICANT CHANGE UP
SODIUM SERPL-SCNC: 138 MMOL/L — SIGNIFICANT CHANGE UP (ref 135–145)
TROPONIN T, HIGH SENSITIVITY RESULT: 33 NG/L — SIGNIFICANT CHANGE UP (ref 0–51)
WBC # BLD: 10.64 K/UL — HIGH (ref 3.8–10.5)
WBC # FLD AUTO: 10.64 K/UL — HIGH (ref 3.8–10.5)

## 2025-02-11 PROCEDURE — 71045 X-RAY EXAM CHEST 1 VIEW: CPT | Mod: 26

## 2025-02-11 PROCEDURE — 73610 X-RAY EXAM OF ANKLE: CPT | Mod: 26,50

## 2025-02-11 PROCEDURE — 73630 X-RAY EXAM OF FOOT: CPT | Mod: 26,50

## 2025-02-11 PROCEDURE — 99285 EMERGENCY DEPT VISIT HI MDM: CPT | Mod: GC

## 2025-02-11 PROCEDURE — 73590 X-RAY EXAM OF LOWER LEG: CPT | Mod: 26,50

## 2025-02-11 PROCEDURE — 73562 X-RAY EXAM OF KNEE 3: CPT | Mod: 26,RT

## 2025-02-11 PROCEDURE — 72170 X-RAY EXAM OF PELVIS: CPT | Mod: 26

## 2025-02-11 NOTE — ED PROVIDER NOTE - CLINICAL SUMMARY MEDICAL DECISION MAKING FREE TEXT BOX
89-year-old female with a history of recent CVA with right-sided deficits, colitis, PEG tube dependent, dementia presenting from [facility] acute rehab with some after being found down by her bed at 5:30 PM today. Collateral information obtained by son.  Unknown LOC or head strike.  Patient on heparin following recent CVA.  Patient endorsing pain in her right leg, right ankle, right foot and toes of left foot.  Per son, patient is mentating at her baseline.  Slurred speech is baseline since stroke.  Patient is hemodynamically stable, afebrile.  Exam notable for significant ecchymosis and edema of right ankle with tenderness to right ankle, right foot, and right tibia/fib as well as tenderness to toes of left foot.  Will obtain basic labs, coags, troponin, EKG CT head, and x-rays of traumatic areas.

## 2025-02-11 NOTE — ED ADULT NURSE NOTE - NSFALLHARMRISKINTERV_ED_ALL_ED

## 2025-02-11 NOTE — ED PROVIDER NOTE - CARE PLAN
1 Principal Discharge DX:	Acute right ankle pain   Principal Discharge DX:	Closed right trimalleolar fracture

## 2025-02-11 NOTE — ED ADULT NURSE NOTE - OBJECTIVE STATEMENT
89YOF hx asthma/Breast Ca/CVA R side weak/PEG Tube/Dementia BIBEMS from Acute Rehab c/o fall. Son translating for pt at bedside. Per EMS pt had unwitnessed fall in room around 5PM today, found by nurse on floor next to bed and assisted back onto bed, pt denied HS and LOC to facility staff, found to have swollen and bruised RLE from knee to foot, unable to bear weight to RLE, tender to palpation, is on Heparin subcutaneous for DVT prophylaxis. AOx3 (baseline) with slurred speech (baseline s/p stroke), no abrasions/lacerations noted to head. VSS. 89YOF hx asthma/Breast Ca/CVA R side weak/PEG Tube/Dementia BIBEMS from Acute Rehab c/o fall. Son translating for pt at bedside. Per EMS pt had unwitnessed fall in room around 5PM today, found by nurse on floor next to bed after presumably falling off side, pt denied HS and LOC to facility staff, found to have swollen and bruised RLE from knee to foot, unable to bear weight to RLE, tender to palpation, is on Heparin subcutaneous for DVT prophylaxis. AOx3 (baseline) with slurred speech (baseline s/p stroke), no abrasions/lacerations noted to head. VSS.

## 2025-02-11 NOTE — ED PROVIDER NOTE - PROGRESS NOTE DETAILS
ortho recommending admission for surgical fixation. Called son and informed of plan and he is in agreement. He will return to hospital now. will page for admission to medicine. Trauma imaging notable for trimalleolar fracture of right ankle.  CBC and CMP are unremarkable.  RVP negative.  CT head and neck negative for fracture or traumatic injury. awaiting ortho recs at 5am for OR vs outpatient f/u. ankle reduced at bedside and splinted.

## 2025-02-11 NOTE — ED PROVIDER NOTE - PHYSICAL EXAMINATION
Vital signs: Reviewed.   General: In no acute distress  Head: Atraumatic, normocephalic  Eyes: PEARLA; Normal eyelids, conjunctiva, and sclera; no discharge  Neck: Normal appearing; Trachea midline. No c/d/l spine TTP  Cardiovascular: Regular rate and rhythm, no murmurs rubs or gallops were appreciated. No JVD. b/l LE edema, 3+  Lungs: Normal rate, rhythm and depth of respirations; no accessory muscle use; no wheezes, rales, or rhonchi, and no evidence of airway compromise  Abdomen: Soft, nondistended, nontender x4 quadrants. PEG tube in place  Neuro: Residual R sided functional deficits following stroke, unchanged per son  MSK: TTP of toes of R foot, ankle and tib/fib as well as L toes and ankle  Derm: w/d/i  Psych: mood and affect appropriate and congruent

## 2025-02-11 NOTE — ED PROVIDER NOTE - OBJECTIVE STATEMENT
89-year-old female with a history of recent CVA with right-sided deficits, colitis, PEG tube dependent, dementia presenting from [facility] acute rehab with some after being found down by her bed at 5:30 PM today.  Medical staff at the facility came to check on her shortly after the fall and helped her the bed and then transported her to the hospital for evaluation.  She is currently on heparin.  Unknown LOC or head strike.  Patient endorsing pain in her right ankle and toes of left foot.  Denies headache, blurry vision, abdominal pain, chest pain, shortness of breath, hip pain, fever, night sweats, chills

## 2025-02-11 NOTE — ED ADULT TRIAGE NOTE - BSA (M2)
Cole Petersen is a 12 year old male presenting with Mother for   Chief Complaint   Patient presents with   • Office Visit   • Follow-up     Growth Concerns        Concerns: Patient in for growth concerns, possibly labs to confirm.        Denies known Latex allergy or symptoms of Latex sensitivity.  Medications reviewed and updated.  Social History     Tobacco Use   Smoking Status Never   Smokeless Tobacco Never   Tobacco Comments     No smokers in the household     Health Maintenance Due   Topic Date Due   • COVID-19 Vaccine (1) Never done   • Meningococcal Vaccine (1 - 2-dose series) Never done   • DTaP/Tdap/Td Vaccine (6 - Tdap) 10/27/2021   • HPV Vaccine (1 - Male 2-dose series) Never done   • Depression Screening  Never done               1.54

## 2025-02-12 ENCOUNTER — TRANSCRIPTION ENCOUNTER (OUTPATIENT)
Age: 89
End: 2025-02-12

## 2025-02-12 DIAGNOSIS — R29.6 REPEATED FALLS: ICD-10-CM

## 2025-02-12 DIAGNOSIS — I63.9 CEREBRAL INFARCTION, UNSPECIFIED: ICD-10-CM

## 2025-02-12 DIAGNOSIS — E03.9 HYPOTHYROIDISM, UNSPECIFIED: ICD-10-CM

## 2025-02-12 DIAGNOSIS — I10 ESSENTIAL (PRIMARY) HYPERTENSION: ICD-10-CM

## 2025-02-12 DIAGNOSIS — E11.9 TYPE 2 DIABETES MELLITUS WITHOUT COMPLICATIONS: ICD-10-CM

## 2025-02-12 DIAGNOSIS — Z29.9 ENCOUNTER FOR PROPHYLACTIC MEASURES, UNSPECIFIED: ICD-10-CM

## 2025-02-12 DIAGNOSIS — R13.10 DYSPHAGIA, UNSPECIFIED: ICD-10-CM

## 2025-02-12 DIAGNOSIS — S82.851A DISPLACED TRIMALLEOLAR FRACTURE OF RIGHT LOWER LEG, INITIAL ENCOUNTER FOR CLOSED FRACTURE: ICD-10-CM

## 2025-02-12 LAB
ANION GAP SERPL CALC-SCNC: 12 MMOL/L — SIGNIFICANT CHANGE UP (ref 5–17)
APTT BLD: 28.9 SEC — SIGNIFICANT CHANGE UP (ref 24.5–35.6)
BLD GP AB SCN SERPL QL: NEGATIVE — SIGNIFICANT CHANGE UP
BUN SERPL-MCNC: 20 MG/DL — SIGNIFICANT CHANGE UP (ref 7–23)
CALCIUM SERPL-MCNC: 9.8 MG/DL — SIGNIFICANT CHANGE UP (ref 8.4–10.5)
CHLORIDE SERPL-SCNC: 103 MMOL/L — SIGNIFICANT CHANGE UP (ref 96–108)
CO2 SERPL-SCNC: 23 MMOL/L — SIGNIFICANT CHANGE UP (ref 22–31)
CREAT SERPL-MCNC: 0.52 MG/DL — SIGNIFICANT CHANGE UP (ref 0.5–1.3)
EGFR: 88 ML/MIN/1.73M2 — SIGNIFICANT CHANGE UP
FLUAV AG NPH QL: SIGNIFICANT CHANGE UP
FLUBV AG NPH QL: SIGNIFICANT CHANGE UP
GLUCOSE BLDC GLUCOMTR-MCNC: 137 MG/DL — HIGH (ref 70–99)
GLUCOSE BLDC GLUCOMTR-MCNC: 156 MG/DL — HIGH (ref 70–99)
GLUCOSE BLDC GLUCOMTR-MCNC: 158 MG/DL — HIGH (ref 70–99)
GLUCOSE SERPL-MCNC: 127 MG/DL — HIGH (ref 70–99)
HCT VFR BLD CALC: 38.4 % — SIGNIFICANT CHANGE UP (ref 34.5–45)
HGB BLD-MCNC: 12.4 G/DL — SIGNIFICANT CHANGE UP (ref 11.5–15.5)
INR BLD: 1.11 RATIO — SIGNIFICANT CHANGE UP (ref 0.85–1.16)
MCHC RBC-ENTMCNC: 28.9 PG — SIGNIFICANT CHANGE UP (ref 27–34)
MCHC RBC-ENTMCNC: 32.3 G/DL — SIGNIFICANT CHANGE UP (ref 32–36)
MCV RBC AUTO: 89.5 FL — SIGNIFICANT CHANGE UP (ref 80–100)
NRBC BLD AUTO-RTO: 0 /100 WBCS — SIGNIFICANT CHANGE UP (ref 0–0)
PLATELET # BLD AUTO: 194 K/UL — SIGNIFICANT CHANGE UP (ref 150–400)
POTASSIUM SERPL-MCNC: 3.8 MMOL/L — SIGNIFICANT CHANGE UP (ref 3.5–5.3)
POTASSIUM SERPL-SCNC: 3.8 MMOL/L — SIGNIFICANT CHANGE UP (ref 3.5–5.3)
PROTHROM AB SERPL-ACNC: 12.7 SEC — SIGNIFICANT CHANGE UP (ref 9.9–13.4)
RBC # BLD: 4.29 M/UL — SIGNIFICANT CHANGE UP (ref 3.8–5.2)
RBC # FLD: 15.2 % — HIGH (ref 10.3–14.5)
RH IG SCN BLD-IMP: POSITIVE — SIGNIFICANT CHANGE UP
RSV RNA NPH QL NAA+NON-PROBE: SIGNIFICANT CHANGE UP
SARS-COV-2 RNA SPEC QL NAA+PROBE: SIGNIFICANT CHANGE UP
SODIUM SERPL-SCNC: 138 MMOL/L — SIGNIFICANT CHANGE UP (ref 135–145)
TROPONIN T, HIGH SENSITIVITY RESULT: 29 NG/L — SIGNIFICANT CHANGE UP (ref 0–51)
WBC # BLD: 11.54 K/UL — HIGH (ref 3.8–10.5)
WBC # FLD AUTO: 11.54 K/UL — HIGH (ref 3.8–10.5)

## 2025-02-12 PROCEDURE — 27823 TREATMENT OF ANKLE FRACTURE: CPT | Mod: RT

## 2025-02-12 PROCEDURE — 99239 HOSP IP/OBS DSCHRG MGMT >30: CPT

## 2025-02-12 PROCEDURE — 73590 X-RAY EXAM OF LOWER LEG: CPT | Mod: 26,RT

## 2025-02-12 PROCEDURE — 73610 X-RAY EXAM OF ANKLE: CPT | Mod: 26,RT

## 2025-02-12 PROCEDURE — 73700 CT LOWER EXTREMITY W/O DYE: CPT | Mod: 26,RT

## 2025-02-12 PROCEDURE — 70450 CT HEAD/BRAIN W/O DYE: CPT | Mod: 26

## 2025-02-12 PROCEDURE — 99223 1ST HOSP IP/OBS HIGH 75: CPT

## 2025-02-12 PROCEDURE — 76377 3D RENDER W/INTRP POSTPROCES: CPT | Mod: 26

## 2025-02-12 PROCEDURE — 72125 CT NECK SPINE W/O DYE: CPT | Mod: 26

## 2025-02-12 DEVICE — IMPLANTABLE DEVICE: Type: IMPLANTABLE DEVICE | Site: RIGHT | Status: FUNCTIONAL

## 2025-02-12 DEVICE — WASHER SM SCRW 7MM: Type: IMPLANTABLE DEVICE | Site: RIGHT | Status: FUNCTIONAL

## 2025-02-12 DEVICE — GWIRE THRD SPADE POINT 1.25X150MM: Type: IMPLANTABLE DEVICE | Site: RIGHT | Status: FUNCTIONAL

## 2025-02-12 RX ORDER — LIDOCAINE HCL/EPINEPHRINE 2 %-1:100K
1 VIAL (ML) INJECTION ONCE
Refills: 0 | Status: DISCONTINUED | OUTPATIENT
Start: 2025-02-12 | End: 2025-02-13

## 2025-02-12 RX ORDER — BISACODYL 5 MG
10 TABLET, DELAYED RELEASE (ENTERIC COATED) ORAL DAILY
Refills: 0 | Status: DISCONTINUED | OUTPATIENT
Start: 2025-02-14 | End: 2025-02-13

## 2025-02-12 RX ORDER — ASCORBIC ACID 500 MG/ML
500 VIAL (ML) INJECTION
Refills: 0 | Status: DISCONTINUED | OUTPATIENT
Start: 2025-02-12 | End: 2025-02-12

## 2025-02-12 RX ORDER — INSULIN GLARGINE-YFGN 100 [IU]/ML
16 INJECTION, SOLUTION SUBCUTANEOUS AT BEDTIME
Refills: 0 | Status: DISCONTINUED | OUTPATIENT
Start: 2025-02-12 | End: 2025-02-13

## 2025-02-12 RX ORDER — POLYETHYLENE GLYCOL 3350 17 G/17G
17 POWDER, FOR SOLUTION ORAL
Refills: 0 | DISCHARGE

## 2025-02-12 RX ORDER — ATORVASTATIN CALCIUM 80 MG/1
80 TABLET, FILM COATED ORAL AT BEDTIME
Refills: 0 | Status: DISCONTINUED | OUTPATIENT
Start: 2025-02-12 | End: 2025-02-12

## 2025-02-12 RX ORDER — AMLODIPINE BESYLATE 5 MG
10 TABLET ORAL DAILY
Refills: 0 | Status: DISCONTINUED | OUTPATIENT
Start: 2025-02-12 | End: 2025-02-12

## 2025-02-12 RX ORDER — LOSARTAN POTASSIUM 100 MG
50 TABLET ORAL DAILY
Refills: 0 | Status: DISCONTINUED | OUTPATIENT
Start: 2025-02-12 | End: 2025-02-13

## 2025-02-12 RX ORDER — SODIUM CHLORIDE 9 G/ML
1000 INJECTION, SOLUTION INTRAVENOUS
Refills: 0 | Status: DISCONTINUED | OUTPATIENT
Start: 2025-02-12 | End: 2025-02-12

## 2025-02-12 RX ORDER — HYDROMORPHONE HYDROCHLORIDE 4 MG/ML
0.25 INJECTION, SOLUTION INTRAMUSCULAR; INTRAVENOUS; SUBCUTANEOUS
Refills: 0 | Status: DISCONTINUED | OUTPATIENT
Start: 2025-02-12 | End: 2025-02-12

## 2025-02-12 RX ORDER — INSULIN LISPRO 100/ML
VIAL (ML) SUBCUTANEOUS AT BEDTIME
Refills: 0 | Status: DISCONTINUED | OUTPATIENT
Start: 2025-02-12 | End: 2025-02-12

## 2025-02-12 RX ORDER — FAMOTIDINE 10 MG/ML
20 INJECTION INTRAVENOUS DAILY
Refills: 0 | Status: DISCONTINUED | OUTPATIENT
Start: 2025-02-12 | End: 2025-02-13

## 2025-02-12 RX ORDER — AMANTADINE HCL 50 MG/5 ML
100 SOLUTION, ORAL ORAL DAILY
Refills: 0 | Status: DISCONTINUED | OUTPATIENT
Start: 2025-02-12 | End: 2025-02-13

## 2025-02-12 RX ORDER — DM/PSEUDOEPHED/ACETAMINOPHEN 10-30-250
15 CAPSULE ORAL ONCE
Refills: 0 | Status: DISCONTINUED | OUTPATIENT
Start: 2025-02-12 | End: 2025-02-12

## 2025-02-12 RX ORDER — BACTERIOSTATIC SODIUM CHLORIDE 0.9 %
1000 VIAL (ML) INJECTION
Refills: 0 | Status: DISCONTINUED | OUTPATIENT
Start: 2025-02-12 | End: 2025-02-13

## 2025-02-12 RX ORDER — TRAMADOL HYDROCHLORIDE 100 MG/1
50 TABLET, EXTENDED RELEASE ORAL EVERY 6 HOURS
Refills: 0 | Status: DISCONTINUED | OUTPATIENT
Start: 2025-02-12 | End: 2025-02-13

## 2025-02-12 RX ORDER — POLYETHYLENE GLYCOL 3350 17 G/17G
17 POWDER, FOR SOLUTION ORAL DAILY
Refills: 0 | Status: DISCONTINUED | OUTPATIENT
Start: 2025-02-12 | End: 2025-02-13

## 2025-02-12 RX ORDER — ACETAMINOPHEN, DIPHENHYDRAMINE HCL, PHENYLEPHRINE HCL 325; 25; 5 MG/1; MG/1; MG/1
3 TABLET ORAL AT BEDTIME
Refills: 0 | Status: DISCONTINUED | OUTPATIENT
Start: 2025-02-12 | End: 2025-02-13

## 2025-02-12 RX ORDER — INSULIN LISPRO 100/ML
0 VIAL (ML) SUBCUTANEOUS
Refills: 0 | DISCHARGE

## 2025-02-12 RX ORDER — DM/PSEUDOEPHED/ACETAMINOPHEN 10-30-250
12.5 CAPSULE ORAL ONCE
Refills: 0 | Status: DISCONTINUED | OUTPATIENT
Start: 2025-02-12 | End: 2025-02-13

## 2025-02-12 RX ORDER — DM/PSEUDOEPHED/ACETAMINOPHEN 10-30-250
15 CAPSULE ORAL ONCE
Refills: 0 | Status: DISCONTINUED | OUTPATIENT
Start: 2025-02-12 | End: 2025-02-13

## 2025-02-12 RX ORDER — OXYCODONE HYDROCHLORIDE 30 MG/1
2.5 TABLET ORAL EVERY 4 HOURS
Refills: 0 | Status: DISCONTINUED | OUTPATIENT
Start: 2025-02-12 | End: 2025-02-13

## 2025-02-12 RX ORDER — ACETAMINOPHEN 160 MG/5ML
975 SUSPENSION ORAL EVERY 8 HOURS
Refills: 0 | Status: DISCONTINUED | OUTPATIENT
Start: 2025-02-12 | End: 2025-02-13

## 2025-02-12 RX ORDER — SODIUM CHLORIDE 9 G/ML
1000 INJECTION, SOLUTION INTRAVENOUS
Refills: 0 | Status: DISCONTINUED | OUTPATIENT
Start: 2025-02-12 | End: 2025-02-13

## 2025-02-12 RX ORDER — SODIUM CHLORIDE 0.4 %
1 AEROSOL, SPRAY (ML) NASAL
Refills: 0 | Status: DISCONTINUED | OUTPATIENT
Start: 2025-02-12 | End: 2025-02-12

## 2025-02-12 RX ORDER — CEFAZOLIN SODIUM IN 0.9 % NACL 2 G/10 ML
2000 SYRINGE (ML) INTRAVENOUS EVERY 8 HOURS
Refills: 0 | Status: COMPLETED | OUTPATIENT
Start: 2025-02-12 | End: 2025-02-13

## 2025-02-12 RX ORDER — BACTERIOSTATIC SODIUM CHLORIDE 0.9 %
1000 VIAL (ML) INJECTION
Refills: 0 | Status: DISCONTINUED | OUTPATIENT
Start: 2025-02-12 | End: 2025-02-12

## 2025-02-12 RX ORDER — LEVOTHYROXINE SODIUM 25 UG/1
88 TABLET ORAL DAILY
Refills: 0 | Status: DISCONTINUED | OUTPATIENT
Start: 2025-02-12 | End: 2025-02-12

## 2025-02-12 RX ORDER — DM/PSEUDOEPHED/ACETAMINOPHEN 10-30-250
25 CAPSULE ORAL ONCE
Refills: 0 | Status: DISCONTINUED | OUTPATIENT
Start: 2025-02-12 | End: 2025-02-12

## 2025-02-12 RX ORDER — MAGNESIUM HYDROXIDE 400 MG/5ML
400 SUSPENSION, ORAL (FINAL DOSE FORM) ORAL
Refills: 0 | DISCHARGE

## 2025-02-12 RX ORDER — INSULIN GLARGINE-YFGN 100 [IU]/ML
16 INJECTION, SOLUTION SUBCUTANEOUS
Refills: 0 | DISCHARGE

## 2025-02-12 RX ORDER — FAMOTIDINE 10 MG/ML
20 INJECTION INTRAVENOUS DAILY
Refills: 0 | Status: DISCONTINUED | OUTPATIENT
Start: 2025-02-12 | End: 2025-02-12

## 2025-02-12 RX ORDER — MECOBAL/LEVOMEFOLAT CA/B6 PHOS 2-3-35 MG
1 TABLET ORAL DAILY
Refills: 0 | Status: DISCONTINUED | OUTPATIENT
Start: 2025-02-12 | End: 2025-02-12

## 2025-02-12 RX ORDER — SENNOSIDES 8.6 MG
2 TABLET ORAL AT BEDTIME
Refills: 0 | Status: DISCONTINUED | OUTPATIENT
Start: 2025-02-12 | End: 2025-02-12

## 2025-02-12 RX ORDER — OXYCODONE HYDROCHLORIDE 30 MG/1
5 TABLET ORAL EVERY 4 HOURS
Refills: 0 | Status: DISCONTINUED | OUTPATIENT
Start: 2025-02-12 | End: 2025-02-13

## 2025-02-12 RX ORDER — METOPROLOL SUCCINATE 25 MG
25 TABLET, EXTENDED RELEASE 24 HR ORAL
Refills: 0 | Status: DISCONTINUED | OUTPATIENT
Start: 2025-02-12 | End: 2025-02-13

## 2025-02-12 RX ORDER — INSULIN LISPRO 100/ML
VIAL (ML) SUBCUTANEOUS
Refills: 0 | Status: DISCONTINUED | OUTPATIENT
Start: 2025-02-12 | End: 2025-02-12

## 2025-02-12 RX ORDER — ONDANSETRON 4 MG/1
4 TABLET, ORALLY DISINTEGRATING ORAL EVERY 6 HOURS
Refills: 0 | Status: DISCONTINUED | OUTPATIENT
Start: 2025-02-12 | End: 2025-02-13

## 2025-02-12 RX ORDER — LOSARTAN POTASSIUM 100 MG
50 TABLET ORAL DAILY
Refills: 0 | Status: DISCONTINUED | OUTPATIENT
Start: 2025-02-12 | End: 2025-02-12

## 2025-02-12 RX ORDER — MAGNESIUM HYDROXIDE 400 MG/5ML
30 SUSPENSION, ORAL (FINAL DOSE FORM) ORAL DAILY
Refills: 0 | Status: DISCONTINUED | OUTPATIENT
Start: 2025-02-12 | End: 2025-02-13

## 2025-02-12 RX ORDER — AMANTADINE HCL 50 MG/5 ML
100 SOLUTION, ORAL ORAL DAILY
Refills: 0 | Status: DISCONTINUED | OUTPATIENT
Start: 2025-02-12 | End: 2025-02-12

## 2025-02-12 RX ORDER — INSULIN LISPRO 100/ML
VIAL (ML) SUBCUTANEOUS EVERY 6 HOURS
Refills: 0 | Status: DISCONTINUED | OUTPATIENT
Start: 2025-02-12 | End: 2025-02-13

## 2025-02-12 RX ORDER — BISACODYL 5 MG
10 TABLET, DELAYED RELEASE (ENTERIC COATED) ORAL DAILY
Refills: 0 | Status: DISCONTINUED | OUTPATIENT
Start: 2025-02-12 | End: 2025-02-12

## 2025-02-12 RX ORDER — METOPROLOL SUCCINATE 25 MG
25 TABLET, EXTENDED RELEASE 24 HR ORAL
Refills: 0 | Status: DISCONTINUED | OUTPATIENT
Start: 2025-02-12 | End: 2025-02-12

## 2025-02-12 RX ORDER — DM/PSEUDOEPHED/ACETAMINOPHEN 10-30-250
25 CAPSULE ORAL ONCE
Refills: 0 | Status: DISCONTINUED | OUTPATIENT
Start: 2025-02-12 | End: 2025-02-13

## 2025-02-12 RX ORDER — HEPARIN SODIUM,PORCINE 10000/ML
5000 VIAL (ML) INJECTION
Refills: 0 | Status: DISCONTINUED | OUTPATIENT
Start: 2025-02-13 | End: 2025-02-13

## 2025-02-12 RX ORDER — DM/PSEUDOEPHED/ACETAMINOPHEN 10-30-250
12.5 CAPSULE ORAL ONCE
Refills: 0 | Status: DISCONTINUED | OUTPATIENT
Start: 2025-02-12 | End: 2025-02-12

## 2025-02-12 RX ORDER — ATORVASTATIN CALCIUM 80 MG/1
80 TABLET, FILM COATED ORAL AT BEDTIME
Refills: 0 | Status: DISCONTINUED | OUTPATIENT
Start: 2025-02-12 | End: 2025-02-13

## 2025-02-12 RX ORDER — SENNOSIDES 8.6 MG
2 TABLET ORAL AT BEDTIME
Refills: 0 | Status: DISCONTINUED | OUTPATIENT
Start: 2025-02-12 | End: 2025-02-13

## 2025-02-12 RX ORDER — GLUCAGON 3 MG/1
1 POWDER NASAL ONCE
Refills: 0 | Status: DISCONTINUED | OUTPATIENT
Start: 2025-02-12 | End: 2025-02-13

## 2025-02-12 RX ORDER — ASCORBIC ACID 500 MG/ML
500 VIAL (ML) INJECTION
Refills: 0 | Status: DISCONTINUED | OUTPATIENT
Start: 2025-02-12 | End: 2025-02-13

## 2025-02-12 RX ORDER — ACETAMINOPHEN 160 MG/5ML
1000 SUSPENSION ORAL ONCE
Refills: 0 | Status: COMPLETED | OUTPATIENT
Start: 2025-02-12 | End: 2025-02-12

## 2025-02-12 RX ORDER — SODIUM CHLORIDE 0.4 %
2 AEROSOL, SPRAY (ML) NASAL
Refills: 0 | DISCHARGE

## 2025-02-12 RX ORDER — ONDANSETRON 4 MG/1
4 TABLET, ORALLY DISINTEGRATING ORAL ONCE
Refills: 0 | Status: DISCONTINUED | OUTPATIENT
Start: 2025-02-12 | End: 2025-02-12

## 2025-02-12 RX ORDER — INSULIN LISPRO 100/ML
VIAL (ML) SUBCUTANEOUS EVERY 6 HOURS
Refills: 0 | Status: DISCONTINUED | OUTPATIENT
Start: 2025-02-12 | End: 2025-02-12

## 2025-02-12 RX ORDER — POLYETHYLENE GLYCOL 3350 17 G/17G
17 POWDER, FOR SOLUTION ORAL DAILY
Refills: 0 | Status: DISCONTINUED | OUTPATIENT
Start: 2025-02-12 | End: 2025-02-12

## 2025-02-12 RX ORDER — LEVOTHYROXINE SODIUM 25 UG/1
88 TABLET ORAL DAILY
Refills: 0 | Status: DISCONTINUED | OUTPATIENT
Start: 2025-02-12 | End: 2025-02-13

## 2025-02-12 RX ORDER — AMLODIPINE BESYLATE 5 MG
10 TABLET ORAL DAILY
Refills: 0 | Status: DISCONTINUED | OUTPATIENT
Start: 2025-02-12 | End: 2025-02-13

## 2025-02-12 RX ORDER — GLUCAGON 3 MG/1
1 POWDER NASAL ONCE
Refills: 0 | Status: DISCONTINUED | OUTPATIENT
Start: 2025-02-12 | End: 2025-02-12

## 2025-02-12 RX ADMIN — ATORVASTATIN CALCIUM 80 MILLIGRAM(S): 80 TABLET, FILM COATED ORAL at 21:49

## 2025-02-12 RX ADMIN — Medication 1: at 23:51

## 2025-02-12 RX ADMIN — ACETAMINOPHEN 975 MILLIGRAM(S): 160 SUSPENSION ORAL at 21:49

## 2025-02-12 RX ADMIN — Medication 1 DROP(S): at 17:58

## 2025-02-12 RX ADMIN — Medication 2 TABLET(S): at 21:47

## 2025-02-12 RX ADMIN — Medication 100 MILLIGRAM(S): at 21:47

## 2025-02-12 RX ADMIN — Medication 100 MILLILITER(S): at 17:03

## 2025-02-12 RX ADMIN — INSULIN GLARGINE-YFGN 16 UNIT(S): 100 INJECTION, SOLUTION SUBCUTANEOUS at 23:51

## 2025-02-12 RX ADMIN — Medication 25 MILLIGRAM(S): at 17:58

## 2025-02-12 RX ADMIN — ACETAMINOPHEN 975 MILLIGRAM(S): 160 SUSPENSION ORAL at 22:49

## 2025-02-12 RX ADMIN — Medication 500 MILLIGRAM(S): at 17:57

## 2025-02-12 RX ADMIN — ACETAMINOPHEN 400 MILLIGRAM(S): 160 SUSPENSION ORAL at 05:14

## 2025-02-12 NOTE — H&P ADULT - NSHPLABSRESULTS_GEN_ALL_CORE
Personally reviewed labs, imaging, ekg                           12.4   11.54 )-----------( 194      ( 12 Feb 2025 02:39 )             38.4       02-12    138  |  103  |  20  ----------------------------<  127[H]  3.8   |  23  |  0.52    Ca    9.8      12 Feb 2025 02:39  Mg     2.1     02-11    TPro  7.4  /  Alb  3.6  /  TBili  0.6  /  DBili  x   /  AST  17  /  ALT  19  /  AlkPhos  119  02-11              Urinalysis Basic - ( 12 Feb 2025 02:39 )    Color: x / Appearance: x / SG: x / pH: x  Gluc: 127 mg/dL / Ketone: x  / Bili: x / Urobili: x   Blood: x / Protein: x / Nitrite: x   Leuk Esterase: x / RBC: x / WBC x   Sq Epi: x / Non Sq Epi: x / Bacteria: x        PT/INR - ( 12 Feb 2025 02:39 )   PT: 12.7 sec;   INR: 1.11 ratio         PTT - ( 12 Feb 2025 02:39 )  PTT:28.9 sec    Lactate Trend            CAPILLARY BLOOD GLUCOSE      POCT Blood Glucose.: 137 mg/dL (12 Feb 2025 09:53)            Personal interpretation EKG: sinus tachycardia with no ST elevations. qtc 471    < from: CT Ankle No Cont, Right (02.12.25 @ 01:59) >    IMPRESSION:    1.  Trimalleolar fracture as detailed above.  2.  Avulsion of the anterior, lateral tibial plafond.  3.  Soft tissue gas along the anterior fracture fragments likely reflects   a penetrating type injury.  4.  Cast reduction and soft tissue swelling as above.    --- End of Report ---      < end of copied text >    < from: CT Cervical Spine No Cont (02.12.25 @ 00:54) >    IMPRESSION:    CT HEAD:  No evidence of acute intracranial pathology.    CT CERVICAL SPINE:  No acute fracture or traumatic subluxation.    Multi-level degenerative changes.      < end of copied text >

## 2025-02-12 NOTE — H&P ADULT - NSICDXPASTMEDICALHX_GEN_ALL_CORE_FT
PAST MEDICAL HISTORY:  Arthritis     Asthma     CVA (cerebrovascular accident)     Dysphagia     Hypertension     Hypothyroidism     Malignant neoplasm of unspecified site of right female breast

## 2025-02-12 NOTE — PRE-OP CHECKLIST - VERIFY SURGICAL SITE/SIDE WITH PATIENT
Topical Clindamycin Counseling: Patient counseled that this medication may cause skin irritation or allergic reactions.  In the event of skin irritation, the patient was advised to reduce the amount of the drug applied or use it less frequently.   The patient verbalized understanding of the proper use and possible adverse effects of clindamycin.  All of the patient's questions and concerns were addressed. done

## 2025-02-12 NOTE — H&P ADULT - PROBLEM SELECTOR PLAN 4
Can resume PEG tube feeds post OR  -was on pleasure feeds at [facility]- honey thicken liquids for lunch and dinner.   -NPO for now  -Speech and swallow eval

## 2025-02-12 NOTE — H&P ADULT - ASSESSMENT
89yo F with hx of dementia (AOx2), recent CVA 10/2024 with right sided deficits requiring PEG, HTN, hypothyroidism presents from [facility] acute rehab for unwitness fall admitted for right trimalleolar fracture.

## 2025-02-12 NOTE — H&P ADULT - PROBLEM SELECTOR PLAN 2
Xray of right foot- Status post casting of acute right trimalleolar fracture with mildly   improved alignment.  -CT ankle- "oblique mildly displaced fracture through distal fibula. Minimal posterior and inferior displacement   of the distal fracture fragment. Oblique mildly impacted fracture along the base of the medial malleolus. There is a coronally oriented fracture the posterior malleolus with intra-articular extension. There is mild step-off at the articular surface up to 0.4 cm. There is a comminuted avulsion fracture along the anterior margin of the lateral   tibial plafond. The fracture fragment measures approximately 1.1 x 0.9 cm.    Pre-op Risk Stratification  -ekg sinus tachycardia with no ST elevations.   -[facility]ending [facility] records (requested) on prior echos/cardiac workup  -RCRI score 2-4 pending on cardiac records. Xray of right foot- Status post casting of acute right trimalleolar fracture with mildly   improved alignment.  -CT ankle- "oblique mildly displaced fracture through distal fibula. Minimal posterior and inferior displacement   of the distal fracture fragment. Oblique mildly impacted fracture along the base of the medial malleolus. There is a coronally oriented fracture the posterior malleolus with intra-articular extension. There is mild step-off at the articular surface up to 0.4 cm. There is a comminuted avulsion fracture along the anterior margin of the lateral   tibial plafond. The fracture fragment measures approximately 1.1 x 0.9 cm.    Pre-op Risk Stratification  -ekg sinus tachycardia with no ST elevations.   -[facility]ending [facility] records (requested) on prior echos/cardiac workup  -RCRI score 2-4 pending on cardiac records.  -Not medically optimized, pending OSH records/collateral Xray of right foot- Status post casting of acute right trimalleolar fracture with mildly   improved alignment.  -CT ankle- "oblique mildly displaced fracture through distal fibula. Minimal posterior and inferior displacement   of the distal fracture fragment. Oblique mildly impacted fracture along the base of the medial malleolus. There is a coronally oriented fracture the posterior malleolus with intra-articular extension. There is mild step-off at the articular surface up to 0.4 cm. There is a comminuted avulsion fracture along the anterior margin of the lateral   tibial plafond. The fracture fragment measures approximately 1.1 x 0.9 cm.    Pre-op Risk Stratification  -ekg sinus tachycardia with no ST elevations.   -RCRI score 2, TTE 10/2024 from [facility] with EF 70-75% with no wma   -Medically optimized for ortho procedure

## 2025-02-12 NOTE — H&P ADULT - PROBLEM SELECTOR PLAN 6
cw home amlopdine 10mg daily and metoprolol 25mg mg bid  -hold losartan 50mg daily for now. BP in acceptable range

## 2025-02-12 NOTE — H&P ADULT - NSHPPHYSICALEXAM_GEN_ALL_CORE
Vital Signs Last 24 Hrs  T(C): 36.7 (12 Feb 2025 09:48), Max: 37.6 (11 Feb 2025 22:50)  T(F): 98.1 (12 Feb 2025 09:48), Max: 99.6 (11 Feb 2025 22:50)  HR: 85 (12 Feb 2025 09:48) (78 - 100)  BP: 145/82 (12 Feb 2025 09:48) (116/81 - 145/82)  BP(mean): --  RR: 16 (12 Feb 2025 09:48) (16 - 20)  SpO2: 96% (12 Feb 2025 09:48) (95% - 98%)    Parameters below as of 12 Feb 2025 08:43  Patient On (Oxygen Delivery Method): room air        CONSTITUTIONAL: Well-groomed, in no apparent distress, resting comfortably   EYES: No conjunctival or scleral injection, non-icteric  ENMT: no pharyngeal injection or exudates, oral mucosa with moist membranes  NECK: Trachea midline without palpable neck mass; nontender  RESPIRATORY: Breathing comfortably; lungs CTA without wheeze/rhonchi/rales  CARDIOVASCULAR: +S1S2, RRR, 1+ LE edema on left  GASTROINTESTINAL: No palpable masses or tenderness, +BS throughout, no rebound/guarding  LYMPHATIC: No cervical LAD or tenderness  MUSCULOSKELETAL: no digital clubbing or cyanosis; normal strength and tone of extremities  SKIN: No rashes or ulcers noted; no subcutaneous nodules or induration palpable  NEUROLOGIC: facial symmetry, moving left UE and left LE spontaneously. Not moving right UE or RLE (ace wrapped)- not following commands)  PSYCHIATRIC: A+O x 0, easily arousable, not following commands

## 2025-02-12 NOTE — H&P ADULT - PROBLEM SELECTOR PLAN 3
Hospitalized at [facility] 10/2024-12/2024 for CVA with right sided deficits. PEG placed. Discharged to [facility] for aute rehab  -resume heparin suba q12hr post ortho procedure  -not on ASA (?develop intracranial bleed)  -cw atorvastatin 80mg bedtime Hospitalized at [facility] 10/2024-12/2024 for CVA with right sided deficits. PEG placed. Discharged to [facility] for aute rehab. Records from [facility] showed MRI brain scute infarct left periatrial centrum semioval. 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 CT head showed left thatlmaic hemorrhage which was stable  -CTH on this admission neg for bleed  -resume heparin suba q12hr post ortho procedure  -off ASA due to intracranial hemorrhage at [place]  - atorvastatin 80mg bedtime

## 2025-02-12 NOTE — CONSULT NOTE ADULT - ATTENDING COMMENTS
Plan for surgical stabilization of right trimalleolar ankle fracture when cleared by the medical service  Continue maximum leg elevation and icing to reduce edema

## 2025-02-12 NOTE — H&P ADULT - PROBLEM SELECTOR PLAN 8
dvt ppx: heparin subq BID post OR  Diet: NPO  Dispo: pending OR with ortho dvt ppx: heparin subq BID post OR  Diet: NPO  Dispo: Medicall optimized for ortho procedure

## 2025-02-12 NOTE — H&P ADULT - PROBLEM SELECTOR PLAN 1
Found to be on the fall at the side of the bed by staff at [facility] neg for bleed. CT cervical neg for fracture  -sustained right ankle trimalleolar fracture. See below

## 2025-02-12 NOTE — PRE-ANESTHESIA EVALUATION ADULT - NSANTHPMHFT_GEN_ALL_CORE
chart and consultant notes reviewed. pt has dementia, hx obtained from chart and son at bedside. no hx prior cv/pulm dz - apparently ambulated prior to stroke without cp/sob. tte at that time unremarkable per med note. ekg today noted. fall was unwitnessed. no signs active cad/decompensated chf. 10/24 cva with right residual weakness. dm fs today < 200. surgeon states case is urgent

## 2025-02-12 NOTE — PRE-ANESTHESIA EVALUATION ADULT - NSANTHADDINFOFT_GEN_ALL_CORE
extensive rba dw son at bedside, including elevated lucien-operative risk of morbidity/mortality given medical condition- agrees with plan

## 2025-02-12 NOTE — H&P ADULT - HISTORY OF PRESENT ILLNESS
89yo F with hx of dementia (AOx2), recent CVA 10/2024 with right sided deficits requiring PEG,     Hospitalized at [facility] 10/2024 to Dec 2024 for ischemic stroke with right sided deficits. PEG was placed due to dysphagia. Patient was discharged to [facility] for acute rehab in which she was doing well.     Son Behrouz eshag  full code  89yo F with hx of dementia (AOx2), recent CVA 10/2024 with right sided deficits requiring PEG, HTN, hypothyroidism presents from [facility] acute rehab for unwitness fall. She was hospitalized at [facility] 10/2024 to Dec 2024 for stroke with right sided deficits (ischemic vs hemorrhagic?). PEG was placed due to dysphagia. Patient was discharged to [facility] for acute rehab in which she was doing well per son. Yesterday, she was found on the floor by her bed and ems was called.       At [facility], patient was on PEG tube feeds and pleasure feeds    In the ED, vitals stable and found to have right trimalleolar fracture.

## 2025-02-13 ENCOUNTER — TRANSCRIPTION ENCOUNTER (OUTPATIENT)
Age: 89
End: 2025-02-13

## 2025-02-13 VITALS
SYSTOLIC BLOOD PRESSURE: 102 MMHG | HEART RATE: 93 BPM | DIASTOLIC BLOOD PRESSURE: 63 MMHG | TEMPERATURE: 97 F | RESPIRATION RATE: 19 BRPM | OXYGEN SATURATION: 93 %

## 2025-02-13 LAB
ANION GAP SERPL CALC-SCNC: 12 MMOL/L — SIGNIFICANT CHANGE UP (ref 5–17)
BASOPHILS # BLD AUTO: 0.08 K/UL — SIGNIFICANT CHANGE UP (ref 0–0.2)
BASOPHILS NFR BLD AUTO: 0.9 % — SIGNIFICANT CHANGE UP (ref 0–2)
BUN SERPL-MCNC: 23 MG/DL — SIGNIFICANT CHANGE UP (ref 7–23)
CALCIUM SERPL-MCNC: 8.8 MG/DL — SIGNIFICANT CHANGE UP (ref 8.4–10.5)
CHLORIDE SERPL-SCNC: 107 MMOL/L — SIGNIFICANT CHANGE UP (ref 96–108)
CO2 SERPL-SCNC: 23 MMOL/L — SIGNIFICANT CHANGE UP (ref 22–31)
CREAT SERPL-MCNC: 0.57 MG/DL — SIGNIFICANT CHANGE UP (ref 0.5–1.3)
EGFR: 86 ML/MIN/1.73M2 — SIGNIFICANT CHANGE UP
EOSINOPHIL # BLD AUTO: 0.26 K/UL — SIGNIFICANT CHANGE UP (ref 0–0.5)
EOSINOPHIL NFR BLD AUTO: 3 % — SIGNIFICANT CHANGE UP (ref 0–6)
GLUCOSE BLDC GLUCOMTR-MCNC: 128 MG/DL — HIGH (ref 70–99)
GLUCOSE BLDC GLUCOMTR-MCNC: 163 MG/DL — HIGH (ref 70–99)
GLUCOSE BLDC GLUCOMTR-MCNC: 175 MG/DL — HIGH (ref 70–99)
GLUCOSE SERPL-MCNC: 124 MG/DL — HIGH (ref 70–99)
HCT VFR BLD CALC: 33.1 % — LOW (ref 34.5–45)
HGB BLD-MCNC: 10.3 G/DL — LOW (ref 11.5–15.5)
IMM GRANULOCYTES NFR BLD AUTO: 0.2 % — SIGNIFICANT CHANGE UP (ref 0–0.9)
LYMPHOCYTES # BLD AUTO: 1.48 K/UL — SIGNIFICANT CHANGE UP (ref 1–3.3)
LYMPHOCYTES # BLD AUTO: 17 % — SIGNIFICANT CHANGE UP (ref 13–44)
MCHC RBC-ENTMCNC: 29.4 PG — SIGNIFICANT CHANGE UP (ref 27–34)
MCHC RBC-ENTMCNC: 31.1 G/DL — LOW (ref 32–36)
MCV RBC AUTO: 94.6 FL — SIGNIFICANT CHANGE UP (ref 80–100)
MONOCYTES # BLD AUTO: 1.19 K/UL — HIGH (ref 0–0.9)
MONOCYTES NFR BLD AUTO: 13.6 % — SIGNIFICANT CHANGE UP (ref 2–14)
NEUTROPHILS # BLD AUTO: 5.7 K/UL — SIGNIFICANT CHANGE UP (ref 1.8–7.4)
NEUTROPHILS NFR BLD AUTO: 65.3 % — SIGNIFICANT CHANGE UP (ref 43–77)
NRBC BLD AUTO-RTO: 0 /100 WBCS — SIGNIFICANT CHANGE UP (ref 0–0)
PLATELET # BLD AUTO: 176 K/UL — SIGNIFICANT CHANGE UP (ref 150–400)
POTASSIUM SERPL-MCNC: 3.6 MMOL/L — SIGNIFICANT CHANGE UP (ref 3.5–5.3)
POTASSIUM SERPL-SCNC: 3.6 MMOL/L — SIGNIFICANT CHANGE UP (ref 3.5–5.3)
RBC # BLD: 3.5 M/UL — LOW (ref 3.8–5.2)
RBC # FLD: 15.3 % — HIGH (ref 10.3–14.5)
SODIUM SERPL-SCNC: 142 MMOL/L — SIGNIFICANT CHANGE UP (ref 135–145)
WBC # BLD: 8.73 K/UL — SIGNIFICANT CHANGE UP (ref 3.8–10.5)
WBC # FLD AUTO: 8.73 K/UL — SIGNIFICANT CHANGE UP (ref 3.8–10.5)

## 2025-02-13 PROCEDURE — 84484 ASSAY OF TROPONIN QUANT: CPT

## 2025-02-13 PROCEDURE — C1713: CPT

## 2025-02-13 PROCEDURE — 86901 BLOOD TYPING SEROLOGIC RH(D): CPT

## 2025-02-13 PROCEDURE — 85014 HEMATOCRIT: CPT

## 2025-02-13 PROCEDURE — 71045 X-RAY EXAM CHEST 1 VIEW: CPT

## 2025-02-13 PROCEDURE — 80053 COMPREHEN METABOLIC PANEL: CPT

## 2025-02-13 PROCEDURE — 76377 3D RENDER W/INTRP POSTPROCES: CPT

## 2025-02-13 PROCEDURE — 96374 THER/PROPH/DIAG INJ IV PUSH: CPT

## 2025-02-13 PROCEDURE — C1769: CPT

## 2025-02-13 PROCEDURE — 76000 FLUOROSCOPY <1 HR PHYS/QHP: CPT

## 2025-02-13 PROCEDURE — 87637 SARSCOV2&INF A&B&RSV AMP PRB: CPT

## 2025-02-13 PROCEDURE — 83735 ASSAY OF MAGNESIUM: CPT

## 2025-02-13 PROCEDURE — 73700 CT LOWER EXTREMITY W/O DYE: CPT | Mod: MC

## 2025-02-13 PROCEDURE — 84132 ASSAY OF SERUM POTASSIUM: CPT

## 2025-02-13 PROCEDURE — 73610 X-RAY EXAM OF ANKLE: CPT

## 2025-02-13 PROCEDURE — 70450 CT HEAD/BRAIN W/O DYE: CPT | Mod: MC

## 2025-02-13 PROCEDURE — 85610 PROTHROMBIN TIME: CPT

## 2025-02-13 PROCEDURE — 82962 GLUCOSE BLOOD TEST: CPT

## 2025-02-13 PROCEDURE — 36415 COLL VENOUS BLD VENIPUNCTURE: CPT

## 2025-02-13 PROCEDURE — 85025 COMPLETE CBC W/AUTO DIFF WBC: CPT

## 2025-02-13 PROCEDURE — 73590 X-RAY EXAM OF LOWER LEG: CPT

## 2025-02-13 PROCEDURE — 72170 X-RAY EXAM OF PELVIS: CPT

## 2025-02-13 PROCEDURE — 27823 TREATMENT OF ANKLE FRACTURE: CPT

## 2025-02-13 PROCEDURE — 84295 ASSAY OF SERUM SODIUM: CPT

## 2025-02-13 PROCEDURE — 82435 ASSAY OF BLOOD CHLORIDE: CPT

## 2025-02-13 PROCEDURE — 82803 BLOOD GASES ANY COMBINATION: CPT

## 2025-02-13 PROCEDURE — 86850 RBC ANTIBODY SCREEN: CPT

## 2025-02-13 PROCEDURE — 85027 COMPLETE CBC AUTOMATED: CPT

## 2025-02-13 PROCEDURE — 72125 CT NECK SPINE W/O DYE: CPT | Mod: MC

## 2025-02-13 PROCEDURE — 86900 BLOOD TYPING SEROLOGIC ABO: CPT

## 2025-02-13 PROCEDURE — 83605 ASSAY OF LACTIC ACID: CPT

## 2025-02-13 PROCEDURE — 82330 ASSAY OF CALCIUM: CPT

## 2025-02-13 PROCEDURE — 83036 HEMOGLOBIN GLYCOSYLATED A1C: CPT

## 2025-02-13 PROCEDURE — 97162 PT EVAL MOD COMPLEX 30 MIN: CPT

## 2025-02-13 PROCEDURE — 85018 HEMOGLOBIN: CPT

## 2025-02-13 PROCEDURE — 85730 THROMBOPLASTIN TIME PARTIAL: CPT

## 2025-02-13 PROCEDURE — 73562 X-RAY EXAM OF KNEE 3: CPT

## 2025-02-13 PROCEDURE — 82947 ASSAY GLUCOSE BLOOD QUANT: CPT

## 2025-02-13 PROCEDURE — 73630 X-RAY EXAM OF FOOT: CPT

## 2025-02-13 PROCEDURE — 99285 EMERGENCY DEPT VISIT HI MDM: CPT | Mod: 25

## 2025-02-13 PROCEDURE — 80048 BASIC METABOLIC PNL TOTAL CA: CPT

## 2025-02-13 RX ORDER — MAGNESIUM CITRATE
1 SOLUTION, ORAL ORAL
Qty: 0 | Refills: 0 | DISCHARGE

## 2025-02-13 RX ORDER — ACETAMINOPHEN 160 MG/5ML
2 SUSPENSION ORAL
Qty: 0 | Refills: 0 | DISCHARGE
Start: 2025-02-13

## 2025-02-13 RX ORDER — ASCORBIC ACID 500 MG/ML
1 VIAL (ML) INJECTION
Qty: 0 | Refills: 0 | DISCHARGE

## 2025-02-13 RX ORDER — MECOBAL/LEVOMEFOLAT CA/B6 PHOS 2-3-35 MG
1 TABLET ORAL
Qty: 0 | Refills: 0 | DISCHARGE

## 2025-02-13 RX ORDER — SENNOSIDES 8.6 MG
2 TABLET ORAL
Qty: 0 | Refills: 0 | DISCHARGE

## 2025-02-13 RX ORDER — AMANTADINE HCL 50 MG/5 ML
10 SOLUTION, ORAL ORAL
Qty: 0 | Refills: 0 | DISCHARGE

## 2025-02-13 RX ADMIN — FAMOTIDINE 20 MILLIGRAM(S): 10 INJECTION INTRAVENOUS at 11:13

## 2025-02-13 RX ADMIN — ACETAMINOPHEN 975 MILLIGRAM(S): 160 SUSPENSION ORAL at 16:33

## 2025-02-13 RX ADMIN — Medication 10 MILLIGRAM(S): at 05:47

## 2025-02-13 RX ADMIN — Medication 1 DROP(S): at 05:47

## 2025-02-13 RX ADMIN — Medication 25 MILLIGRAM(S): at 05:47

## 2025-02-13 RX ADMIN — Medication 50 MILLIGRAM(S): at 05:47

## 2025-02-13 RX ADMIN — ACETAMINOPHEN 975 MILLIGRAM(S): 160 SUSPENSION ORAL at 06:57

## 2025-02-13 RX ADMIN — Medication 1: at 14:24

## 2025-02-13 RX ADMIN — Medication 5000 UNIT(S): at 06:32

## 2025-02-13 RX ADMIN — Medication 1: at 06:33

## 2025-02-13 RX ADMIN — POLYETHYLENE GLYCOL 3350 17 GRAM(S): 17 POWDER, FOR SOLUTION ORAL at 11:13

## 2025-02-13 RX ADMIN — LEVOTHYROXINE SODIUM 88 MICROGRAM(S): 25 TABLET ORAL at 05:47

## 2025-02-13 RX ADMIN — Medication 500 MILLIGRAM(S): at 17:27

## 2025-02-13 RX ADMIN — Medication 100 MILLIGRAM(S): at 05:48

## 2025-02-13 RX ADMIN — ACETAMINOPHEN 975 MILLIGRAM(S): 160 SUSPENSION ORAL at 05:47

## 2025-02-13 RX ADMIN — Medication 100 MILLIGRAM(S): at 11:13

## 2025-02-13 RX ADMIN — Medication 500 MILLIGRAM(S): at 05:47

## 2025-02-13 RX ADMIN — Medication 1 DROP(S): at 17:28

## 2025-02-13 RX ADMIN — ACETAMINOPHEN 975 MILLIGRAM(S): 160 SUSPENSION ORAL at 14:03

## 2025-02-13 NOTE — DIETITIAN INITIAL EVALUATION ADULT - PROBLEM SELECTOR PLAN 3
Hospitalized at [facility] 10/2024-12/2024 for CVA with right sided deficits. PEG placed. Discharged to [facility] for aute rehab. Records from [facility] showed MRI brain scute infarct left periatrial centrum semioval. 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 CT head showed left thatlmaic hemorrhage which was stable  -CTH on this admission neg for bleed  -resume heparin suba q12hr post ortho procedure  -off ASA due to intracranial hemorrhage at [place]  - atorvastatin 80mg bedtime

## 2025-02-13 NOTE — DISCHARGE NOTE NURSING/CASE MANAGEMENT/SOCIAL WORK - NSDCFUADDAPPT_GEN_ALL_CORE_FT
You must follow up with your orthopedic surgeon, Dr. Brown, within one week of discharge - please call to make an appointment.    You must follow up with your primary medical doctor within 2-3 days of discharge - please call to make an appointment.      APPTS ARE READY TO BE MADE: [X] YES    Best Family or Patient Contact (if needed):    Additional Information about above appointments (if needed):    1: Primary medical doctor  2: Orthopedic surgeon  3:     Other comments or requests:

## 2025-02-13 NOTE — DIETITIAN INITIAL EVALUATION ADULT - PERTINENT LABORATORY DATA
02-13    142  |  107  |  23  ----------------------------<  124[H]  3.6   |  23  |  0.57    Ca    8.8      13 Feb 2025 07:05  Mg     2.1     02-11    TPro  7.4  /  Alb  3.6  /  TBili  0.6  /  DBili  x   /  AST  17  /  ALT  19  /  AlkPhos  119  02-11  POCT Blood Glucose.: 163 mg/dL (02-13-25 @ 06:10)

## 2025-02-13 NOTE — DIETITIAN INITIAL EVALUATION ADULT - OTHER INFO
oral synthroid ordered    Weights:  - UBW (per patient): unable to obtain  - Dosing Weight (per chart): 119.9 pounds (2/12)   -  pounds +/- 10%   - Per [facility] HIE: 130 pounds (11/27/22), 128 pounds (5/14/22)   RD to continue to monitor weights and trends as able.  s/p Right Ankle CRPP on 2/12/25 per chart    - oral synthroid ordered    Weights:  - UBW (per patient): unable to obtain  - Dosing Weight (per chart): 119.9 pounds (2/12)   -  pounds +/- 10%   - Per [facility] HIE: 130 pounds (11/27/22), 128 pounds (5/14/22)   RD to continue to monitor weights and trends as able.   Noted Pt with edema per flowsheets, s/p surgery - weight changes can be expected likely related to fluid shifts

## 2025-02-13 NOTE — DISCHARGE NOTE PROVIDER - ATTENDING DISCHARGE PHYSICAL EXAMINATION:
Pt seen and examined at bedside- appears flushed; granddaughter at bedside states that is not her baseline. Otherwise she feels pt is more dysarthric than baseline. Pt herself denies any uncontrolled pains and notes she is comfortable. Exam notes generalized flushing particularly in face and chest, RLE casted, PEG site clean. Discussed with alfred and pt's son over the phone- they request pt to be on free water both with TF's and separately as free water flushes 250cc TID. They also request she not be put on pain meds as her pain is negligible and she is very sensitive to side effects. Noted that patient had two doses of ancef perioperatively- I logged this as a reaction in patient's chart and advised son of the reaction which may be an allergy. They wish for patient to have both TFs and pleasure PO feeds with thickened liquids- ordered these. I noted that her dysarthria is likely effect of dehydration and likely lingering effect of anesthesia and is expected to improve with resumption of flushes and full diet. I also noted to them her CTH was negative and its unlikely she is having another stroke or brain bleed. I mentioned that since she was found down at rehab it is not totally clear that she did not syncopize somehow- however given that she is unable to move by herself, son noted this is very unlikely and that they wish to forego syncope workup in favor of prompt return back to rehab as they know the pt and how to care for her and they wish for her to return to rehab. Pt medically cleared for dc once able to be placed. Family in agreement.

## 2025-02-13 NOTE — DIETITIAN INITIAL EVALUATION ADULT - ORAL INTAKE PTA/DIET HISTORY
Pt visited, Pt sleeping soundly at visit, no visitors at bedside at time of visit. Unable to obtain subjective information at this time. RD to follow up as able. Pt admitted from rehab, transfer sheets not available in chart. per H&P: "At [facility], patient was on PEG tube feeds and pleasure feeds"  No food allergies noted per EMR  No micronutrient supplementation noted per home medication list  Pt visited, Pt sleeping soundly at visit, no visitors at bedside at time of visit. Unable to obtain subjective information at this time. RD to follow up as able. Pt admitted from rehab, transfer sheets not available in chart at time of assessment.   - per H&P: "At [facility], patient was on PEG tube feeds and pleasure feeds"   - "was on pleasure feeds at [facility]- honey thicken liquids for lunch and dinner" per chart  No food allergies noted per EMR  No micronutrient supplementation noted per home medication list

## 2025-02-13 NOTE — PHYSICAL THERAPY INITIAL EVALUATION ADULT - ADDITIONAL COMMENTS
Pt admitted from rehab; as per granddaughter at translating at bedside,  PTA, pt was residing in apartment, alone, 3 setps to enter, ambulatory with RW.

## 2025-02-13 NOTE — DISCHARGE NOTE PROVIDER - HOSPITAL COURSE
HPI:  89yo F with hx of dementia (AOx2), recent CVA 10/2024 with right sided deficits requiring PEG, HTN, hypothyroidism presents from [facility] acute rehab for unwitness fall. She was hospitalized at [facility] 10/2024 to Dec 2024 for stroke with right sided deficits (ischemic vs hemorrhagic?). PEG was placed due to dysphagia. Patient was discharged to [facility] for acute rehab in which she was doing well per son. Yesterday, she was found on the floor by her bed and ems was called.       At [facility], patient was on PEG tube feeds and pleasure feeds    In the ED, vitals stable and found to have right trimalleolar fracture.   (12 Feb 2025 08:19)    Hospital Course:  On February 12, 2025, patient underwent a percutaneous repair of her right ankle joint (s/p CRPP).  Patient to be NWB RLE in SLC; OOB with assistance as needed.  PT recommending MANUEL.  Patient cleared for discharge from an orthopedic perspective.      Important Medication Changes and Reason:    Active or Pending Issues Requiring Follow-up:    Advanced Directives:   [ ] Full code  [ ] DNR  [ ] Hospice    Discharge Diagnoses:         HPI:  89yo F with hx of dementia (AOx2), recent CVA 10/2024 with right sided deficits requiring PEG, HTN, hypothyroidism presents from [facility] acute rehab for unwitness fall. She was hospitalized at [facility] 10/2024 to Dec 2024 for stroke with right sided deficits (ischemic vs hemorrhagic?). PEG was placed due to dysphagia. Patient was discharged to [facility] for acute rehab in which she was doing well per son. Yesterday, she was found on the floor by her bed and ems was called.       At [facility], patient was on PEG tube feeds and pleasure feeds    In the ED, vitals stable and found to have right trimalleolar fracture.   (12 Feb 2025 08:19)    Hospital Course:  On February 12, 2025, patient underwent a percutaneous repair of her right ankle joint (s/p CRPP).  Patient to be NWB RLE in [facility]; OOB with assistance as needed.  PT recommending MANUEL.  Patient cleared for discharge from an orthopedic perspective.      Important Medication Changes and Reason:    Active or Pending Issues Requiring Follow-up:  F/u with orthopedics Dr. Brown within one week of discharge  F/u with PCP within one week of discharge    Advanced Directives:   [ ] Full code  [ ] DNR  [ ] Hospice    Discharge Diagnoses:  Right tri-malleolar fracture

## 2025-02-13 NOTE — DIETITIAN INITIAL EVALUATION ADULT - OTHER CALCULATIONS
defer fluid needs to medical team   Estimated protein-energy needs calculated using dosing weight with consideration for s/p surgery, wound healing

## 2025-02-13 NOTE — DISCHARGE NOTE PROVIDER - NSDCFUADDINST_GEN_ALL_CORE_FT
Non-weight bearing RLE in Short Leg Cast    Your rehab doctor must evaluate in 3-4 days to determine if/when to restart your lasix.

## 2025-02-13 NOTE — CONSULT NOTE ADULT - ASSESSMENT
89yo F with hx of dementia (AOx2), recent CVA 10/2024 with right sided deficits requiring PEG, HTN, hypothyroidism presents from [facility] acute rehab for unwitness fall. She was hospitalized at [facility] 10/2024 to Dec 2024 for stroke with right sided deficits (ischemic vs hemorrhagic?). PEG was placed due to dysphagia. Patient was discharged to [facility] for acute rehab in which she was doing well per son. Yesterday, she was found on the floor by her bed and ems was called.   At [facility], patient was on PEG tube feeds and pleasure feeds  pt is well known to me with hx of cad, cva s/p admission to [facility] with prolonged admission , s/p intubation and multiple episodes of respiratory failure , intubation and aspiration, family refused trach with s/p fall  will review all records at [facility]  last echo EF normal ?diastolic dysfunction  continue all cardiac and bp meds  speech and swallow consult  dvt prophylaxis  will review all records  dvt prophylaxis

## 2025-02-13 NOTE — DIETITIAN INITIAL EVALUATION ADULT - REASON FOR ADMISSION
Displaced trimalleolar fracture of right lower leg, initial encounter for closed fracture    per H&P: "89yo F with hx of dementia (AOx2), recent CVA 10/2024 with right sided deficits requiring PEG, HTN, hypothyroidism presents from [facility] acute rehab for unwitness fall."

## 2025-02-13 NOTE — DISCHARGE NOTE PROVIDER - NSDCMRMEDTOKEN_GEN_ALL_CORE_FT
albuterol 2.5 mg/3 mL (0.083%) inhalation solution: 3 milliliter(s) by nebulizer 4 times a day  amantadine 50 mg/5 mL oral liquid: 10 milliliter(s) orally once a day  amLODIPine 10 mg oral tablet: 1 tab(s) orally once a day  Artificial Tears ophthalmic solution: 1 drop(s) in each eye 2 times a day  atorvastatin 80 mg oral tablet: 1 tab(s) orally once a day  docusate sodium 50 mg/15 mL oral syrup: 15 milliliter(s) orally 2 times a day  Dulcolax Laxative 10 mg rectal suppository: 1 suppository(ies) rectally prn  famotidine 20 mg oral tablet: 1 tab(s) orally once a day  furosemide 20 mg oral tablet: 1 tab(s) orally once a day  heparin 5000 units/mL injectable solution: 5,000 unit(s) subcutaneously once a day  HumaLOG KwikPen 200 units/mL (Concentrated) subcutaneous solution: subcutaneous 4 times a day Sliding scale, 12am, 6am, 12pm, 6pm  Lantus Solostar Pen 100 units/mL subcutaneous solution: 16 unit(s) subcutaneous once a day (at bedtime)  levothyroxine 88 mcg (0.088 mg) oral tablet: 1 tab(s) orally once a day  losartan 50 mg oral tablet: 1 tab(s) orally once a day  magnesium citrate:   magnesium hydroxide: 400 milligram(s) by gastrostomy tube every 48 hours as needed for  constipation  Metoprolol Tartrate 25 mg oral tablet: 1 tab(s) orally 2 times a day  MiraLax oral powder for reconstitution: 17 gram(s) orally once a day  Multiple Vitamins oral tablet: 1 tab(s) orally once a day  senna (sennosides) 8.6 mg oral tablet: 2 tab(s) orally once a day (at bedtime)  sodium chloride nasal: 2 spray(s) nasal 2 times a day  Vitamin C 500 mg oral capsule: 1 cap(s) orally 2 times a day   acetaminophen 325 mg oral tablet: 2 tab(s) by gastrostomy tube every 6 hours as needed for  mild/moderate pain  albuterol 2.5 mg/3 mL (0.083%) inhalation solution: 3 milliliter(s) by nebulizer 4 times a day as needed for  shortness of breath and/or wheezing  amantadine 50 mg/5 mL oral liquid: 10 milliliter(s) by gastrostomy tube once a day  amLODIPine 10 mg oral tablet: 1 tab(s) by gastrostomy tube once a day Hold for SBP less than 110, and notify MD  Artificial Tears ophthalmic solution: 1 drop(s) in each eye 2 times a day  atorvastatin 80 mg oral tablet: 1 tab(s) by gastrostomy tube once a day  docusate sodium 50 mg/15 mL oral syrup: 15 milliliter(s) orally 2 times a day  Dulcolax Laxative 10 mg rectal suppository: 1 suppository(ies) rectally once a day as needed for  constipation  famotidine 20 mg oral tablet: 1 tab(s) by gastrostomy tube once a day  heparin 5000 units/mL injectable solution: 5,000 unit(s) subcutaneously once a day x 30 days and then re-evaluate need  HumaLOG KwikPen 200 units/mL (Concentrated) subcutaneous solution: subcutaneous 4 times a day Sliding scale, 12am, 6am, 12pm, 6pm  Lantus Solostar Pen 100 units/mL subcutaneous solution: 16 unit(s) subcutaneous once a day (at bedtime)  levothyroxine 88 mcg (0.088 mg) oral tablet: 1 tab(s) by gastrostomy tube once a day  losartan 50 mg oral tablet: 1 tab(s) by gastrostomy tube once a day  magnesium citrate: 1 by gastrostomy tube once as needed for  constipation  magnesium hydroxide: 400 milligram(s) by gastrostomy tube every 48 hours as needed for  constipation  Metoprolol Tartrate 25 mg oral tablet: 1 tab(s) by gastrostomy tube 2 times a day Hold for SBP less than 110 or HR less than 60, and notify MD  MiraLax oral powder for reconstitution: 17 gram(s) orally once a day  Multiple Vitamins oral tablet: 1 tab(s) by gastrostomy tube once a day  senna (sennosides) 8.6 mg oral tablet: 2 tab(s) by gastrostomy tube once a day (at bedtime)  sodium chloride nasal: 2 spray(s) nasal 2 times a day  Vitamin C 500 mg oral capsule: 1 cap(s) by gastrostomy tube 2 times a day   acetaminophen 325 mg oral tablet: 2 tab(s) by gastrostomy tube every 6 hours as needed for  mild/moderate pain  albuterol 2.5 mg/3 mL (0.083%) inhalation solution: 3 milliliter(s) by nebulizer 4 times a day as needed for  shortness of breath and/or wheezing  amantadine 50 mg/5 mL oral liquid: 10 milliliter(s) by gastrostomy tube once a day  amLODIPine 10 mg oral tablet: 1 tab(s) by gastrostomy tube once a day Hold for SBP less than 110, and notify MD  Artificial Tears ophthalmic solution: 1 drop(s) in each eye 2 times a day  atorvastatin 80 mg oral tablet: 1 tab(s) by gastrostomy tube once a day  docusate sodium 50 mg/15 mL oral syrup: 15 milliliter(s) orally 2 times a day  Dulcolax Laxative 10 mg rectal suppository: 1 suppository(ies) rectally once a day as needed for  constipation  famotidine 20 mg oral tablet: 1 tab(s) by gastrostomy tube once a day  heparin 5000 units/mL injectable solution: 5,000 unit(s) subcutaneously once a day x 30 days and then re-evaluate need  HumaLOG KwikPen 200 units/mL (Concentrated) subcutaneous solution: subcutaneous 4 times a day Sliding scale, 12am, 6am, 12pm, 6pm  Lantus Solostar Pen 100 units/mL subcutaneous solution: 16 unit(s) subcutaneous once a day (at bedtime)  levothyroxine 88 mcg (0.088 mg) oral tablet: 1 tab(s) by gastrostomy tube once a day  losartan 50 mg oral tablet: 1 tab(s) by gastrostomy tube once a day Hold for SBP less than 110 and notify MD  magnesium citrate: 1 by gastrostomy tube once as needed for  constipation  magnesium hydroxide: 400 milligram(s) by gastrostomy tube every 48 hours as needed for  constipation  Metoprolol Tartrate 25 mg oral tablet: 1 tab(s) by gastrostomy tube 2 times a day Hold for SBP less than 110 or HR less than 60, and notify MD  MiraLax oral powder for reconstitution: 17 gram(s) orally once a day  Multiple Vitamins oral tablet: 1 tab(s) by gastrostomy tube once a day  senna (sennosides) 8.6 mg oral tablet: 2 tab(s) by gastrostomy tube once a day (at bedtime)  sodium chloride nasal: 2 spray(s) nasal 2 times a day  Vitamin C 500 mg oral capsule: 1 cap(s) by gastrostomy tube 2 times a day

## 2025-02-13 NOTE — DIETITIAN INITIAL EVALUATION ADULT - PROBLEM SELECTOR PLAN 1
Found to be on the fall at the side of the bed by staff at [facility] neg for bleed. CT cervical neg for fracture  -sustained right ankle trimalleolar fracture. See below

## 2025-02-13 NOTE — DIETITIAN INITIAL EVALUATION ADULT - ADD RECOMMEND
1. Defer PO diet/pleasure feeds to medical team discretion. Defer diet texture/consistency to team/SLP   2. Recommend Glucerna at goal of   1. Defer PO diet/pleasure feeds to medical team discretion. Defer diet texture/consistency to team/SLP   2. Recommend Glucerna 1.5 at goal of 55 mL/hr x 20 hours due to synthroid administration (provides 1100 mL, 1650 kcal, 91 grams protein, 835 mL free water per day; provides 30 kcal/kg, 1.6 grams protein/kg based on dosing weight 54.4 kg)  - defer free water flushes to medical discretion   3. Recommend adding Multivitamin pending no medical contraindications, to promote wound healing  4. Monitor PO intake, PO diet tolerance, tolerance to tube feeding, skin, weight, nutrition related labs, goals of care   5. Consider checking HbA1c

## 2025-02-13 NOTE — PHYSICAL THERAPY INITIAL EVALUATION ADULT - GENERAL OBSERVATIONS, REHAB EVAL
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

## 2025-02-13 NOTE — DIETITIAN INITIAL EVALUATION ADULT - NSFNSADHERENCEPTAFT_GEN_A_CORE
PMH DM noted. Insulin noted per home medication list.  Fingersticks reviewed in house, insulin ordered in house. PMH DM noted. Insulin noted per home medication list.  Fingersticks reviewed in house, insulin ordered in house. No HbA1c per recent lab results.

## 2025-02-13 NOTE — DIETITIAN INITIAL EVALUATION ADULT - PROBLEM SELECTOR PLAN 2
Xray of right foot- Status post casting of acute right trimalleolar fracture with mildly   improved alignment.  -CT ankle- "oblique mildly displaced fracture through distal fibula. Minimal posterior and inferior displacement   of the distal fracture fragment. Oblique mildly impacted fracture along the base of the medial malleolus. There is a coronally oriented fracture the posterior malleolus with intra-articular extension. There is mild step-off at the articular surface up to 0.4 cm. There is a comminuted avulsion fracture along the anterior margin of the lateral   tibial plafond. The fracture fragment measures approximately 1.1 x 0.9 cm.    Pre-op Risk Stratification  -ekg sinus tachycardia with no ST elevations.   -RCRI score 2, TTE 10/2024 from St [facility] with EF 70-75% with no wma   -Medically optimized for ortho procedure

## 2025-02-13 NOTE — DIETITIAN INITIAL EVALUATION ADULT - NSFNSGIIOFT_GEN_A_CORE
RN denies nausea, vomiting, diarrhea, constipation.  RN denies nausea, vomiting, diarrhea, constipation. bowel movement recorded 2/12 per flowsheets

## 2025-02-13 NOTE — DIETITIAN INITIAL EVALUATION ADULT - ENERGY INTAKE
Pt NPO with tube feeding when seen. Glucerna 1.5 infusing at current goal of 55 mL/hr at visit. Tolerating per RN.  Glucerna 1.5 at 55 mL/hr x 22 hours provides 1210 mL, 1815 kcal, 100 grams protein, 918 mL free water per day (provides 33 kcal/kg, 1.8 grams protein/kg based on dosing weight of 54.4 kg)

## 2025-02-13 NOTE — DIETITIAN INITIAL EVALUATION ADULT - REASON INDICATOR FOR ASSESSMENT
RD assessment warranted for  enteral nutrition   Source: Patient visited, Pt sleeping soundly at visit, Electronic Medical Record, RN   Chart reviewed, events noted.  RD assessment warranted for enteral nutrition   Source: Patient visited, Pt sleeping soundly at visit, Electronic Medical Record, RN   Chart reviewed, events noted.

## 2025-02-13 NOTE — DISCHARGE NOTE NURSING/CASE MANAGEMENT/SOCIAL WORK - NSDCPETBCESMAN_GEN_ALL_CORE
Chart accessed for SME purposes.   If you are a smoker, it is important for your health to stop smoking. Please be aware that second hand smoke is also harmful.

## 2025-02-13 NOTE — PHYSICAL THERAPY INITIAL EVALUATION ADULT - PERTINENT HX OF CURRENT PROBLEM, REHAB EVAL
Pt is 89yF admitted 2/12/25 PMHx dementia (AOx2), recent CVA 10/2024 with right sided deficits requiring PEG, HTN, hypothyroidism presents from [facility] for unwitness fall. She was hospitalized at [facility] 10/2024 to Dec 2024 for stroke with right sided deficits (ischemic vs hemorrhagic?). PEG was placed due to dysphagia. Yesterday, she was found on the floor by her bed and ems was called.  In the ED, vitals stable and found to have right trimalleolar fracture.      CT ANKLE: Trimalleolar fracture as detailed above. Avulsion of the anterior, lateral tibial plafond.  Soft tissue gas along the anterior fracture fragments likely reflects a penetrating type injury.  Cast reduction and soft tissue swelling as above.CT HEAD:No evidence of acute intracranial pathology.CT CERVICAL SPINE:No acute fracture or traumatic subluxation.Multi-level degenerative changes. XRAY CHEST: clear lungs.  XRAY PELVIS: No acute, displaced fracture.

## 2025-02-13 NOTE — DISCHARGE NOTE PROVIDER - NSDCFUSCHEDAPPT_GEN_ALL_CORE_FT
[facility]  [facility] 1 [facility]  Scheduled Appointment: 03/18/2025

## 2025-02-13 NOTE — PROGRESS NOTE ADULT - SUBJECTIVE AND OBJECTIVE BOX
ORTHOPEDIC PROGRESS NOTE    POD # 1    Overnight events: None    SUBJECTIVE: Pt seen and examined at bedside. Patient is doing well, no acute complaints this AM. Pain is controlled with medication      OBJECTIVE:  Vital Signs Last 24 Hrs  T(C): 37 (12 Feb 2025 20:05), Max: 37 (12 Feb 2025 05:34)  T(F): 98.6 (12 Feb 2025 20:05), Max: 98.6 (12 Feb 2025 05:34)  HR: 76 (12 Feb 2025 20:05) (76 - 100)  BP: 139/75 (12 Feb 2025 20:05) (116/81 - 149/64)  BP(mean): 89 (12 Feb 2025 17:00) (88 - 99)  RR: 18 (12 Feb 2025 20:05) (12 - 18)  SpO2: 93% (12 Feb 2025 20:05) (93% - 98%)    Parameters below as of 12 Feb 2025 20:05  Patient On (Oxygen Delivery Method): room air    Physical Examination:  GEN: NAD, resting quietly  PULM: symmetric chest rise bilaterally, no increased WOB  ABD: nondistended  EXTR:   RLE:  Short leg cast clean/dry/intact.   Able to grossly move Great, second and third toe in response to tactile stimuli; Limited appreciable movement of fourth and fifth toe, both somewhat obscured by splint padding.   Patient unable to endorses or deny sensation over the digits of the Right foot, but responds to tactile stimuli of the toes.    Capillary refill less than 2 seconds in all toes.   Accessible compartments soft and compressible.          LABS:                        12.4   11.54 )-----------( 194      ( 12 Feb 2025 02:39 )             38.4       02-12    138  |  103  |  20  ----------------------------<  127[H]  3.8   |  23  |  0.52    Ca    9.8      12 Feb 2025 02:39  Mg     2.1     02-11    TPro  7.4  /  Alb  3.6  /  TBili  0.6  /  DBili  x   /  AST  17  /  ALT  19  /  AlkPhos  119  02-11

## 2025-02-13 NOTE — DIETITIAN INITIAL EVALUATION ADULT - NS FNS DIET ORDER
Diet, Clear Liquid:   Moderately Thick Liquids (MODTHICKLIQS)  Tube Feeding Modality: Gastrostomy  Glucerna 1.5 Kend[place] (GLUCERNA1.5RTH)  Total Volume for 24 Hours (mL): 1210  Continuous  Starting Tube Feed Rate {mL per Hour}: 55  Until Goal Tube Feed Rate (mL per Hour): 55  Tube Feed Duration (in Hours): 22  Tube Feed Start Time: 16:00  Free Water Flush  Bolus   Total Volume per Flush (mL): 250   Frequency: Every 8 Hours   Total Daily Volume of Flush (mL): 750    Start Time: 06:00  Pump   Rate (mL per Hour): 50   Frequency: Every Hour    Duration (Hours): 22    Start Time: 16:00 (02-13-25 @ 12:57)

## 2025-02-13 NOTE — DISCHARGE NOTE PROVIDER - CARE PROVIDER_API CALL
Ismael Brown  Orthopaedic Surgery  [address], [address]  [place] [address]  Phone: [phone]  Fax: [phone]  Follow Up Time: 1 week

## 2025-02-13 NOTE — DISCHARGE NOTE NURSING/CASE MANAGEMENT/SOCIAL WORK - PATIENT PORTAL LINK FT
You can access the FollowMyHealth Patient Portal offered by [facility] by registering at the following website: [email or web address]. By joining [employer]’s FollowMyHealth portal, you will also be able to view your health information using other applications (apps) compatible with our system.

## 2025-02-13 NOTE — DISCHARGE NOTE NURSING/CASE MANAGEMENT/SOCIAL WORK - NSDCPEFALRISK_GEN_ALL_CORE
09/01/21 3:57 PM     See documentation in the VB [employer] SmartForm       Delta How
For information on Fall & Injury Prevention, visit: [email or web address] OR  [email or web address] OR  https://www.cdc.gov/steadi/patient.html

## 2025-02-13 NOTE — DISCHARGE NOTE NURSING/CASE MANAGEMENT/SOCIAL WORK - NSDCVIVACCINE_GEN_ALL_CORE_FT
influenza, high-dose, quadrivalent; 29-Nov-2022 14:27; Bob Gramajo (INGRIS); [employer]; [id number] (Exp. Date: 30-Jun-2023); IntraMuscular; Deltoid Left.; 0.7 milliLiter(s); VIS (VIS Published: 06-Aug-2021, VIS Presented: 29-Nov-2022);

## 2025-02-13 NOTE — CHART NOTE - NSCHARTNOTEFT_GEN_A_CORE
Request from Dr. Pierce to facilitate patient discharge.  Medication reconciliation reviewed, revised, and resolved with Dr. Pierce, who has medically cleared patient for discharge with follow up as advised.  Please refer to discharge note for detailed hospital course.
Orthopaedic Surgery: Post-Operative Note    Lithuanian: [id number]    Patient interviewed and examined at bedside in PACU, resting comfortably. Pain well-controlled. Patient is awake and alert, but non-participatory in question-directed interview or prompt-based Physical Examination, which is consistent with her preoperative baseline in the setting of her known dementia diagnosis. Per covering RN, no issues since arrival in PACU postoperatively.       VITAL SIGNS  T(C): 36.7 (02-12-25 @ 12:51), Max: 37.6 (02-11-25 @ 22:50)  HR: 85 (02-12-25 @ 12:51) (78 - 100)  BP: 145/82 (02-12-25 @ 12:51) (116/81 - 145/82)  RR: 16 (02-12-25 @ 12:51) (16 - 20)  SpO2: 96% (02-12-25 @ 12:51) (95% - 98%)    PHYSICAL EXAM  GEN: NAD    RLE:  Short leg cast clean/dry/intact.   Able to grossly move Great, second and third toe in response to tactile stimuli; Limited appreciable movement of fourth and fifth toe, both somewhat obscured by splint padding.   Patient unable to endorses or deny sensation over the digits of the Right foot, but responds to tactile stimuli of the toes.    Capillary refill less than 2 seconds in all toes.   Accessible compartments soft and compressible.          ASSESSMENT:  89F s/p Right Ankle CRPP on 2/12/25; Stable.     PLAN:  - Pain control.   - DVT Ppx: Hold until POD1: Heparin 5000U QD.     - NWB RLE in SLC / OOB with assistance as needed.   - PT/OT.   - Ice as needed.   - Encourage incentive spirometry.   - DC planning: Anticipate return to long-term facility once deemed Medically stable.   - Orthopedically stable for discharge    - Will discuss with Dr. Brown and will advise if plan changes.

## 2025-02-13 NOTE — PROGRESS NOTE ADULT - ASSESSMENT
ASSESSMENT:  89F s/p Right Ankle CRPP on 2/12/25; Stable.     PLAN:  - Pain control.   - DVT Ppx: Hold until POD1: Heparin 5000U QD.     - NWB RLE in SLC / OOB with assistance as needed.   - PT/OT.   - Ice as needed.   - Encourage incentive spirometry.   - DC planning: Anticipate return to long-term facility once deemed Medically stable.   - Orthopedically stable for discharge    - Will discuss with Dr. Brown and will advise if plan changes.    For all questions related to patient care, please reach out to the on-call team via the pager.     Janice Cuenca, PGY 3  Orthopaedic Surgery  [facility] [id number]  [facility] [id number]  [facility] [phone]

## 2025-02-13 NOTE — DISCHARGE NOTE PROVIDER - INSTRUCTIONS
Glucerna 1.5 lourdes  55cc/hr x 22 hours via PEG; continue free water bolus schedule as prior to admission  Continue moderately thickened liquids for pleasure feeds as tolerated, as prior to admission

## 2025-02-13 NOTE — DIETITIAN INITIAL EVALUATION ADULT - PERTINENT MEDS FT
MEDICATIONS  (STANDING):  acetaminophen     Tablet .. 975 milliGRAM(s) Oral every 8 hours  amantadine Syrup 100 milliGRAM(s) Oral daily  amLODIPine   Tablet 10 milliGRAM(s) Oral daily  artificial  tears Solution 1 Drop(s) Both EYES two times a day  ascorbic acid 500 milliGRAM(s) Oral two times a day  atorvastatin 80 milliGRAM(s) Oral at bedtime  dextrose 5%. 1000 milliLiter(s) (50 mL/Hr) IV Continuous <Continuous>  dextrose 5%. 1000 milliLiter(s) (100 mL/Hr) IV Continuous <Continuous>  dextrose 50% Injectable 25 Gram(s) IV Push once  dextrose 50% Injectable 12.5 Gram(s) IV Push once  dextrose 50% Injectable 25 Gram(s) IV Push once  famotidine    Tablet 20 milliGRAM(s) Oral daily  glucagon  Injectable 1 milliGRAM(s) IntraMuscular once  heparin   Injectable 5000 Unit(s) SubCutaneous <User Schedule>  influenza  Vaccine (HIGH DOSE) 0.5 milliLiter(s) IntraMuscular once  insulin glargine Injectable (LANTUS) 16 Unit(s) SubCutaneous at bedtime  insulin lispro (ADMELOG) corrective regimen sliding scale   SubCutaneous every 6 hours  levothyroxine 88 MICROGram(s) Oral daily  lidocaine 1%/epinephrine 1:100,000 Inj 1 Vial(s) Local Injection Once  losartan 50 milliGRAM(s) Oral daily  metoprolol tartrate 25 milliGRAM(s) Oral two times a day  polyethylene glycol 3350 17 Gram(s) Oral daily  senna 2 Tablet(s) Oral at bedtime  sodium chloride 0.9%. 1000 milliLiter(s) (100 mL/Hr) IV Continuous <Continuous>    MEDICATIONS  (PRN):  dextrose Oral Gel 15 Gram(s) Oral once PRN Blood Glucose LESS THAN 70 milliGRAM(s)/deciliter  magnesium hydroxide Suspension 30 milliLiter(s) Oral daily PRN Constipation  melatonin 3 milliGRAM(s) Oral at bedtime PRN Insomnia  ondansetron Injectable 4 milliGRAM(s) IV Push every 6 hours PRN Nausea and/or Vomiting  oxyCODONE    IR 2.5 milliGRAM(s) Oral every 4 hours PRN Moderate Pain (4 - 6)  oxyCODONE    IR 5 milliGRAM(s) Oral every 4 hours PRN Severe Pain (7 - 10)  traMADol 50 milliGRAM(s) Oral every 6 hours PRN Mild Pain (1 - 3)

## 2025-02-13 NOTE — DISCHARGE NOTE NURSING/CASE MANAGEMENT/SOCIAL WORK - FINANCIAL ASSISTANCE
[facility] provides services at a reduced cost to those who are determined to be eligible through [facility]’s financial assistance program. Information regarding [facility]’s financial assistance program can be found by going to [email or web address] or by calling [phone].

## 2025-02-13 NOTE — DISCHARGE NOTE PROVIDER - NSDCCPCAREPLAN_GEN_ALL_CORE_FT
PRINCIPAL DISCHARGE DIAGNOSIS  Diagnosis: Closed right trimalleolar fracture  Assessment and Plan of Treatment: s/p percutaneous repair of right ankle joint (right ankle CRPP) on 2/12/2025  You must follow up with your orthopedic surgeon, Dr. Brown, within one week of discharge  DVT prophylaxis with subcut heparin  NWB RLE in SLC/OOB with assistance as needed  PT/OT  Ice as needed  Encourage incentive spirometry      SECONDARY DISCHARGE DIAGNOSES  Diagnosis: Unwitnessed fall  Assessment and Plan of Treatment: Fall precautions  Physical therapy    Diagnosis: Hypertension  Assessment and Plan of Treatment: Your lasix was held during this admission.  Your rehab doctor must evaluate within 3-4 days to determine if/when to restart your lasix.  Take all medication as prescribed.  Follow up with your medical doctor for routine blood pressure monitoring at your next visit.  Notify your doctor if you have any of the following symptoms:   Dizziness, Lightheadedness, Blurry vision, Headache, Chest pain, Shortness of breath      Diagnosis: Dysphagia  Assessment and Plan of Treatment: Continue with PEG feeds, as prior to admission, with free water boluses  Aspiration precautions    Diagnosis: Diabetes mellitus  Assessment and Plan of Treatment: Make sure you get your HgA1c checked every three months.  If you take oral diabetes medications, check your blood glucose two times a day.  Check your blood glucose every 6 hours.  Continue with tube feeds via PEG.  Keep a log of your blood glucose results and always take it with you to your doctor appointments.  Keep a list of your current medications including injectables and over the counter medications and bring this medication list with you to all your doctor appointments.  If you have not seen your ophthalmologist this year call for appointment.  Check your feet daily for redness, sores, or openings. Do not self treat. If no improvement in two days call your primary care physician for an appointment.  Low blood sugar (hypoglycemia) is a blood sugar below 70mg/dl. Check your blood sugar if you feel signs/symptoms of hypoglycemia. If your blood sugar is below 70 take 15 grams of carbohydrates (ex 4 oz of apple juice, 3-4 glucose tablets, or 4-6 oz of regular soda) wait 15 minutes and repeat blood sugar to make sure it comes up above 70.  If your blood sugar is above 70 and you are due for a meal, have a meal.  If you are not due for a meal have a snack.  This snack helps keeps your blood sugar at a safe range.      Diagnosis: Hypothyroidism  Assessment and Plan of Treatment: you do not make enough thyroid hormone  signs & symptoms of low levels of thyroid hormone - tired, getting cold easily, coarse or thin hair, constipation, shortness of breath, swelling, irregular periods  your doctor will do thyroid hormone blood tests at least once a year to monitor if medication dose is adequate  take your thyroid medicine as directed by your doctor & on empty stomach

## 2025-02-13 NOTE — CONSULT NOTE ADULT - SUBJECTIVE AND OBJECTIVE BOX
Orthopedic Surgery Consult Note    89yFemale p/w R ankle pain/deformity and inability to bear weight s/p unwitnessed mechanical fall at acute rehab. Unknown headstrike/LOC. No other bone or joint complaints. Patient had recent stroke with R sided hemiparesis for which she was being managed at acute rehab. She has been unable to ambulate since her stroke. Currently on subQ heparin.                           13.7   10.64 )-----------( 242      ( 11 Feb 2025 23:03 )             41.6     11 Feb 2025 23:03    138    |  102    |  21     ----------------------------<  144    3.6     |  24     |  0.54     Ca    10.1       11 Feb 2025 23:03  Mg     2.1       11 Feb 2025 23:03    TPro  7.4    /  Alb  3.6    /  TBili  0.6    /  DBili  x      /  AST  17     /  ALT  19     /  AlkPhos  119    11 Feb 2025 23:03    PT/INR - ( 11 Feb 2025 23:03 )   PT: 12.3 sec;   INR: 1.07 ratio         PTT - ( 11 Feb 2025 23:03 )  PTT:28.4 sec  Vital Signs Last 24 Hrs  T(C): 36.7 (02-12-25 @ 01:43), Max: 36.7 (02-12-25 @ 01:43)  T(F): 98 (02-12-25 @ 01:43), Max: 98 (02-12-25 @ 01:43)  HR: 78 (02-12-25 @ 01:43) (78 - 79)  BP: 142/75 (02-12-25 @ 01:43) (133/82 - 142/75)  BP(mean): --  RR: 19 (02-12-25 @ 01:43) (19 - 20)  SpO2: 98% (02-12-25 @ 01:43) (97% - 98%)    Physical Exam  Gen: NAD  RLE: Skin intact, +TTP medial/lateral malleolus, +ecchymoses and swelling surrounding joint   limited ankle ROM due to pain  motor intact distally  SILT s/s/sp/dp/t  2+ DP    Imaging:  XR showing R trimalleolar fracture    Procedure: Closed reduction performed followed by placement of a well padded trilam splint. Patient tolerated the procedure well. Post procedure imaging obtained and showed improved alignment. Post procedure the patient was NV intact.    A/P: 89yFemale with R trimal fracture s/p closed reduction and splinting    - Pain control  - NWB RLE, short-leg trilam splint  - splint precautions  - NPO in case of possible OR  - please document medical clearance for possible OR  - ice/elevation         
Date of Service, 02-13-25 @ 07:28  CHIEF COMPLAINT:Patient is a 89y old  Female who presents with a chief complaint of unwitnessed fall with foot fracture (13 Feb 2025 03:39)      HPI:  89yo F with hx of dementia (AOx2), recent CVA 10/2024 with right sided deficits requiring PEG, HTN, hypothyroidism presents from [facility] acute rehab for unwitness fall. She was hospitalized at [facility] 10/2024 to Dec 2024 for stroke with right sided deficits (ischemic vs hemorrhagic?). PEG was placed due to dysphagia. Patient was discharged to [facility] for acute rehab in which she was doing well per son. Yesterday, she was found on the floor by her bed and ems was called.   At [facility], patient was on PEG tube feeds and pleasure feeds      PAST MEDICAL & SURGICAL HISTORY:  Arthritis  Asthma  Malignant neoplasm of unspecified site of right female breast  CVA (cerebrovascular accident)  Hypothyroidism  Dysphagia  Hypertension  S/P hysterectomy  40 years ago  H/O right breast biopsy  12/2019    MEDICATIONS  (STANDING):  acetaminophen     Tablet .. 975 milliGRAM(s) Oral every 8 hours  amantadine Syrup 100 milliGRAM(s) Oral daily  amLODIPine   Tablet 10 milliGRAM(s) Oral daily  artificial  tears Solution 1 Drop(s) Both EYES two times a day  ascorbic acid 500 milliGRAM(s) Oral two times a day  atorvastatin 80 milliGRAM(s) Oral at bedtime  dextrose 5%. 1000 milliLiter(s) (50 mL/Hr) IV Continuous <Continuous>  dextrose 5%. 1000 milliLiter(s) (100 mL/Hr) IV Continuous <Continuous>  dextrose 50% Injectable 25 Gram(s) IV Push once  dextrose 50% Injectable 12.5 Gram(s) IV Push once  dextrose 50% Injectable 25 Gram(s) IV Push once  famotidine    Tablet 20 milliGRAM(s) Oral daily  glucagon  Injectable 1 milliGRAM(s) IntraMuscular once  heparin   Injectable 5000 Unit(s) SubCutaneous <User Schedule>  influenza  Vaccine (HIGH DOSE) 0.5 milliLiter(s) IntraMuscular once  insulin glargine Injectable (LANTUS) 16 Unit(s) SubCutaneous at bedtime  insulin lispro (ADMELOG) corrective regimen sliding scale   SubCutaneous every 6 hours  levothyroxine 88 MICROGram(s) Oral daily  lidocaine 1%/epinephrine 1:100,000 Inj 1 Vial(s) Local Injection Once  losartan 50 milliGRAM(s) Oral daily  metoprolol tartrate 25 milliGRAM(s) Oral two times a day  polyethylene glycol 3350 17 Gram(s) Oral daily  senna 2 Tablet(s) Oral at bedtime  sodium chloride 0.9%. 1000 milliLiter(s) (100 mL/Hr) IV Continuous <Continuous>    MEDICATIONS  (PRN):  dextrose Oral Gel 15 Gram(s) Oral once PRN Blood Glucose LESS THAN 70 milliGRAM(s)/deciliter  magnesium hydroxide Suspension 30 milliLiter(s) Oral daily PRN Constipation  melatonin 3 milliGRAM(s) Oral at bedtime PRN Insomnia  ondansetron Injectable 4 milliGRAM(s) IV Push every 6 hours PRN Nausea and/or Vomiting  oxyCODONE    IR 2.5 milliGRAM(s) Oral every 4 hours PRN Moderate Pain (4 - 6)  oxyCODONE    IR 5 milliGRAM(s) Oral every 4 hours PRN Severe Pain (7 - 10)  traMADol 50 milliGRAM(s) Oral every 6 hours PRN Mild Pain (1 - 3)      FAMILY HISTORY:  No pertinent family history in first degree relatives        SOCIAL HISTORY:    [ x] Non-smoker  [ ] Smoker  [ ] Alcohol    Allergies    No Known Allergies    Intolerances    	    REVIEW OF SYSTEMS:  CONSTITUTIONAL: No fever, weight loss, or fatigue  EYES: No eye pain, visual disturbances, or discharge  ENT:  No difficulty hearing, tinnitus, vertigo; No sinus or throat pain  NECK: No pain or stiffness  RESPIRATORY: No cough, wheezing, chills or hemoptysis; No Shortness of Breath  CARDIOVASCULAR: No chest pain, palpitations, passing out, dizziness, or leg swelling  GASTROINTESTINAL: No abdominal or epigastric pain. No nausea, vomiting, or hematemesis; No diarrhea or constipation. No melena or hematochezia.  GENITOURINARY: No dysuria, frequency, hematuria, or incontinence  NEUROLOGICAL: No headaches, memory loss, loss of strength, numbness, or tremors  SKIN: No itching, burning, rashes, or lesions   LYMPH Nodes: No enlarged glands  ENDOCRINE: No heat or cold intolerance; No hair loss  MUSCULOSKELETAL: No joint pain or swelling; No muscle, back, or extremity pain  PSYCHIATRIC: No depression, anxiety, mood swings, or difficulty sleeping  HEME/LYMPH: No easy bruising, or bleeding gums  ALLERGY AND IMMUNOLOGIC: No hives or eczema	    [ ] All others negative	  [x ] Unable to obtain    PHYSICAL EXAM:  T(C): 37 (02-13-25 @ 04:26), Max: 37 (02-12-25 @ 20:05)  HR: 96 (02-13-25 @ 04:26) (76 - 100)  BP: 128/69 (02-13-25 @ 04:26) (117/78 - 149/64)  RR: 18 (02-13-25 @ 04:26) (12 - 18)  SpO2: 94% (02-13-25 @ 04:26) (93% - 98%)  Wt(kg): --  I&O's Summary    12 Feb 2025 07:01  -  13 Feb 2025 07:00  --------------------------------------------------------  IN: 0 mL / OUT: 400 mL / NET: -400 mL        Appearance: Normal	  HEENT:   Normal oral mucosa, PERRL, EOMI	  Lymphatic: No lymphadenopathy  Cardiovascular: Normal S1 S2, No JVD, + murmurs, No edema  Respiratory: rhonchi  Gastrointestinal:  Soft, Non-tender, + BS	  Skin: No rashes, No ecchymoses, No cyanosis	  Extremities: + ? R hemiparesis R ankle bandaged  Vascular: Peripheral pulses palpable 2+ bilaterally    TELEMETRY: 	    ECG:  	  RADIOLOGY:  OTHER: 	  	  LABS:	 	    CARDIAC MARKERS:                        12.4   11.54 )-----------( 194      ( 12 Feb 2025 02:39 )             38.4     02-12    138  |  103  |  20  ----------------------------<  127[H]  3.8   |  23  |  0.52    Ca    9.8      12 Feb 2025 02:39  Mg     2.1     02-11    TPro  7.4  /  Alb  3.6  /  TBili  0.6  /  DBili  x   /  AST  17  /  ALT  19  /  AlkPhos  119  02-11    proBNP:   Lipid Profile:   HgA1c:   TSH:   PT/INR - ( 12 Feb 2025 02:39 )   PT: 12.7 sec;   INR: 1.11 ratio         PTT - ( 12 Feb 2025 02:39 )  PTT:28.9 sec    PREVIOUS DIAGNOSTIC TESTING:      < from: 12 Lead ECG (11.27.22 @ 21:13) >  Diagnosis Line NORMAL SINUS RHYTHM  NORMAL ECG  NO PREVIOUS ECGS AVAILABLE    < from: Xray Chest 1 View AP/PA (02.11.25 @ 23:44) >  The cardiac silhouette is normal in size.  No focal consolidation, pleural effusion or pneumothorax.  Visualized osseous structures are unremarkable for the patient's age.    IMPRESSION:  Clear lungs.        - Pain control.   - DVT Ppx: Hold until POD1: Heparin 5000U QD.     - NWB RLE in SLC / OOB with assistance as needed.   - PT/OT.   - Ice as needed.   - Encourage incentive spirometry.

## 2025-02-13 NOTE — DISCHARGE NOTE PROVIDER - NSDCFUADDAPPT_GEN_ALL_CORE_FT
You must follow up with your orthopedic surgeon, Dr. Brown, within one week of discharge - please call to make an appointment.    You must follow up with your primary medical doctor within 2-3 days of discharge - please call to make an appointment.      APPTS ARE READY TO BE MADE: [X] YES    Best Family or Patient Contact (if needed):    Additional Information about above appointments (if needed):    1: Primary medical doctor  2: Orthopedic surgeon  3:     Other comments or requests:    You must follow up with your orthopedic surgeon, Dr. Brown, within one week of discharge - please call to make an appointment.    You must follow up with your primary medical doctor within 2-3 days of discharge - please call to make an appointment.      APPTS ARE READY TO BE MADE: [X] YES    Best Family or Patient Contact (if needed):    Additional Information about above appointments (if needed):    1: Primary medical doctor  2: Orthopedic surgeon  3:     Other comments or requests:     Patient is being discharged to [facility]. Caregiver will arrange follow up.   You must follow up with your orthopedic surgeon, Dr. Brown, within one week of discharge - please call to make an appointment.    You must follow up with your primary medical doctor within 2-3 days of discharge - please call to make an appointment.      APPTS ARE READY TO BE MADE: [X] YES    Best Family or Patient Contact (if needed):    Additional Information about above appointments (if needed):    1: Primary medical doctor  2: Orthopedic surgeon  3:     Other comments or requests:       Patient is being discharged to [facility]. Caregiver will arrange follow up.

## 2025-02-13 NOTE — DIETITIAN INITIAL EVALUATION ADULT - PROBLEM SELECTOR PLAN 4
Can resume PEG tube feeds post OR  -was on pleasure feeds at [facility]- honey thicken liquids for lunch and dinner.   -NPO for now  -Speech and swallow eval

## 2025-02-14 LAB
A1C WITH ESTIMATED AVERAGE GLUCOSE RESULT: 6.1 % — HIGH (ref 4–5.6)
ESTIMATED AVERAGE GLUCOSE: 128 MG/DL — HIGH (ref 68–114)

## 2025-02-19 PROBLEM — I10 ESSENTIAL (PRIMARY) HYPERTENSION: Chronic | Status: ACTIVE | Noted: 2025-02-12

## 2025-02-19 PROBLEM — I63.9 CEREBRAL INFARCTION, UNSPECIFIED: Chronic | Status: ACTIVE | Noted: 2025-02-12

## 2025-02-19 PROBLEM — R13.10 DYSPHAGIA, UNSPECIFIED: Chronic | Status: ACTIVE | Noted: 2025-02-12

## 2025-02-19 PROBLEM — E03.9 HYPOTHYROIDISM, UNSPECIFIED: Chronic | Status: ACTIVE | Noted: 2025-02-12

## 2025-02-27 ENCOUNTER — NON-APPOINTMENT (OUTPATIENT)
Age: 89
End: 2025-02-27

## 2025-02-27 ENCOUNTER — APPOINTMENT (OUTPATIENT)
Dept: ORTHOPEDIC SURGERY | Facility: CLINIC | Age: 89
End: 2025-02-27
Payer: MEDICARE

## 2025-02-27 VITALS — WEIGHT: 128 LBS | HEIGHT: 58 IN | BODY MASS INDEX: 26.87 KG/M2

## 2025-02-27 DIAGNOSIS — S82.891A OTHER FRACTURE OF RIGHT LOWER LEG, INITIAL ENCOUNTER FOR CLOSED FRACTURE: ICD-10-CM

## 2025-02-27 PROCEDURE — 99024 POSTOP FOLLOW-UP VISIT: CPT

## 2025-02-27 PROCEDURE — 73610 X-RAY EXAM OF ANKLE: CPT | Mod: TC

## 2025-03-04 ENCOUNTER — APPOINTMENT (OUTPATIENT)
Dept: NEUROLOGY | Facility: CLINIC | Age: 89
End: 2025-03-04
Payer: MEDICARE

## 2025-03-04 VITALS
SYSTOLIC BLOOD PRESSURE: 128 MMHG | HEART RATE: 85 BPM | HEIGHT: 58 IN | DIASTOLIC BLOOD PRESSURE: 85 MMHG | WEIGHT: 128 LBS | BODY MASS INDEX: 26.87 KG/M2

## 2025-03-04 DIAGNOSIS — G81.91 HEMIPLEGIA, UNSPECIFIED AFFECTING RIGHT DOMINANT SIDE: ICD-10-CM

## 2025-03-04 DIAGNOSIS — I61.9 NONTRAUMATIC INTRACEREBRAL HEMORRHAGE, UNSPECIFIED: ICD-10-CM

## 2025-03-04 DIAGNOSIS — I63.9 CEREBRAL INFARCTION, UNSPECIFIED: ICD-10-CM

## 2025-03-04 PROCEDURE — 99215 OFFICE O/P EST HI 40 MIN: CPT

## 2025-03-04 RX ORDER — METOPROLOL TARTRATE 25 MG/1
25 TABLET ORAL
Refills: 0 | Status: ACTIVE | COMMUNITY

## 2025-03-04 RX ORDER — LOSARTAN POTASSIUM 50 MG/1
50 TABLET, FILM COATED ORAL
Refills: 0 | Status: ACTIVE | COMMUNITY

## 2025-03-04 RX ORDER — AMANTADINE HYDROCHLORIDE 100 1/1
100 TABLET ORAL
Refills: 0 | Status: ACTIVE | COMMUNITY

## 2025-03-04 RX ORDER — ATORVASTATIN CALCIUM 80 MG/1
80 TABLET, FILM COATED ORAL
Refills: 0 | Status: ACTIVE | COMMUNITY

## 2025-03-04 RX ORDER — INSULIN ASPART 100 [IU]/ML
100 INJECTION, SOLUTION INTRAVENOUS; SUBCUTANEOUS
Refills: 0 | Status: ACTIVE | COMMUNITY

## 2025-03-04 RX ORDER — AMLODIPINE BESYLATE 10 MG/1
10 TABLET ORAL
Refills: 0 | Status: ACTIVE | COMMUNITY

## 2025-03-04 RX ORDER — LEVOTHYROXINE SODIUM 0.09 MG/1
88 TABLET ORAL
Refills: 0 | Status: ACTIVE | COMMUNITY

## 2025-03-18 ENCOUNTER — APPOINTMENT (OUTPATIENT)
Dept: NEUROLOGY | Facility: CLINIC | Age: 89
End: 2025-03-18

## 2025-03-20 ENCOUNTER — APPOINTMENT (OUTPATIENT)
Dept: ORTHOPEDIC SURGERY | Facility: CLINIC | Age: 89
End: 2025-03-20
Payer: MEDICARE

## 2025-03-20 VITALS — WEIGHT: 128 LBS | BODY MASS INDEX: 26.87 KG/M2 | HEIGHT: 58 IN

## 2025-03-20 DIAGNOSIS — S82.891A OTHER FRACTURE OF RIGHT LOWER LEG, INITIAL ENCOUNTER FOR CLOSED FRACTURE: ICD-10-CM

## 2025-03-20 PROCEDURE — 73610 X-RAY EXAM OF ANKLE: CPT | Mod: RT

## 2025-03-20 PROCEDURE — 99213 OFFICE O/P EST LOW 20 MIN: CPT | Mod: 24

## 2025-03-31 ENCOUNTER — APPOINTMENT (OUTPATIENT)
Dept: CT IMAGING | Facility: CLINIC | Age: 89
End: 2025-03-31

## 2025-05-01 ENCOUNTER — INPATIENT (INPATIENT)
Facility: HOSPITAL | Age: 89
LOS: 6 days | Discharge: HOME HEALTH | DRG: 690 | End: 2025-05-08
Attending: INTERNAL MEDICINE | Admitting: STUDENT IN AN ORGANIZED HEALTH CARE EDUCATION/TRAINING PROGRAM
Payer: MEDICARE

## 2025-05-01 VITALS
OXYGEN SATURATION: 99 % | HEART RATE: 88 BPM | RESPIRATION RATE: 22 BRPM | DIASTOLIC BLOOD PRESSURE: 80 MMHG | SYSTOLIC BLOOD PRESSURE: 134 MMHG | TEMPERATURE: 97 F

## 2025-05-01 DIAGNOSIS — Z90.710 ACQUIRED ABSENCE OF BOTH CERVIX AND UTERUS: Chronic | ICD-10-CM

## 2025-05-01 DIAGNOSIS — Z98.890 OTHER SPECIFIED POSTPROCEDURAL STATES: Chronic | ICD-10-CM

## 2025-05-01 PROCEDURE — 99285 EMERGENCY DEPT VISIT HI MDM: CPT

## 2025-05-02 DIAGNOSIS — R13.10 DYSPHAGIA, UNSPECIFIED: ICD-10-CM

## 2025-05-02 DIAGNOSIS — I10 ESSENTIAL (PRIMARY) HYPERTENSION: ICD-10-CM

## 2025-05-02 DIAGNOSIS — N39.0 URINARY TRACT INFECTION, SITE NOT SPECIFIED: ICD-10-CM

## 2025-05-02 DIAGNOSIS — Z93.1 GASTROSTOMY STATUS: ICD-10-CM

## 2025-05-02 DIAGNOSIS — E11.9 TYPE 2 DIABETES MELLITUS WITHOUT COMPLICATIONS: ICD-10-CM

## 2025-05-02 DIAGNOSIS — E03.9 HYPOTHYROIDISM, UNSPECIFIED: ICD-10-CM

## 2025-05-02 DIAGNOSIS — I63.9 CEREBRAL INFARCTION, UNSPECIFIED: ICD-10-CM

## 2025-05-02 LAB
ALBUMIN SERPL ELPH-MCNC: 3.3 G/DL — SIGNIFICANT CHANGE UP (ref 3.3–5)
ALP SERPL-CCNC: 104 U/L — SIGNIFICANT CHANGE UP (ref 40–120)
ALT FLD-CCNC: 10 U/L — SIGNIFICANT CHANGE UP (ref 10–45)
ANION GAP SERPL CALC-SCNC: 14 MMOL/L — SIGNIFICANT CHANGE UP (ref 5–17)
APPEARANCE UR: ABNORMAL
APTT BLD: 26.5 SEC — SIGNIFICANT CHANGE UP (ref 26.1–36.8)
AST SERPL-CCNC: 12 U/L — SIGNIFICANT CHANGE UP (ref 10–40)
BACTERIA # UR AUTO: ABNORMAL /HPF
BASOPHILS # BLD AUTO: 0.07 K/UL — SIGNIFICANT CHANGE UP (ref 0–0.2)
BASOPHILS NFR BLD AUTO: 0.7 % — SIGNIFICANT CHANGE UP (ref 0–2)
BILIRUB SERPL-MCNC: 0.7 MG/DL — SIGNIFICANT CHANGE UP (ref 0.2–1.2)
BILIRUB UR-MCNC: NEGATIVE — SIGNIFICANT CHANGE UP
BUN SERPL-MCNC: 21 MG/DL — SIGNIFICANT CHANGE UP (ref 7–23)
CALCIUM SERPL-MCNC: 9.7 MG/DL — SIGNIFICANT CHANGE UP (ref 8.4–10.5)
CAST: 2 /LPF — SIGNIFICANT CHANGE UP (ref 0–4)
CHLORIDE SERPL-SCNC: 102 MMOL/L — SIGNIFICANT CHANGE UP (ref 96–108)
CO2 SERPL-SCNC: 23 MMOL/L — SIGNIFICANT CHANGE UP (ref 22–31)
COLOR SPEC: YELLOW — SIGNIFICANT CHANGE UP
CREAT SERPL-MCNC: 0.55 MG/DL — SIGNIFICANT CHANGE UP (ref 0.5–1.3)
DIFF PNL FLD: ABNORMAL
EGFR: 87 ML/MIN/1.73M2 — SIGNIFICANT CHANGE UP
EGFR: 87 ML/MIN/1.73M2 — SIGNIFICANT CHANGE UP
EOSINOPHIL # BLD AUTO: 0.88 K/UL — HIGH (ref 0–0.5)
EOSINOPHIL NFR BLD AUTO: 8.8 % — HIGH (ref 0–6)
FLUAV AG NPH QL: SIGNIFICANT CHANGE UP
FLUBV AG NPH QL: SIGNIFICANT CHANGE UP
GAS PNL BLDV: SIGNIFICANT CHANGE UP
GLUCOSE BLDC GLUCOMTR-MCNC: 105 MG/DL — HIGH (ref 70–99)
GLUCOSE BLDC GLUCOMTR-MCNC: 114 MG/DL — HIGH (ref 70–99)
GLUCOSE BLDC GLUCOMTR-MCNC: 122 MG/DL — HIGH (ref 70–99)
GLUCOSE BLDC GLUCOMTR-MCNC: 122 MG/DL — HIGH (ref 70–99)
GLUCOSE BLDC GLUCOMTR-MCNC: 125 MG/DL — HIGH (ref 70–99)
GLUCOSE SERPL-MCNC: 115 MG/DL — HIGH (ref 70–99)
GLUCOSE UR QL: NEGATIVE MG/DL — SIGNIFICANT CHANGE UP
HCT VFR BLD CALC: 40.3 % — SIGNIFICANT CHANGE UP (ref 34.5–45)
HGB BLD-MCNC: 13.1 G/DL — SIGNIFICANT CHANGE UP (ref 11.5–15.5)
IMM GRANULOCYTES NFR BLD AUTO: 0.4 % — SIGNIFICANT CHANGE UP (ref 0–0.9)
INR BLD: 1.12 RATIO — SIGNIFICANT CHANGE UP (ref 0.85–1.16)
KETONES UR-MCNC: NEGATIVE MG/DL — SIGNIFICANT CHANGE UP
LEUKOCYTE ESTERASE UR-ACNC: ABNORMAL
LYMPHOCYTES # BLD AUTO: 1.49 K/UL — SIGNIFICANT CHANGE UP (ref 1–3.3)
LYMPHOCYTES # BLD AUTO: 14.9 % — SIGNIFICANT CHANGE UP (ref 13–44)
MAGNESIUM SERPL-MCNC: 2.1 MG/DL — SIGNIFICANT CHANGE UP (ref 1.6–2.6)
MCHC RBC-ENTMCNC: 28.4 PG — SIGNIFICANT CHANGE UP (ref 27–34)
MCHC RBC-ENTMCNC: 32.5 G/DL — SIGNIFICANT CHANGE UP (ref 32–36)
MCV RBC AUTO: 87.4 FL — SIGNIFICANT CHANGE UP (ref 80–100)
MONOCYTES # BLD AUTO: 0.82 K/UL — SIGNIFICANT CHANGE UP (ref 0–0.9)
MONOCYTES NFR BLD AUTO: 8.2 % — SIGNIFICANT CHANGE UP (ref 2–14)
NEUTROPHILS # BLD AUTO: 6.69 K/UL — SIGNIFICANT CHANGE UP (ref 1.8–7.4)
NEUTROPHILS NFR BLD AUTO: 67 % — SIGNIFICANT CHANGE UP (ref 43–77)
NITRITE UR-MCNC: NEGATIVE — SIGNIFICANT CHANGE UP
NRBC BLD AUTO-RTO: 0 /100 WBCS — SIGNIFICANT CHANGE UP (ref 0–0)
PH UR: 7.5 — SIGNIFICANT CHANGE UP (ref 5–8)
PHOSPHATE SERPL-MCNC: 2.9 MG/DL — SIGNIFICANT CHANGE UP (ref 2.5–4.5)
PLATELET # BLD AUTO: 218 K/UL — SIGNIFICANT CHANGE UP (ref 150–400)
POTASSIUM SERPL-MCNC: 3.9 MMOL/L — SIGNIFICANT CHANGE UP (ref 3.5–5.3)
POTASSIUM SERPL-SCNC: 3.9 MMOL/L — SIGNIFICANT CHANGE UP (ref 3.5–5.3)
PROCALCITONIN SERPL-MCNC: 0.11 NG/ML — HIGH (ref 0.02–0.1)
PROT SERPL-MCNC: 6.9 G/DL — SIGNIFICANT CHANGE UP (ref 6–8.3)
PROT UR-MCNC: SIGNIFICANT CHANGE UP MG/DL
PROTHROM AB SERPL-ACNC: 12.9 SEC — SIGNIFICANT CHANGE UP (ref 9.9–13.4)
RBC # BLD: 4.61 M/UL — SIGNIFICANT CHANGE UP (ref 3.8–5.2)
RBC # FLD: 16.8 % — HIGH (ref 10.3–14.5)
RBC CASTS # UR COMP ASSIST: 2 /HPF — SIGNIFICANT CHANGE UP (ref 0–4)
RSV RNA NPH QL NAA+NON-PROBE: SIGNIFICANT CHANGE UP
SARS-COV-2 RNA SPEC QL NAA+PROBE: SIGNIFICANT CHANGE UP
SODIUM SERPL-SCNC: 139 MMOL/L — SIGNIFICANT CHANGE UP (ref 135–145)
SOURCE RESPIRATORY: SIGNIFICANT CHANGE UP
SP GR SPEC: 1.01 — SIGNIFICANT CHANGE UP (ref 1–1.03)
SQUAMOUS # UR AUTO: 2 /HPF — SIGNIFICANT CHANGE UP (ref 0–5)
TSH SERPL-MCNC: 4.27 UIU/ML — HIGH (ref 0.27–4.2)
UROBILINOGEN FLD QL: 1 MG/DL — SIGNIFICANT CHANGE UP (ref 0.2–1)
WBC # BLD: 9.99 K/UL — SIGNIFICANT CHANGE UP (ref 3.8–10.5)
WBC # FLD AUTO: 9.99 K/UL — SIGNIFICANT CHANGE UP (ref 3.8–10.5)
WBC UR QL: 202 /HPF — HIGH (ref 0–5)

## 2025-05-02 PROCEDURE — 93010 ELECTROCARDIOGRAM REPORT: CPT

## 2025-05-02 PROCEDURE — 70450 CT HEAD/BRAIN W/O DYE: CPT | Mod: 26

## 2025-05-02 PROCEDURE — 99222 1ST HOSP IP/OBS MODERATE 55: CPT

## 2025-05-02 PROCEDURE — G0545: CPT

## 2025-05-02 PROCEDURE — 71045 X-RAY EXAM CHEST 1 VIEW: CPT | Mod: 26

## 2025-05-02 RX ORDER — PIPERACILLIN-TAZO-DEXTROSE,ISO 2.25G/50ML
3.38 IV SOLUTION, PIGGYBACK PREMIX FROZEN(ML) INTRAVENOUS ONCE
Refills: 0 | Status: COMPLETED | OUTPATIENT
Start: 2025-05-02 | End: 2025-05-02

## 2025-05-02 RX ORDER — B1/B2/B3/B5/B6/B12/VIT C/FOLIC 500-0.5 MG
1 TABLET ORAL DAILY
Refills: 0 | Status: DISCONTINUED | OUTPATIENT
Start: 2025-05-02 | End: 2025-05-08

## 2025-05-02 RX ORDER — ASPIRIN 325 MG
81 TABLET ORAL DAILY
Refills: 0 | Status: DISCONTINUED | OUTPATIENT
Start: 2025-05-02 | End: 2025-05-08

## 2025-05-02 RX ORDER — LOSARTAN POTASSIUM 100 MG/1
50 TABLET, FILM COATED ORAL DAILY
Refills: 0 | Status: DISCONTINUED | OUTPATIENT
Start: 2025-05-02 | End: 2025-05-08

## 2025-05-02 RX ORDER — SODIUM CHLORIDE 9 G/1000ML
1000 INJECTION, SOLUTION INTRAVENOUS
Refills: 0 | Status: DISCONTINUED | OUTPATIENT
Start: 2025-05-02 | End: 2025-05-03

## 2025-05-02 RX ORDER — ASPIRIN 325 MG
81 TABLET ORAL DAILY
Refills: 0 | Status: DISCONTINUED | OUTPATIENT
Start: 2025-05-02 | End: 2025-05-02

## 2025-05-02 RX ORDER — SENNA 187 MG
2 TABLET ORAL AT BEDTIME
Refills: 0 | Status: DISCONTINUED | OUTPATIENT
Start: 2025-05-02 | End: 2025-05-08

## 2025-05-02 RX ORDER — AMLODIPINE BESYLATE 10 MG/1
10 TABLET ORAL DAILY
Refills: 0 | Status: DISCONTINUED | OUTPATIENT
Start: 2025-05-02 | End: 2025-05-08

## 2025-05-02 RX ORDER — HYPROMELLOSE 0.4 %
1 DROPS OPHTHALMIC (EYE)
Refills: 0 | Status: DISCONTINUED | OUTPATIENT
Start: 2025-05-02 | End: 2025-05-08

## 2025-05-02 RX ORDER — HEPARIN SODIUM 1000 [USP'U]/ML
5000 INJECTION INTRAVENOUS; SUBCUTANEOUS EVERY 12 HOURS
Refills: 0 | Status: DISCONTINUED | OUTPATIENT
Start: 2025-05-02 | End: 2025-05-08

## 2025-05-02 RX ORDER — POLYETHYLENE GLYCOL 3350 17 G/17G
17 POWDER, FOR SOLUTION ORAL DAILY
Refills: 0 | Status: DISCONTINUED | OUTPATIENT
Start: 2025-05-02 | End: 2025-05-08

## 2025-05-02 RX ORDER — METOPROLOL SUCCINATE 50 MG/1
25 TABLET, EXTENDED RELEASE ORAL
Refills: 0 | Status: DISCONTINUED | OUTPATIENT
Start: 2025-05-02 | End: 2025-05-08

## 2025-05-02 RX ORDER — ATORVASTATIN CALCIUM 80 MG/1
80 TABLET, FILM COATED ORAL AT BEDTIME
Refills: 0 | Status: DISCONTINUED | OUTPATIENT
Start: 2025-05-02 | End: 2025-05-08

## 2025-05-02 RX ORDER — INSULIN LISPRO 100 U/ML
INJECTION, SOLUTION INTRAVENOUS; SUBCUTANEOUS EVERY 6 HOURS
Refills: 0 | Status: DISCONTINUED | OUTPATIENT
Start: 2025-05-02 | End: 2025-05-03

## 2025-05-02 RX ORDER — DEXTROSE 50 % IN WATER 50 %
25 SYRINGE (ML) INTRAVENOUS ONCE
Refills: 0 | Status: DISCONTINUED | OUTPATIENT
Start: 2025-05-02 | End: 2025-05-03

## 2025-05-02 RX ORDER — BISACODYL 5 MG
10 TABLET, DELAYED RELEASE (ENTERIC COATED) ORAL DAILY
Refills: 0 | Status: DISCONTINUED | OUTPATIENT
Start: 2025-05-02 | End: 2025-05-08

## 2025-05-02 RX ORDER — DEXTROSE 50 % IN WATER 50 %
15 SYRINGE (ML) INTRAVENOUS ONCE
Refills: 0 | Status: DISCONTINUED | OUTPATIENT
Start: 2025-05-02 | End: 2025-05-03

## 2025-05-02 RX ORDER — ACETAMINOPHEN 500 MG/5ML
1000 LIQUID (ML) ORAL ONCE
Refills: 0 | Status: DISCONTINUED | OUTPATIENT
Start: 2025-05-02 | End: 2025-05-08

## 2025-05-02 RX ORDER — LEVOTHYROXINE SODIUM 300 MCG
88 TABLET ORAL DAILY
Refills: 0 | Status: DISCONTINUED | OUTPATIENT
Start: 2025-05-02 | End: 2025-05-08

## 2025-05-02 RX ORDER — ERTAPENEM SODIUM 1 G/1
1000 INJECTION, POWDER, LYOPHILIZED, FOR SOLUTION INTRAMUSCULAR; INTRAVENOUS EVERY 24 HOURS
Refills: 0 | Status: DISCONTINUED | OUTPATIENT
Start: 2025-05-02 | End: 2025-05-04

## 2025-05-02 RX ORDER — CEFTRIAXONE 500 MG/1
1000 INJECTION, POWDER, FOR SOLUTION INTRAMUSCULAR; INTRAVENOUS EVERY 24 HOURS
Refills: 0 | Status: DISCONTINUED | OUTPATIENT
Start: 2025-05-02 | End: 2025-05-02

## 2025-05-02 RX ORDER — ALBUTEROL SULFATE 2.5 MG/3ML
2.5 VIAL, NEBULIZER (ML) INHALATION ONCE
Refills: 0 | Status: COMPLETED | OUTPATIENT
Start: 2025-05-02 | End: 2025-05-02

## 2025-05-02 RX ORDER — MELATONIN 5 MG
3 TABLET ORAL ONCE
Refills: 0 | Status: COMPLETED | OUTPATIENT
Start: 2025-05-02 | End: 2025-05-03

## 2025-05-02 RX ORDER — GLUCAGON 3 MG/1
1 POWDER NASAL ONCE
Refills: 0 | Status: DISCONTINUED | OUTPATIENT
Start: 2025-05-02 | End: 2025-05-08

## 2025-05-02 RX ORDER — DEXTROSE 50 % IN WATER 50 %
12.5 SYRINGE (ML) INTRAVENOUS ONCE
Refills: 0 | Status: DISCONTINUED | OUTPATIENT
Start: 2025-05-02 | End: 2025-05-03

## 2025-05-02 RX ADMIN — ERTAPENEM SODIUM 100 MILLIGRAM(S): 1 INJECTION, POWDER, LYOPHILIZED, FOR SOLUTION INTRAMUSCULAR; INTRAVENOUS at 15:47

## 2025-05-02 RX ADMIN — Medication 1 TABLET(S): at 11:37

## 2025-05-02 RX ADMIN — Medication 200 GRAM(S): at 05:25

## 2025-05-02 RX ADMIN — POLYETHYLENE GLYCOL 3350 17 GRAM(S): 17 POWDER, FOR SOLUTION ORAL at 11:37

## 2025-05-02 RX ADMIN — Medication 1 DROP(S): at 17:14

## 2025-05-02 RX ADMIN — HEPARIN SODIUM 5000 UNIT(S): 1000 INJECTION INTRAVENOUS; SUBCUTANEOUS at 17:15

## 2025-05-02 RX ADMIN — AMLODIPINE BESYLATE 10 MILLIGRAM(S): 10 TABLET ORAL at 11:37

## 2025-05-02 RX ADMIN — Medication 81 MILLIGRAM(S): at 17:17

## 2025-05-02 RX ADMIN — LOSARTAN POTASSIUM 50 MILLIGRAM(S): 100 TABLET, FILM COATED ORAL at 11:37

## 2025-05-02 RX ADMIN — Medication 20 MILLIGRAM(S): at 11:37

## 2025-05-02 RX ADMIN — METOPROLOL SUCCINATE 25 MILLIGRAM(S): 50 TABLET, EXTENDED RELEASE ORAL at 17:14

## 2025-05-02 RX ADMIN — ATORVASTATIN CALCIUM 80 MILLIGRAM(S): 80 TABLET, FILM COATED ORAL at 23:04

## 2025-05-02 RX ADMIN — CEFTRIAXONE 100 MILLIGRAM(S): 500 INJECTION, POWDER, FOR SOLUTION INTRAMUSCULAR; INTRAVENOUS at 11:36

## 2025-05-02 RX ADMIN — Medication 2.5 MILLIGRAM(S): at 20:10

## 2025-05-02 NOTE — DIETITIAN INITIAL EVALUATION ADULT - NUTRITIONGOAL OUTCOME1
- patient will receive >80% of nutritional needs from enteral nutrition regimen w/ good tolerance and to help aid in wound healing

## 2025-05-02 NOTE — CONSULT NOTE ADULT - PROBLEM SELECTOR RECOMMENDATION 3
s/p PEG   recent issues with PEG as per family at bedside   GI consulted   Nutrition consult regarding PEG feeds

## 2025-05-02 NOTE — ADVANCED PRACTICE NURSE CONSULT - ASSESSMENT
Arrived on 6 TOWER on 5/2/25, patient was found lying in a low air loss pressure redistribution support surface style bed. Patient is awake, but confused. Patient's daughter at bed side. In with INGRIS Stewart. Ms. Livingston was unable to turn independently. Staff assistance X 2 required for turning. Once turned, able to view her skin. Patient with a Pure Wick external catheter for urinary diversion.     Skin examination reveals;  ·	a Stage I pressure injury located on the Sacrum / B/L buttocks, present on admission.  The area measures approximately 6.0 x 6.0 x 0.0 cm. Surrounding skin: non-blanchable erythema. No open wounds or ulcerations. No drainage, no foul odor, no purulent drainage. Bony prominence is palpable. Applied Cavilon No-sting barrier wipe to form a gentle, breathable, waterproof, transparent coating on the skin. Covered with Allevyn Foam bordered dressing for padded protection.    ·	Right Heel - Deep Tissue Injury, present on admission. The area measures approximately 2.0 cm x 4.0 cm x 0.0 cm. Non-blanchable deep red / maroon / purple discoloration. No open wounds or ulcerations. No drainage, no foul odor, no purulent drainage. Bony prominence is palpable. Applied Cavilon No-sting barrier wipe to form a gentle, breathable, waterproof, transparent coating on the skin. Covered with Allevyn Foam bordered dressing for padded protection.  Recommended Complete Cair Offloading Boots to help maintain neutral alignment of the foot and to relieve pressure on the heels, suspending the heels to prevent excessive pressure and skin breakdown. Applied at bed side.  ·	Left Heel - Stage I pressure injury, present on admission.  The area measures approximately 2.0 x 4.0 x 0.0 cm. Surrounding skin: non-blanchable erythema. No open wounds or ulcerations. No drainage, no foul odor, no purulent drainage. Bony prominence is palpable. Applied Cavilon No-sting barrier wipe to form a gentle, breathable, waterproof, transparent coating on the skin. Covered with Allevyn Foam bordered dressing for padded protection. Recommended Complete Cair Offloading Boots to help maintain neutral alignment of the foot and to relieve pressure on the heels, suspending the heels to prevent excessive pressure and skin breakdown. Applied at bed side.    Discussed the importance to RN of close monitoring due to the potential for rapid deterioration and the development of a full-thickness wound, and strategies to completely offload pressure from the affected area. RN in understanding of same.     RN was educated on the importance of turning and positioning every 2 hours. The importance of keeping her skin clean and dry and to offload feet/heels, and optimal nutrition.         When the consultation was completed, the patient was left in a right sided position, with side-rails up and call bell within reach. Discussed plan of care with INGRIS Stewart.     Arrived on 6 TOWER on 5/2/25, patient was found lying in a low air loss pressure redistribution support surface style bed. Patient is awake, but confused. Patient's daughter at bed side. In with INGRIS Stewart. Ms. Livingston was unable to turn independently. Staff assistance X 2 required for turning. Once turned, able to view her skin. Patient with a Pure Wick external catheter for urinary diversion.     Skin examination reveals;  ·	a Stage I pressure injury located on the Sacrum / B/L buttocks, present on admission.  The area measures approximately 6.0 x 6.0 x 0.0 cm. Surrounding skin: non-blanchable erythema. No open wounds or ulcerations. No drainage, no foul odor, no purulent drainage. Bony prominence is palpable. Applied Cavilon No-sting barrier wipe to form a gentle, breathable, waterproof, transparent coating on the skin. Covered with Allevyn Foam bordered dressing for padded protection.  Recommended Ben-Lock positioner pillow, providing a stable, contouring base for optimal support.   ·	Right Heel - Deep Tissue Injury, present on admission. The area measures approximately 2.0 cm x 4.0 cm x 0.0 cm. Non-blanchable deep red / maroon / purple discoloration. No open wounds or ulcerations. No drainage, no foul odor, no purulent drainage. Bony prominence is palpable. Applied Cavilon No-sting barrier wipe to form a gentle, breathable, waterproof, transparent coating on the skin. Covered with Allevyn Foam bordered dressing for padded protection.  Recommended Complete Cair Offloading Boots to help maintain neutral alignment of the foot and to relieve pressure on the heels, suspending the heels to prevent excessive pressure and skin breakdown. Applied at bed side.  ·	Left Heel - Stage I pressure injury, present on admission.  The area measures approximately 2.0 x 4.0 x 0.0 cm. Surrounding skin: non-blanchable erythema. No open wounds or ulcerations. No drainage, no foul odor, no purulent drainage. Bony prominence is palpable. Applied Cavilon No-sting barrier wipe to form a gentle, breathable, waterproof, transparent coating on the skin. Covered with Allevyn Foam bordered dressing for padded protection. Recommended Complete Cair Offloading Boots to help maintain neutral alignment of the foot and to relieve pressure on the heels, suspending the heels to prevent excessive pressure and skin breakdown. Applied at bed side.    Discussed the importance to RN of close monitoring due to the potential for rapid deterioration and the development of a full-thickness wound, and strategies to completely offload pressure from the affected area. RN in understanding of same.     RN was educated on the importance of turning and positioning every 2 hours. The importance of keeping her skin clean and dry and to offload feet/heels, and optimal nutrition.         When the consultation was completed, the patient was left in a right sided position, with side-rails up and call bell within reach. Discussed plan of care with INGRIS Stewart.

## 2025-05-02 NOTE — DIETITIAN INITIAL EVALUATION ADULT - NSFNSNUTRHOMESUPPLEMENTFT_GEN_A_CORE
Vitamin C, MVI per PTA meds list Vitamin C, MVI, per daughter states had another unknown prior supplement that has since been discontinued PTA

## 2025-05-02 NOTE — PATIENT PROFILE ADULT - NSFALLSECTIONLABEL_GEN_A_CORE
. Arava Pregnancy And Lactation Text: This medication is Pregnancy Category X and is absolutely contraindicated during pregnancy. It is unknown if it is excreted in breast milk.

## 2025-05-02 NOTE — CONSULT NOTE ADULT - SUBJECTIVE AND OBJECTIVE BOX
Chief Complaint:  Patient is a 89y old  Female who presents with a chief complaint of uti/ change of mental status (02 May 2025 13:00)      Date of service: 05-02-25 @ 22:18    HPI:    The patient is a 89 F w hx of CVA s/p PEG presenting with change in mental status. She is diagnosed with UTI.    The patient does not participate in the history.    PEr son at bedside PEG was placed 6 mo ago but is not working optimally    Allergies:  cefazolin (Flushing)      Home Medications:    Hospital Medications:  acetaminophen   IVPB .. 1000 milliGRAM(s) IV Intermittent once  amLODIPine   Tablet 10 milliGRAM(s) Oral daily  artificial  tears Solution 1 Drop(s) Both EYES two times a day  aspirin  chewable 81 milliGRAM(s) Oral daily  atorvastatin 80 milliGRAM(s) Oral at bedtime  bisacodyl Suppository 10 milliGRAM(s) Rectal daily PRN  dextrose 5%. 1000 milliLiter(s) IV Continuous <Continuous>  dextrose 5%. 1000 milliLiter(s) IV Continuous <Continuous>  dextrose 50% Injectable 25 Gram(s) IV Push once  dextrose 50% Injectable 12.5 Gram(s) IV Push once  dextrose 50% Injectable 25 Gram(s) IV Push once  dextrose Oral Gel 15 Gram(s) Oral once PRN  ertapenem  IVPB 1000 milliGRAM(s) IV Intermittent every 24 hours  famotidine    Tablet 20 milliGRAM(s) Oral daily  glucagon  Injectable 1 milliGRAM(s) IntraMuscular once  heparin   Injectable 5000 Unit(s) SubCutaneous every 12 hours  insulin lispro (ADMELOG) corrective regimen sliding scale   SubCutaneous every 6 hours  levothyroxine 88 MICROGram(s) Oral daily  losartan 50 milliGRAM(s) Oral daily  melatonin 3 milliGRAM(s) Oral once  metoprolol tartrate 25 milliGRAM(s) Enteral Tube two times a day  multivitamin 1 Tablet(s) Oral daily  polyethylene glycol 3350 17 Gram(s) Oral daily  senna 2 Tablet(s) Oral at bedtime PRN      PMHX/PSHX:  Arthritis    Asthma    Malignant neoplasm of unspecified site of right female breast    CVA (cerebrovascular accident)    Hypothyroidism    Preventive measure    Dysphagia    Hypertension    S/P hysterectomy    H/O right breast biopsy        Family history:  No pertinent family history in first degree relatives        Social History:   Denies ethanol use.  Denies illicit drug use.    ROS:     General:  No wt loss, fevers, chills, night sweats, fatigue,   Eyes:  Good vision, no reported pain  ENT:  No sore throat, pain, runny nose, dysphagia  CV:  No pain, palpitations, hypo/hypertension  Resp:  No dyspnea, cough, tachypnea, wheezing  GI:  See HPI  :  No pain, bleeding, incontinence, nocturia  Muscle:  No pain, weakness  Neuro:  No weakness, tingling, memory problems  Psych:  No fatigue, insomnia, mood problems, depression  Endocrine:  No polyuria, polydipsia, cold/heat intolerance  Heme:  No petechiae, ecchymosis, easy bruisability  Integumentary:  No rash, edema      PHYSICAL EXAM:     GENERAL:  Appears stated age, well-groomed, well-nourished, no distress  HEENT:  NC/AT,  conjunctivae anicteric, clear and pink,   NECK: supple, trachea midline  CHEST:  Full & symmetric excursion, no increased effort, breath sounds clear  HEART:  Regular rhythm, no JVD  ABDOMEN:  Soft, non-tender, non-distended, normoactive bowel sounds,  no masses , no hepatosplenomegaly  EXTREMITIES:  no cyanosis,clubbing or edema  SKIN:  No rash, erythema, or, ecchymoses, no jaundice  NEURO:  Alert, non-focal, no asterixis  PSYCH: Appropriate affect, oriented to place and time  RECTAL: Deferred      Vital Signs:  Vital Signs Last 24 Hrs  T(C): 36.6 (02 May 2025 21:10), Max: 37.1 (02 May 2025 03:31)  T(F): 97.9 (02 May 2025 21:10), Max: 98.8 (02 May 2025 03:31)  HR: 86 (02 May 2025 21:10) (86 - 103)  BP: 121/82 (02 May 2025 21:10) (121/82 - 152/80)  BP(mean): --  RR: 18 (02 May 2025 21:10) (18 - 22)  SpO2: 96% (02 May 2025 21:10) (94% - 99%)    Parameters below as of 02 May 2025 21:10  Patient On (Oxygen Delivery Method): mask, nonrebreather      Daily     Daily     LABS: Labs personally reviewed by me:                        13.1   9.99  )-----------( 218      ( 02 May 2025 02:07 )             40.3     05-02    139  |  102  |  21  ----------------------------<  115[H]  3.9   |  23  |  0.55    Ca    9.7      02 May 2025 02:07  Phos  2.9     05-02  Mg     2.1     05-02    TPro  6.9  /  Alb  3.3  /  TBili  0.7  /  DBili  x   /  AST  12  /  ALT  10  /  AlkPhos  104  05-02    LIVER FUNCTIONS - ( 02 May 2025 02:07 )  Alb: 3.3 g/dL / Pro: 6.9 g/dL / ALK PHOS: 104 U/L / ALT: 10 U/L / AST: 12 U/L / GGT: x           PT/INR - ( 02 May 2025 02:07 )   PT: 12.9 sec;   INR: 1.12 ratio         PTT - ( 02 May 2025 02:07 )  PTT:26.5 sec  Urinalysis Basic - ( 02 May 2025 02:07 )    Color: x / Appearance: x / SG: x / pH: x  Gluc: 115 mg/dL / Ketone: x  / Bili: x / Urobili: x   Blood: x / Protein: x / Nitrite: x   Leuk Esterase: x / RBC: x / WBC x   Sq Epi: x / Non Sq Epi: x / Bacteria: x          Imaging personally reviewed by me:          
 89 f with HTN, hypothyroidism dementia, prolong hospitalization 10/2024-12/2024 for CVA with R sided weakness and dysphagia s/p PEG (but at home family feeds her as well), fall and ankle fx 2/2025 s/p Percutaneous repair of right ankle joint 2/12, now brought in by family for AMS, hallucination and her temp was 99 but no change in resp status, no diarrhea  afebrile, HR in 80-90s, no hypoxia  CXR clear  head CT no acute infarct or hemorrhage  u/a with pyuria   pt was started on ceftriaxone, now appears erythematous, I asked the son if this is her normal color he said yes, then said may be she is more red now          PAST MEDICAL & SURGICAL HISTORY:  Arthritis      Asthma      Malignant neoplasm of unspecified site of right female breast      CVA (cerebrovascular accident)      Hypothyroidism      Dysphagia      Hypertension      S/P hysterectomy  40 years ago      H/O right breast biopsy  12/2019          Allergies    cefazolin (Flushing)    Intolerances        ANTIMICROBIALS:  ertapenem  IVPB 1000 every 24 hours      OTHER MEDS:  acetaminophen   IVPB .. 1000 milliGRAM(s) IV Intermittent once  albuterol    0.083%. 2.5 milliGRAM(s) Nebulizer once  amLODIPine   Tablet 10 milliGRAM(s) Oral daily  artificial  tears Solution 1 Drop(s) Both EYES two times a day  aspirin  chewable 81 milliGRAM(s) Oral daily  atorvastatin 80 milliGRAM(s) Oral at bedtime  bisacodyl Suppository 10 milliGRAM(s) Rectal daily PRN  dextrose 5%. 1000 milliLiter(s) IV Continuous <Continuous>  dextrose 5%. 1000 milliLiter(s) IV Continuous <Continuous>  dextrose 50% Injectable 25 Gram(s) IV Push once  dextrose 50% Injectable 12.5 Gram(s) IV Push once  dextrose 50% Injectable 25 Gram(s) IV Push once  dextrose Oral Gel 15 Gram(s) Oral once PRN  famotidine    Tablet 20 milliGRAM(s) Oral daily  glucagon  Injectable 1 milliGRAM(s) IntraMuscular once  heparin   Injectable 5000 Unit(s) SubCutaneous every 12 hours  insulin lispro (ADMELOG) corrective regimen sliding scale   SubCutaneous every 6 hours  levothyroxine 88 MICROGram(s) Oral daily  losartan 50 milliGRAM(s) Oral daily  melatonin 3 milliGRAM(s) Oral once  metoprolol tartrate 25 milliGRAM(s) Enteral Tube two times a day  multivitamin 1 Tablet(s) Oral daily  polyethylene glycol 3350 17 Gram(s) Oral daily  senna 2 Tablet(s) Oral at bedtime PRN      SOCIAL HISTORY:  from [place], lives at home with son  no smoking, alcohol or drug abuse  no recent travel    FAMILY HISTORY:  no recent febrile illness in family members    ROS:  Unobtainable because of mental status    Physical Exam:    General:    NAD, non toxic  Head: atraumatic, normocephalic  Eyes: normal sclera and conjunctiva  ENT:   no oropharyngeal lesions, neck supple  Cardio:   slightly tachy regular   Respiratory:   clear b/l, no wheezing  abd:   soft, PEG, not tender  :     no CVAT, no suprapubic tenderness, no flores  Musculoskeletal : no joint swelling  Skin:  erythema of face, neck, torso and UEs  vascular: no phlebitis  Neurologic:   awake but did not answer the questions        Drug Dosing Weight  Height (cm): 157.5 (12 Feb 2025 12:51)  Weight (kg): 54.4 (12 Feb 2025 12:51)  BMI (kg/m2): 21.9 (12 Feb 2025 12:51)  BSA (m2): 1.54 (12 Feb 2025 12:51)    Vital Signs Last 24 Hrs  T(F): 97.9 (05-02-25 @ 10:58), Max: 98.8 (05-02-25 @ 03:31)    Vital Signs Last 24 Hrs  HR: 97 (05-02-25 @ 10:58) (88 - 98)  BP: 149/86 (05-02-25 @ 10:58) (134/80 - 152/80)  RR: 18 (05-02-25 @ 10:58)  SpO2: 94% (05-02-25 @ 10:58) (94% - 99%)  Wt(kg): --                          13.1   9.99  )-----------( 218      ( 02 May 2025 02:07 )             40.3       05-02    139  |  102  |  21  ----------------------------<  115[H]  3.9   |  23  |  0.55    Ca    9.7      02 May 2025 02:07  Phos  2.9     05-02  Mg     2.1     05-02    TPro  6.9  /  Alb  3.3  /  TBili  0.7  /  DBili  x   /  AST  12  /  ALT  10  /  AlkPhos  104  05-02      Urinalysis Basic - ( 02 May 2025 02:07 )    Color: x / Appearance: x / SG: x / pH: x  Gluc: 115 mg/dL / Ketone: x  / Bili: x / Urobili: x   Blood: x / Protein: x / Nitrite: x   Leuk Esterase: x / RBC: x / WBC x   Sq Epi: x / Non Sq Epi: x / Bacteria: x        MICROBIOLOGY:  v              RADIOLOGY:    Images independently visualized and reviewed personally,  findings as below    < from: CT Head No Cont (05.02.25 @ 02:43) >  IMPRESSION:  No acute intracranial hemorrhage, mass effect, or midline shift.    < end of copied text >  < from: Xray Chest 1 View- PORTABLE-Urgent (05.02.25 @ 01:46) >  The heart is normal in size.  No focal consolidation. Right greater than left apical pleural thickening.  There is no pneumothorax or pleural effusion.  No acute bony abnormality. Kyphoplasty cement in the lower thoracic/upper   lumbar spine. Partially imaged gastrostomy balloon.    IMPRESSION:  Clear lungs.    < end of copied text >  
CHIEF COMPLAINT:  Asked by family to take over her medical care.   HISTORY OF PRESENT ILLNESS:  89-year-old female past medical history of hypertension, dementia, prior strokes with residual right sided hemiplegia, dysphagia s/p PEG,  presented w/ altered mental status and fevers at home x 3 days.  Patient has a history of dementia, but normally conversive, more lethargic recently.  Episodes of hallucinations.  Fevers particularly at night noted.  Patient usually without any complaints, but has been having a mild cough.  No other issues noted by family at this time.        PAST MEDICAL & SURGICAL HISTORY:  Arthritis      Asthma      Malignant neoplasm of unspecified site of right female breast      CVA (cerebrovascular accident)      Hypothyroidism      Dysphagia      Hypertension      S/P hysterectomy  40 years ago      H/O right breast biopsy  12/2019              MEDICATIONS:  amLODIPine   Tablet 10 milliGRAM(s) Oral daily  heparin   Injectable 5000 Unit(s) SubCutaneous every 12 hours  losartan 50 milliGRAM(s) Oral daily  metoprolol tartrate 25 milliGRAM(s) Enteral Tube two times a day    cefTRIAXone   IVPB 1000 milliGRAM(s) IV Intermittent every 24 hours    albuterol    0.083%. 2.5 milliGRAM(s) Nebulizer once    acetaminophen   IVPB .. 1000 milliGRAM(s) IV Intermittent once  melatonin 3 milliGRAM(s) Oral once    bisacodyl Suppository 10 milliGRAM(s) Rectal daily PRN  famotidine    Tablet 20 milliGRAM(s) Oral daily  polyethylene glycol 3350 17 Gram(s) Oral daily  senna 2 Tablet(s) Oral at bedtime PRN    atorvastatin 80 milliGRAM(s) Oral at bedtime  dextrose 50% Injectable 25 Gram(s) IV Push once  dextrose 50% Injectable 12.5 Gram(s) IV Push once  dextrose 50% Injectable 25 Gram(s) IV Push once  dextrose Oral Gel 15 Gram(s) Oral once PRN  glucagon  Injectable 1 milliGRAM(s) IntraMuscular once  insulin lispro (ADMELOG) corrective regimen sliding scale   SubCutaneous every 6 hours  levothyroxine 88 MICROGram(s) Oral daily    artificial  tears Solution 1 Drop(s) Both EYES two times a day  dextrose 5%. 1000 milliLiter(s) IV Continuous <Continuous>  dextrose 5%. 1000 milliLiter(s) IV Continuous <Continuous>  multivitamin 1 Tablet(s) Oral daily      FAMILY HISTORY:      SOCIAL HISTORY:    [ ] Non-smoker  [ ] Smoker  [ ] Alcohol    Allergies    cefazolin (Flushing)    Intolerances    	    REVIEW OF SYSTEMS:  CONSTITUTIONAL:+ fever, weight loss, +fatigue  EYES: No eye pain, visual disturbances, or discharge  ENMT:  No difficulty hearing, tinnitus, vertigo; No sinus or throat pain  NECK: No pain or stiffness  RESPIRATORY: No cough, wheezing, chills or hemoptysis; No Shortness of Breath  CARDIOVASCULAR: No chest pain, palpitations, passing out, dizziness, or leg swelling  GASTROINTESTINAL: No abdominal or epigastric pain. No nausea, vomiting, or hematemesis; No diarrhea or constipation. No melena or hematochezia.  GENITOURINARY: No dysuria, frequency, hematuria, or incontinence  NEUROLOGICAL: No headaches, memory loss, loss of strength, numbness, or tremors  SKIN: No itching, burning, rashes, or lesions   LYMPH Nodes: No enlarged glands  ENDOCRINE: No heat or cold intolerance; No hair loss  MUSCULOSKELETAL: No joint pain or swelling; No muscle, back, or extremity pain  PSYCHIATRIC: No depression, anxiety, mood swings, or difficulty sleeping  HEME/LYMPH: No easy bruising, or bleeding gums  ALLERY AND IMMUNOLOGIC: No hives or eczema	    [ ] All others negative	  [ ] Unable to obtain    PHYSICAL EXAM:  T(C): 36.4 (05-02-25 @ 04:25), Max: 37.1 (05-02-25 @ 03:31)  HR: 98 (05-02-25 @ 04:25) (88 - 98)  BP: 152/80 (05-02-25 @ 04:25) (134/80 - 152/80)  RR: 18 (05-02-25 @ 04:25) (18 - 22)  SpO2: 94% (05-02-25 @ 04:25) (94% - 99%)  Wt(kg): --  I&O's Summary      Appearance: NAD elderly 	  HEENT:   Normal oral mucosa, PERRL, EOMI	  Lymphatic: No lymphadenopathy  Cardiovascular: Normal S1 S2, No JVD, No murmurs, No edema  Respiratory: decreased bs 	  Psychiatry: A & O x 3, Mood & affect appropriate  Gastrointestinal:  Soft, Non-tender, + PEG 	  Skin: No rashes, No ecchymoses, No cyanosis	  Neurologic: Right sided hemiplegia   Extremities: Normal range of motion, No clubbing, cyanosis or edema  Vascular: Peripheral pulses palpable 2+ bilaterally    TELEMETRY: SR 	    ECG:  	  RADIOLOGY:  < from: CT Head No Cont (05.02.25 @ 02:43) >    ACC: [id number] EXAM:  CT BRAIN   ORDERED BY:  JAMES MENDOZA     PROCEDURE DATE:  05/02/2025          INTERPRETATION:  CLINICAL INFORMATION: AMS    COMPARISON: CT head 2/12/2025    CONTRAST:  IV Contrast: NONE  .    TECHNIQUE:  Serial axial images were obtained from the skull base to the   vertex using multi-slice helical technique. Sagittal and coronal   reformats were obtained.    FINDINGS:    VENTRICLES AND SULCI: Age appropriate involutional changes.  INTRA-AXIAL: No mass effect, acute hemorrhage, or midline shift.    Extensive periventricular and subcortical white matter hypoattenuation   without mass effect is noted, non-specific, but likely related to chronic   small vessel ischemic changes. Chronic bilateral thalamic lacunar   infarcts.  EXTRA-AXIAL: No mass or fluid collection. Basal cisterns are normal in   appearance.    VISUALIZED SINUSES:  Clear.  TYMPANOMASTOID CAVITIES:  Clear.  VISUALIZED ORBITS: Bilateral lens replacement.  CALVARIUM: Hyperostosis frontalis interna.    MISCELLANEOUS: None.      IMPRESSION:  No acute intracranial hemorrhage, mass effect, or midline shift.        --- End of Report ---            XENA CHEN MD; Attending Radiologist  This document has been electronically signed. May  2 2025  3:09AM    < end of copied text >    OTHER: 	  	  LABS:	 	    CARDIAC MARKERS:          Urinalysis + Microscopic Examination (05.02.25 @ 02:06)   pH Urine: 7.5  Urine Appearance: Cloudy  Color: Yellow  Specific Gravity: 1.012  Protein, Urine: Trace mg/dL  Glucose Qualitative, Urine: Negative mg/dL  Ketone - Urine: Negative mg/dL  Blood, Urine: Trace  Bilirubin: Negative  Urobilinogen: 1.0 mg/dL  Leukocyte Esterase Concentration: Large  Nitrite: Negative  White Blood Cell - Urine: 202 /HPF  Red Blood Cell - Urine: 2 /HPF  Bacteria: Occasional /HPF  Cast: 2 /LPF  Epithelial Cells: 2 /HPF                        13.1   9.99  )-----------( 218      ( 02 May 2025 02:07 )             40.3     05-02    139  |  102  |  21  ----------------------------<  115[H]  3.9   |  23  |  0.55    Ca    9.7      02 May 2025 02:07  Phos  2.9     05-02  Mg     2.1     05-02    TPro  6.9  /  Alb  3.3  /  TBili  0.7  /  DBili  x   /  AST  12  /  ALT  10  /  AlkPhos  104  05-02    proBNP:   Lipid Profile:   HgA1c:   TSH: Thyroid Stimulating Hormone, Serum: 4.27 uIU/mL (05-02 @ 02:07)

## 2025-05-02 NOTE — CONSULT NOTE ADULT - ASSESSMENT
89  F w urosepsis, GI consulted for PEG malfunction, plan for PEG exchange on Sunday
89 f with HTN, dementia, prolong hospitalization 10/2024-12/2024 for CVA with R sided weakness and dysphagia s/p PEG (but at home family feeds her as well), fall and ankle fx 2/2025 s/p Percutaneous repair of right ankle joint 2/12, now brought in by family for AMS, hallucination and her temp was 99 but no change in resp status, no diarrhea  afebrile, HR in 80-90s, no hypoxia  CXR clear  head CT no acute infarct or hemorrhage  u/a with pyuria   pt was started on ceftriaxone, now appears erythematous, I asked the son if this is her normal color he said yes, then said may be she is more red now    AMS, positive u/a, ?UTI, unclear if pt symptomatic but no significant cough, vomiting, diarrhea and no fever or WBC  erythema, likely due to ceftriaxone as pt has ?allergy to cefazolin    * follow the blood and urine cx  * discontinue ceftriaxone and start ertapenem 1 qd as previous urine cx showed ESBL E-coli  * if worsening status will need abd/pelvis CT with contrast  * monitor CBC/diff and CMP    The above assessment and plan was discussed with the primary team    Shy Alvares MD  contact on teams  After 5pm and on weekends call [phone]
89-year-old female past medical history of hypertension, dementia, prior strokes with residual right sided hemiplegia, dysphagia s/p PEG,  presented w/ altered mental status and fevers at home x 3 days.   +UTI

## 2025-05-02 NOTE — PATIENT PROFILE ADULT - NSPROGENBLOODRESTRICT_GEN_A_NUR
Toothache over last couple weeks. Has been taking tylenol, GF thinks he took too much. Called poison control and they told him to come in to be seen. States he broke his mandible a few years ago on the same side so ws unsure if its his jaw or tooth. Admitted to drinking ETOH and smoking marijuana tonight.
none

## 2025-05-02 NOTE — DIETITIAN INITIAL EVALUATION ADULT - PERSON TAUGHT/METHOD
current TF regimen, progression of TF regimens in relation to potential PO diet if cleared to take any degree of PO diet, answered questions regarding what would be pathway of patient weaning bolus TFs and eventually having TFs stopped (at family request) to transition to only PO diet, all questions were answered/verbal instruction/teach back - (Patient repeats in own words)/daughter instructed

## 2025-05-02 NOTE — CONSULT NOTE ADULT - PROBLEM/RECOMMENDATION-4
no chest pain/no dyspnea on exertion/no palpitations/no peripheral edema/no claudication DISPLAY PLAN FREE TEXT

## 2025-05-02 NOTE — ADVANCED PRACTICE NURSE CONSULT - REASON FOR CONSULT
Wound consult requested to assess skin status; Sacrum / B/L buttocks - Suspected DTI, POA    HPI:  89-year-old female past medical history of hypertension, dementia, prior strokes presenting with chief complaint of episodes of altered mental status and fevers at home. Patient has a history of dementia, but normally conversive, more lethargic recently.  Episodes of hallucinations. Fevers particularly at night noted.  Patient usually without any complaints, but has been having a mild cough.  No other issues noted by family at this time.  pt with sig cardiac and medical hx with CVA,with long hospitalization sec to CVA with change of mental status and fever

## 2025-05-02 NOTE — H&P ADULT - HISTORY OF PRESENT ILLNESS
Date of Service, 05-02-25 @ 10:13  CHIEF COMPLAINT:Patient is a 89y old  Female who presents with a chief complaint of     HPI:   89-year-old female past medical history of hypertension, dementia, prior strokes presenting with chief complaint of episodes of altered mental status and fevers at home.  Patient has a history of dementia, but normally conversive, more lethargic recently.  Episodes of hallucinations.  Fevers particularly at night noted.  Patient usually without any complaints, but has been having a mild cough.  No other issues noted by family at this time.    PAST MEDICAL & SURGICAL HISTORY:  Arthritis  Asthma  Malignant neoplasm of unspecified site of right female breast  CVA (cerebrovascular accident)  Hypothyroidism  Dysphagia  Hypertension  S/P hysterectomy  40 years ago  H/O right breast biopsy  12/2019        MEDICATIONS  (STANDING):  acetaminophen   IVPB .. 1000 milliGRAM(s) IV Intermittent once  dextrose 5%. 1000 milliLiter(s) (50 mL/Hr) IV Continuous <Continuous>  dextrose 5%. 1000 milliLiter(s) (100 mL/Hr) IV Continuous <Continuous>  dextrose 50% Injectable 25 Gram(s) IV Push once  dextrose 50% Injectable 12.5 Gram(s) IV Push once  dextrose 50% Injectable 25 Gram(s) IV Push once  glucagon  Injectable 1 milliGRAM(s) IntraMuscular once  insulin lispro (ADMELOG) corrective regimen sliding scale   SubCutaneous every 6 hours  melatonin 3 milliGRAM(s) Oral once    MEDICATIONS  (PRN):  dextrose Oral Gel 15 Gram(s) Oral once PRN Blood Glucose LESS THAN 70 milliGRAM(s)/deciliter      FAMILY HISTORY:      SOCIAL HISTORY:    [x ] Non-smoker  [ ] Smoker  [ ] Alcohol    Allergies    cefazolin ([place])    Intolerances    	    REVIEW OF SYSTEMS:  CONSTITUTIONAL: No fever, weight loss, or fatigue  EYES: No eye pain, visual disturbances, or discharge  ENT:  No difficulty hearing, tinnitus, vertigo; No sinus or throat pain  NECK: No pain or stiffness  RESPIRATORY: No cough, wheezing, chills or hemoptysis; + Shortness of Breath  CARDIOVASCULAR: No chest pain, palpitations, passing out, dizziness, or leg swelling  GASTROINTESTINAL: No abdominal or epigastric pain. No nausea, vomiting, or hematemesis; No diarrhea or constipation. No melena or hematochezia.  GENITOURINARY: No dysuria, frequency, hematuria, or incontinence  NEUROLOGICAL: No headaches, memory loss, loss of strength, numbness, or tremors  SKIN: No itching, burning, rashes, or lesions   LYMPH Nodes: No enlarged glands  ENDOCRINE: No heat or cold intolerance; No hair loss  MUSCULOSKELETAL: No joint pain or swelling; No muscle, back, or extremity pain  PSYCHIATRIC: No depression, anxiety, mood swings, or difficulty sleeping  HEME/LYMPH: No easy bruising, or bleeding gums  ALLERGY AND IMMUNOLOGIC: No hives or eczema	    [x ] All others negative	  [ ] Unable to obtain    PHYSICAL EXAM:  T(C): 36.4 (05-02-25 @ 04:25), Max: 37.1 (05-02-25 @ 03:31)  HR: 98 (05-02-25 @ 04:25) (88 - 98)  BP: 152/80 (05-02-25 @ 04:25) (134/80 - 152/80)  RR: 18 (05-02-25 @ 04:25) (18 - 22)  SpO2: 94% (05-02-25 @ 04:25) (94% - 99%)  Wt(kg): --  I&O's Summary      Appearance: Normal	  HEENT:   Normal oral mucosa, PERRL, EOMI	  Lymphatic: No lymphadenopathy  Cardiovascular: Normal S1 S2, No JVD, + murmurs, No edema  Respiratory rhonchi  Gastrointestinal:  Soft, Non-tender, + BS	  Skin: No rashes, No ecchymoses, No cyanosis	  Extremities: Normal range of motion, No clubbing, cyanosis or edema  Vascular: Peripheral pulses palpable 2+ bilaterally    TELEMETRY: 	    ECG:  	  RADIOLOGY:  OTHER: 	  	  LABS:	 	    CARDIAC MARKERS:                          13.1   9.99  )-----------( 218      ( 02 May 2025 02:07 )             40.3     05-02    139  |  102  |  21  ----------------------------<  115[H]  3.9   |  23  |  0.55    Ca    9.7      02 May 2025 02:07  Phos  2.9     05-02  Mg     2.1     05-02    TPro  6.9  /  Alb  3.3  /  TBili  0.7  /  DBili  x   /  AST  12  /  ALT  10  /  AlkPhos  104  05-02    proBNP:   Lipid Profile:   HgA1c:   TSH:   PT/INR - ( 02 May 2025 02:07 )   PT: 12.9 sec;   INR: 1.12 ratio         PTT - ( 02 May 2025 02:07 )  PTT:26.5 sec    PREVIOUS DIAGNOSTIC TESTING:    < from: 12 Lead ECG (02.12.25 @ 02:22) >  Diagnosis Line SINUS TACHYCARDIA  LOW VOLTAGE QRS  BORDERLINE ECG  WHEN COMPARED WITH ECG OF 27-NOV-2022 21:13,  NO SIGNIFICANT CHANGE WAS FOUND      < from: Xray Chest 1 View- PORTABLE-Urgent (05.02.25 @ 01:46) >  The heart is normal in size.  No focal consolidation. Right greater than left apical pleural thickening.  There is no pneumothorax or pleural effusion.  No acute bony abnormality. Kyphoplasty cement in the lower thoracic/upper   lumbar spine. Partially imaged gastrostomy balloon.    IMPRESSION:  Clear lungs.    < from: CT Head No Cont (05.02.25 @ 02:43) >  IMPRESSION:  No acute intracranial hemorrhage, mass effect, or midline shift.                 Date of Service, 05-02-25 @ 10:13  CHIEF COMPLAINT:Patient is a 89y old  Female who presents with a chief complaint of     HPI:   89-year-old female past medical history of hypertension, dementia, prior strokes presenting with chief complaint of episodes of altered mental status and fevers at home.  Patient has a history of dementia, but normally conversive, more lethargic recently.  Episodes of hallucinations.  Fevers particularly at night noted.  Patient usually without any complaints, but has been having a mild cough.  No other issues noted by family at this time.  spoke to DR Bess, asked to doi h&p and will see pt afterwards    PAST MEDICAL & SURGICAL HISTORY:  Arthritis  Asthma  Malignant neoplasm of unspecified site of right female breast  CVA (cerebrovascular accident)  Hypothyroidism  Dysphagia  Hypertension  S/P hysterectomy  40 years ago  H/O right breast biopsy  12/2019        MEDICATIONS  (STANDING):  acetaminophen   IVPB .. 1000 milliGRAM(s) IV Intermittent once  dextrose 5%. 1000 milliLiter(s) (50 mL/Hr) IV Continuous <Continuous>  dextrose 5%. 1000 milliLiter(s) (100 mL/Hr) IV Continuous <Continuous>  dextrose 50% Injectable 25 Gram(s) IV Push once  dextrose 50% Injectable 12.5 Gram(s) IV Push once  dextrose 50% Injectable 25 Gram(s) IV Push once  glucagon  Injectable 1 milliGRAM(s) IntraMuscular once  insulin lispro (ADMELOG) corrective regimen sliding scale   SubCutaneous every 6 hours  melatonin 3 milliGRAM(s) Oral once    MEDICATIONS  (PRN):  dextrose Oral Gel 15 Gram(s) Oral once PRN Blood Glucose LESS THAN 70 milliGRAM(s)/deciliter      FAMILY HISTORY:      SOCIAL HISTORY:    [x ] Non-smoker  [ ] Smoker  [ ] Alcohol    Allergies    cefazolin ([place]ushing)    Intolerances    	    REVIEW OF SYSTEMS:  CONSTITUTIONAL: No fever, weight loss, or fatigue  EYES: No eye pain, visual disturbances, or discharge  ENT:  No difficulty hearing, tinnitus, vertigo; No sinus or throat pain  NECK: No pain or stiffness  RESPIRATORY: No cough, wheezing, chills or hemoptysis; + Shortness of Breath  CARDIOVASCULAR: No chest pain, palpitations, passing out, dizziness, or leg swelling  GASTROINTESTINAL: No abdominal or epigastric pain. No nausea, vomiting, or hematemesis; No diarrhea or constipation. No melena or hematochezia.  GENITOURINARY: No dysuria, frequency, hematuria, or incontinence  NEUROLOGICAL: No headaches, memory loss, loss of strength, numbness, or tremors  SKIN: No itching, burning, rashes, or lesions   LYMPH Nodes: No enlarged glands  ENDOCRINE: No heat or cold intolerance; No hair loss  MUSCULOSKELETAL: No joint pain or swelling; No muscle, back, or extremity pain  PSYCHIATRIC: No depression, anxiety, mood swings, or difficulty sleeping  HEME/LYMPH: No easy bruising, or bleeding gums  ALLERGY AND IMMUNOLOGIC: No hives or eczema	    [x ] All others negative	  [ ] Unable to obtain    PHYSICAL EXAM:  T(C): 36.4 (05-02-25 @ 04:25), Max: 37.1 (05-02-25 @ 03:31)  HR: 98 (05-02-25 @ 04:25) (88 - 98)  BP: 152/80 (05-02-25 @ 04:25) (134/80 - 152/80)  RR: 18 (05-02-25 @ 04:25) (18 - 22)  SpO2: 94% (05-02-25 @ 04:25) (94% - 99%)  Wt(kg): --  I&O's Summary      Appearance: Normal	  HEENT:   Normal oral mucosa, PERRL, EOMI	  Lymphatic: No lymphadenopathy  Cardiovascular: Normal S1 S2, No JVD, + murmurs, No edema  Respiratory rhonchi  Gastrointestinal:  Soft, Non-tender, + BS, peg	  Skin: No rashes, No ecchymoses, No cyanosis	  Extremities: Normal range of motion, No clubbing, cyanosis or edema  Vascular: Peripheral pulses palpable 2+ bilaterally    TELEMETRY: 	    ECG:  	  RADIOLOGY:  OTHER: 	  	  LABS:	 	    CARDIAC MARKERS:                          13.1   9.99  )-----------( 218      ( 02 May 2025 02:07 )             40.3     05-02    139  |  102  |  21  ----------------------------<  115[H]  3.9   |  23  |  0.55    Ca    9.7      02 May 2025 02:07  Phos  2.9     05-02  Mg     2.1     05-02    TPro  6.9  /  Alb  3.3  /  TBili  0.7  /  DBili  x   /  AST  12  /  ALT  10  /  AlkPhos  104  05-02    proBNP:   Lipid Profile:   HgA1c:   TSH:   PT/INR - ( 02 May 2025 02:07 )   PT: 12.9 sec;   INR: 1.12 ratio         PTT - ( 02 May 2025 02:07 )  PTT:26.5 sec    PREVIOUS DIAGNOSTIC TESTING:    < from: 12 Lead ECG (02.12.25 @ 02:22) >  Diagnosis Line SINUS TACHYCARDIA  LOW VOLTAGE QRS  BORDERLINE ECG  WHEN COMPARED WITH ECG OF 27-NOV-2022 21:13,  NO SIGNIFICANT CHANGE WAS FOUND      < from: Xray Chest 1 View- PORTABLE-Urgent (05.02.25 @ 01:46) >  The heart is normal in size.  No focal consolidation. Right greater than left apical pleural thickening.  There is no pneumothorax or pleural effusion.  No acute bony abnormality. Kyphoplasty cement in the lower thoracic/upper   lumbar spine. Partially imaged gastrostomy balloon.    IMPRESSION:  Clear lungs.    < from: CT Head No Cont (05.02.25 @ 02:43) >  IMPRESSION:  No acute intracranial hemorrhage, mass effect, or midline shift.

## 2025-05-02 NOTE — H&P ADULT - NSHPLABSRESULTS_GEN_ALL_CORE
Urinalysis + Microscopic Examination (05.02.25 @ 02:06)    pH Urine: 7.5    Urine Appearance: Cloudy    Color: Yellow    Specific Gravity: 1.012    Protein, Urine: Trace mg/dL    Glucose Qualitative, Urine: Negative mg/dL    Ketone - Urine: Negative mg/dL    Blood, Urine: Trace    Bilirubin: Negative    Urobilinogen: 1.0 mg/dL    Leukocyte Esterase Concentration: Large    Nitrite: Negative    White Blood Cell - Urine: 202 /HPF    Red Blood Cell - Urine: 2 /HPF    Bacteria: Occasional /HPF    Cast: 2 /LPF    Epithelial Cells: 2 /HPF  continue blood sugar  continue bp meds

## 2025-05-02 NOTE — ADVANCED PRACTICE NURSE CONSULT - RECOMMEDATIONS
Impression    Urinary incontinent  Sacrum / Buttocks - Stage I POA  Left Heel - Stage I POA  Right Heel - DTI POA     1. Sacrum / Buttocks - Stage I POA  Topical therapy: Cleanse with incontinence cleanser, pat dry, and apply Cavilon, cover with Allevyn foam border dressing for padded protection. Frequency: Daily & PRN Soiling.    2. Right & Left Heel   Topical therapy: apply Cavilon, cover with Allevyn foam border dressing for padded protection. Frequency: Daily & PRN Soiling.  Elevate heels; apply Complete Cair air fluidized boots; ensure that the soles of the feet are not resting on the foot board of the bed.      3. Incontinent management - incontinent cleanser, pads, lucien care BID            4. Maintain on an alternating air with low air loss surface             5. Turn & reposition every 2 hr; Use positioning pillow to turn and reposition, soft pillow between bony prominences; continue measures to decrease friction/shear/pressure.            6. Nutrition optimization.            7. Place waffle cushion when out of bed to chair

## 2025-05-02 NOTE — DIETITIAN INITIAL EVALUATION ADULT - REASON FOR ADMISSION
Per chart, patient is a 89 y/o female with PMH including HTN, dementia, h/o prior strokes, h/o dysphagia (s/p PEG), hypothyroidism. Patient presents to [facility] w/ AMS, fevers at home.

## 2025-05-02 NOTE — DIETITIAN INITIAL EVALUATION ADULT - PERTINENT MEDS FT
Psychotherapy Group Note    PATIENT'S NAME: Herrera Tyson  MRN:   [id number]  :   1968  ACCT. NUMBER: [id number]  DATE OF SERVICE: 20  START TIME:  2:00 PM  END TIME:  2:50 PM  FACILITATOR: Alena Shrestha LICSW  TOPIC: MH EBP Group: Coping Skills  Adult Mental Health Day Treatment  TRACK: 4B    NUMBER OF PARTICIPANTS: 5    Summary of Group / Topics Discussed:  Coping Skills: Self-Soothe: Patients learned to apply self-soothe as a way to decrease heightened stress in the moment.  Patients identified situations that necessitate self-soothe strategies.  They focused on ways to manage physical symptoms of distress using the senses. They discussed how to distinguish when this can be useful in their lives when other strategies are more relevant or helpful.    Patient Session Goals / Objectives:    Understand the purpose of using the senses to decrease distress    Process what happens in the body when using self-soothe strategies    Demonstrate understanding of when to use self-soothe strategies    Identify and problem solve barriers to applying self-soothe strategies.    Choose 1-2 self-soothe strategies to apply during times of distress.    Telemedicine Visit: The patient's condition can be safely assessed and treated via synchronous audio and visual telemedicine encounter.      Reason for Telemedicine Visit: Services only offered telehealth    Originating Site (Patient Location): Patient's home    Distant Site (Provider Location): Provider Remote Setting    Consent:  The patient/guardian has verbally consented to: the potential risks and benefits of telemedicine (video visit) versus in person care; bill my insurance or make self-payment for services provided; and responsibility for payment of non-covered services.     Mode of Communication:  Video Conference via [employer]    As the provider I attest to compliance with applicable laws and regulations related to telemedicine.       Patient  Participation / Response:  Fully participated with the group by sharing personal reflections / insights and openly received / provided feedback with other participants.    Demonstrated knowledge of when to consider using a variety of coping skills in daily life and Identified barriers to applying coping skills    Treatment Plan:  Patient has a current master individualized treatment plan.  See Epic treatment plan for more information.    Alena Shrestha, LICSW    MEDICATIONS  (STANDING):  acetaminophen   IVPB .. 1000 milliGRAM(s) IV Intermittent once  albuterol    0.083%. 2.5 milliGRAM(s) Nebulizer once  amLODIPine   Tablet 10 milliGRAM(s) Oral daily  artificial  tears Solution 1 Drop(s) Both EYES two times a day  aspirin  chewable 81 milliGRAM(s) Oral daily  atorvastatin 80 milliGRAM(s) Oral at bedtime  cefTRIAXone   IVPB 1000 milliGRAM(s) IV Intermittent every 24 hours  dextrose 5%. 1000 milliLiter(s) (100 mL/Hr) IV Continuous <Continuous>  dextrose 5%. 1000 milliLiter(s) (50 mL/Hr) IV Continuous <Continuous>  dextrose 50% Injectable 25 Gram(s) IV Push once  dextrose 50% Injectable 12.5 Gram(s) IV Push once  dextrose 50% Injectable 25 Gram(s) IV Push once  famotidine    Tablet 20 milliGRAM(s) Oral daily  glucagon  Injectable 1 milliGRAM(s) IntraMuscular once  heparin   Injectable 5000 Unit(s) SubCutaneous every 12 hours  insulin lispro (ADMELOG) corrective regimen sliding scale   SubCutaneous every 6 hours  levothyroxine 88 MICROGram(s) Oral daily  losartan 50 milliGRAM(s) Oral daily  melatonin 3 milliGRAM(s) Oral once  metoprolol tartrate 25 milliGRAM(s) Enteral Tube two times a day  multivitamin 1 Tablet(s) Oral daily  polyethylene glycol 3350 17 Gram(s) Oral daily    MEDICATIONS  (PRN):  bisacodyl Suppository 10 milliGRAM(s) Rectal daily PRN Constipation  dextrose Oral Gel 15 Gram(s) Oral once PRN Blood Glucose LESS THAN 70 milliGRAM(s)/deciliter  senna 2 Tablet(s) Oral at bedtime PRN Constipation

## 2025-05-02 NOTE — DIETITIAN INITIAL EVALUATION ADULT - NS FNS DIET ORDER
Diet, NPO with Tube Feed:   Tube Feeding Modality: Gastrostomy  Glucerna 1.5 [place] (GLUCERNA1.5RTH)  Total Volume for 24 Hours (mL): 1200  Continuous  Starting Tube Feed Rate {mL per Hour}: 40  Increase Tube Feed Rate by (mL): 10     Every 6 hours  Until Goal Tube Feed Rate (mL per Hour): 50  Tube Feed Duration (in Hours): 24  Tube Feed Start Time: 10:00  Free Water Flush  Bolus   Total Volume per Flush (mL): 100   Frequency: Every 6 Hours    Start Time: 12:00 (05-02-25 @ 08:57)

## 2025-05-02 NOTE — ED PROVIDER NOTE - OBJECTIVE STATEMENT
89-year-old female past medical history of hypertension, dementia, prior strokes presenting with chief complaint of episodes of altered mental status and fevers at home.  Patient has a history of dementia, but normally conversive, more lethargic recently.  Episodes of hallucinations.  Fevers particularly at night noted.  Patient usually without any complaints, but has been having a mild cough.  No other issues noted by family at this time.

## 2025-05-02 NOTE — DIETITIAN INITIAL EVALUATION ADULT - ADD RECOMMEND
1. recommend adjusting TFs of Glucerna 1.5 to keep rate of 50cc/hr but duration as 22 hours (1100cc total volume)      - this will allow for synthroid administration (TF held 1 hour before and after administration)      - regimen will provide 1650kcal, 91g protein, 835cc free H2O daily (28kcal/kg, 1.5g/pro/kg based on BW 59.5kg obtained today)  2. daughter requesting if patient can be seen by SLP and have an MBS to assess if patient can take liquids again (has been having meals of pureed foods at home per daughter and states were anticipating an MBS in the future but was told this had to be done in a hospital), recommend SLP consult if medically able  3. pending outcome of above, if patient were to be cleared for a PO diet, daughter asking what could be done to move patient towards weaning bolus TFs at home and eventually having TFs stopped, can perform calorie count during admission to assess adequacy of PO intake if PO diet were to be advanced  4. as medically able, continue multivitamin as ordered and recommend addition of vitamin C to help further aid in wound healing  5. monitor tolerance of TF regimen, weight trend, electrolytes, blood glucose levels, labs, BMs, wound healing

## 2025-05-02 NOTE — DIETITIAN INITIAL EVALUATION ADULT - ORAL INTAKE PTA/DIET HISTORY
Patient's son reports that patient has taken both a PO diet and bolus TFs at home; had prior c/f aspiration in the past. Defers further discussion with daughter as he reports she knows more information for patient's nutrition management, discussed with daughter at bedside. Daughter states at rehab patient was getting bolus TFs of Glucerna 1.5 four 237cc cans daily. Upon d/c and being at home, daughter states patient takes 3 meals per day with pureed foods and mostly no liquids (will puree items such as eggs, avocado, vegetables, meats). As a result of patient having a degree of PO intake PTA, patient's daughter states family started to scale back bolus TF to only 3 cans per day to encourage patient to eat more. Patient's daughter states they had inquired about if patient can take liquids again and what would be realistic steps in weaning bolus TFs and stopping them entirely and states there were eventual plans for an MBS, but was told this had to be done in a hospital. Patient's son reports that patient has taken both a PO diet and bolus TFs at home; had prior c/f aspiration in the past. Defers further discussion with daughter as he reports she knows more information for patient's nutrition management, discussed with daughter at bedside. Daughter states at rehab patient was getting bolus TFs of Glucerna 1.5 four 237cc cans daily. Upon d/c and being at home, daughter states patient takes 3 meals per day with pureed foods and mostly no liquids (will puree items such as eggs, avocado, vegetables, meats). As a result of patient having a degree of PO intake PTA, patient's daughter states family started to scale back bolus TF to only 3 cans per day to encourage patient to eat more. Patient's daughter states they had inquired about if patient can take liquids again and what would be realistic steps in weaning bolus TFs and stopping them entirely and states there were eventual plans for an MBS, but was told this had to be done in a hospital. Of note, patient's daughter states patient's dentures no longer fit and only has pureed foods by mouth at home.

## 2025-05-02 NOTE — ED ADULT NURSE REASSESSMENT NOTE - NS ED NURSE REASSESS COMMENT FT1
Patient straight cathed for urine using sterile technique. Second RN nAa present to confirm sterility. Explained procedure as it was being done - Pt tolerated procedure well. Sterile specimens collected and sent to lab as ordered. drained aprox 300ml of urine. Comfort and safety provided.

## 2025-05-02 NOTE — H&P ADULT - ASSESSMENT
89-year-old female past medical history of hypertension, dementia, prior strokes presenting with chief complaint of episodes of altered mental status and fevers at home.  Patient has a history of dementia, but normally conversive, more lethargic recently.  Episodes of hallucinations.  Fevers particularly at night noted.  Patient usually without any complaints, but has been having a mild cough.  No other issues noted by family at this time.  pt woith sig cardiac and medical hx with CVA,with long hospitalization sec to CVA with change of mental status and fever  r/o urosepsis  start on iv abx, ceftriaxone check cultures  continue blood sugar  continue bp meds  hx of cva, ct scan noted  dvt prophylaxis  hx of cad, beta blocker as tolerated asa daily  89-year-old female past medical history of hypertension, dementia, prior strokes presenting with chief complaint of episodes of altered mental status and fevers at home.  Patient has a history of dementia, but normally conversive, more lethargic recently.  Episodes of hallucinations.  Fevers particularly at night noted.  Patient usually without any complaints, but has been having a mild cough.  No other issues noted by family at this time.  pt with sig cardiac and medical hx with CVA,with long hospitalization sec to CVA with change of mental status and fever  pt is well known to me who was adfmitted to [facility] more jono 2 months with ICH  r/o urosepsis  start on iv abx, ceftriaxone check cultures  continue blood sugar  continue bp meds  hx of cva, ct scan noted  dvt prophylaxis  hx of cad, beta blocker as tolerated asa daily

## 2025-05-02 NOTE — ED PROVIDER NOTE - CLINICAL SUMMARY MEDICAL DECISION MAKING FREE TEXT BOX
Nahum Kilgore, ED Attendin-year-old female presenting for altered mental status, past medical history as detailed above.  On exam, vital signs stable, no focal exam findings.  Plan for broad workup to look out for metabolic versus infectious etiology.  Will obtain CT of the head to assess for any intracranial process.  Reassess to dispo.

## 2025-05-02 NOTE — DIETITIAN INITIAL EVALUATION ADULT - PERTINENT LABORATORY DATA
05-02    139  |  102  |  21  ----------------------------<  115[H]  3.9   |  23  |  0.55    Ca    9.7      02 May 2025 02:07  Phos  2.9     05-02  Mg     2.1     05-02    TPro  6.9  /  Alb  3.3  /  TBili  0.7  /  DBili  x   /  AST  12  /  ALT  10  /  AlkPhos  104  05-02  POCT Blood Glucose.: 105 mg/dL (05-02-25 @ 11:10)  A1C with Estimated Average Glucose Result: 6.1 % (02-13-25 @ 07:05)

## 2025-05-02 NOTE — ED ADULT NURSE NOTE - OBJECTIVE STATEMENT
89 year old female BIBEMS for AMS. PMH HTN, HLD, CVA, baseline right sided deficits. As per EMS, patient baseline A&Ox4 but since 1700 patient has roni A&Ox0. As per family son at bedside, patient was baseline alert and then at 1700 patient began confused and mumbling words. As per son, patient has trouble speaking since CVA accident but at this time her speaking is much worse. On assessment, patient is visibly shaking and stating "im falling" while laying in stretcher. Patient breathing spontaneously and unlabored.

## 2025-05-02 NOTE — DIETITIAN INITIAL EVALUATION ADULT - ENERGY INTAKE
Per d/w RN plan to initiate TF today when see earlier this AM. Enteral feed NOT at goal rate Per d/w RN plan to initiate TF today when see earlier this AM. When visiting with daughter at bedside this afternoon, TFs of Glucerna 1.5 infusing at 40cc/hr at time of visit.

## 2025-05-02 NOTE — ED ADULT NURSE NOTE - TEMPLATE
[facility]    Hospitalist Progress Note      Assessment & Plan   Ravi Sandoval is a 75 year old male past medical history of histiocytosis X, pulmonary fibrosis, severe pulmonary hypertension and COPD, who is chronically oxygen dependent on 4 liters who was admitted on 11/23/2018 for treatment of chronic hypoxic respiratory failure.    Summary:    1.  Chronic hypoxic respiratory failure in the setting of pulmonary fibrosis and COPD.  Notes increasing shortness of breath for the past 1 month.  Was treated outpatient with amoxicillin at the end of October for a sinusitis, requiring increasing O2 demands up from his baseline of 4 liters.  Afebrile with no significant leukocytosis.  Lactic acid is elevated at 2.5.  Procalcitonin <0.05.   -Pulmonary Consult   -Continue Levaquin for CAP.  He notes historically Zithromax has not been helpful in the past.     -Continue PTA Prednisone 10 mg daily.  Will defer to pulmonary if IV steroids would be beneficial.   -Scheduled to [facility] as an OP pending prior auth.     -Wean O2 and monitor. Currently still hypoxia on 10 L nasal canula while eating.    2.  Severe pulmonary hypertension. Appears compensated.     -Continue PTA tadalafil, digoxin and every other day Lasix.    3.  Uncontrolled type 2 diabetes.  A1c in October was 10.8.  He is supposed to take glipizide 10 mg daily though notes he has not been taking it as directed.  Does not check his blood sugars.  Oral agents on hold.     -Continue Lantus 5 units at bedtime and sliding scale insulin with meals.  Monitor and adjust as indicated (awaiting pulmonary recs re: steroids).     4.  Hypertension.  Blood pressure elevated in the Emergency Department but now with optimal control.    -Continue PTA Norvasc 2.5 mg daily, lisinopril 20 mg daily and metoprolol 25 mg twice daily.  Will resume and reconcile by pharmacy.  P.r.n. hydralazine will be available as needed.     5.  Hypothyroidism.  Continue prior to  admission Synthroid.     6.  Coronary artery disease with previous bare metal stent to the right coronary artery (RCA) in 2010.  Denies any symptoms of chest pain.  Continue prior to admission aspirin, beta blocker, statin and ACE.      # Pain Assessment:  Current Pain Score 11/24/2018   Patient currently in pain? denies   Pain score (0-10) -   Ravi wells pain level was assessed and he currently denies pain.         DVT Prophylaxis: Lovenox  Code Status: Full Code    Disposition: Expected discharge in 2-3 days once respiratory status improves.    Haily Vyas PA-C    Interval History   Breathing slightly better, but not anywhere near baseline.  On NC at 10 liters sating 89% while drinking tea.  No chest pain or sob.     -Data reviewed today: I reviewed all new labs and imaging results over the last 24 hours. I personally reviewed no images or EKG's today.    Physical Exam   Temp: 97.9  F (36.6  C) Temp src: Oral BP: 139/81 Pulse: 54 Heart Rate: 59 Resp: 20 SpO2: 94 % O2 Device: Oxymask Oxygen Delivery: 6 LPM  Vitals:    11/23/18 1017 11/24/18 0718   Weight: 68 kg (150 lb) 67 kg (147 lb 11.3 oz)     Vital Signs with Ranges  Temp:  [97.5  F (36.4  C)-98  F (36.7  C)] 97.9  F (36.6  C)  Pulse:  [54-60] 54  Heart Rate:  [58-74] 59  Resp:  [14-24] 20  BP: (139-189)/(72-97) 139/81  SpO2:  [91 %-99 %] 94 %  I/O last 3 completed shifts:  In: 2400 [P.O.:300; I.V.:100; IV Piggyback:2000]  Out: 1770 [Urine:1770]    Constitutional: A&O x3, mild to moderate distress, frail appearing  Respiratory: Expiratory wheezing and fibrotic lung sounds at bases with increased respiratory effect, diminished air movement  Cardiovascular: RRR s1 s2 no murmurs, no lower extremity edema  GI: soft, NT, ND, bs present  Skin/Integumen: warm and dry  Other:  Normal affect, VIDES, answer questions appropriately. Shortness of breath noted with minimal movement.    Medications       amLODIPine (NORVASC) tablet 2.5 mg  2.5 mg Oral Daily      aspirin (ASA) chewable tablet 81 mg  81 mg Oral At Bedtime     colchicine (COLCYRS) tablet 0.6 mg  0.6 mg Oral QPM     digoxin  125 mcg Oral Daily     enoxaparin  40 mg Subcutaneous Q24H     FLUoxetine (PROzac) capsule 40 mg  40 mg Oral Daily     furosemide  20 mg Oral Every Other Day     insulin aspart  1-7 Units Subcutaneous TID AC     insulin aspart  1-5 Units Subcutaneous At Bedtime     insulin glargine  5 Units Subcutaneous At Bedtime     levofloxacin  500 mg Intravenous Q24H     levothyroxine  125 mcg Oral Daily     lisinopril (PRINIVIL/ZESTRIL) tablet 20 mg  20 mg Oral Daily     metoprolol succinate  25 mg Oral BID     omeprazole (priLOSEC) CR capsule 40 mg  40 mg Oral Daily     predniSONE (DELTASONE) tablet 10 mg  10 mg Oral Daily     rosuvastatin  10 mg Oral QPM     tadalafil (PAH)  40 mg Oral Daily     tamsulosin  0.4 mg Oral Daily       Data     Recent Labs  Lab 11/23/18  1015   WBC 10.0   HGB 13.6   MCV 86         POTASSIUM 4.0   CHLORIDE 107   CO2 26   BUN 15   CR 0.75   ANIONGAP 8   AV 8.4*   *   ALBUMIN 3.0*   PROTTOTAL 6.6*   BILITOTAL 0.5   ALKPHOS 51   ALT 25   AST 31       Imaging:  Recent Results (from the past 24 hour(s))   XR Chest 2 Views    Narrative    CHEST TWO VIEWS 11/23/2018 11:08 AM     HISTORY: Shortness of breath and possible pneumonia.     COMPARISON: 3/12/2017    FINDINGS: Superimposed on fibrotic-appearing lung abnormalities is  abnormal opacity in the right lower lobe suggestive of developing  pneumonia. Trace right pleural effusion. No pneumothorax. Heart size  normal.       Impression    IMPRESSION: Right lower lobe pneumonia superimposed on  fibrotic-appearing lungs.     YANG DIAZ MD        General

## 2025-05-02 NOTE — DIETITIAN INITIAL EVALUATION ADULT - REASON INDICATOR FOR ASSESSMENT
Consult for "tube feeding, pressure injury stage 2 or >"  Chart reviewed, events noted Consult for "tube feeding, pressure injury stage 2 or >"  Source: chart, RN, patient's son via telephone and later patient's daughter at bedside, NP  Chart reviewed, events noted

## 2025-05-02 NOTE — DIETITIAN INITIAL EVALUATION ADULT - NS FNS REASON FOR WEIGHT CHANG
daughter suspects unintentional weight loss of about 10lbs in recent months between prior outside hospital admissions and rehab stays as of 2025, however ? accuracy compared to HIE growth chart trends

## 2025-05-02 NOTE — ED PROVIDER NOTE - PHYSICAL EXAMINATION
Const: Well-nourished, Well-developed, appearing stated age.  Eyes: no conjunctival injection, and symmetrical lids.  HEENT: Head NCAT, no lesions. Atraumatic external nose and ears.   Neck: Symmetric, trachea midline.   CVS: +S1/S2, Radial and DP pulses 2+ b/l.   RESP: Unlabored respiratory effort. Clear to auscultation bilaterally.  GI: Nontender/Nondistended, No CVA tenderness b/l.   MSK: Normocephalic/Atraumatic,   Skin: Warm, dry and intact.   Neuro: Fluent Speech. Moving all extremities.   Psych: Awake, Alert, & Oriented (AAO) x1 - to self

## 2025-05-02 NOTE — DIETITIAN INITIAL EVALUATION ADULT - OTHER INFO
NKFA per daughter. Patient's daughter states patient has "never weighed more than 150lbs" and clarifies patient would have a steady UBW around 140lbs, reporting patient sustained a degree of unintentional weight loss with prior outside hospital admissions and rehab stays in recent months. Bedscale today reflective of 131lbs. Recent weight history per [facility] growth chart as follows: 128lbs (3/4/25), 128lbs (2/27/25), 119lbs (2/11/25), then from >2 years ago (130lbs 11/27/22, 128lbs 5/13/22).    - HgbA1C 6.1% (2/13); patient w/ h/o DM PTA per chart. Recent BG trend in lower 100s. Ordered for SS admelog.  - Ordered for PO synthroid, MVI.

## 2025-05-03 DIAGNOSIS — R41.82 ALTERED MENTAL STATUS, UNSPECIFIED: ICD-10-CM

## 2025-05-03 DIAGNOSIS — Z93.1 GASTROSTOMY STATUS: ICD-10-CM

## 2025-05-03 DIAGNOSIS — K94.23 GASTROSTOMY MALFUNCTION: ICD-10-CM

## 2025-05-03 LAB
A1C WITH ESTIMATED AVERAGE GLUCOSE RESULT: 5.4 % — SIGNIFICANT CHANGE UP (ref 4–5.6)
ANION GAP SERPL CALC-SCNC: 13 MMOL/L — SIGNIFICANT CHANGE UP (ref 5–17)
BUN SERPL-MCNC: 26 MG/DL — HIGH (ref 7–23)
CALCIUM SERPL-MCNC: 9.9 MG/DL — SIGNIFICANT CHANGE UP (ref 8.4–10.5)
CHLORIDE SERPL-SCNC: 105 MMOL/L — SIGNIFICANT CHANGE UP (ref 96–108)
CO2 SERPL-SCNC: 24 MMOL/L — SIGNIFICANT CHANGE UP (ref 22–31)
CREAT SERPL-MCNC: 0.57 MG/DL — SIGNIFICANT CHANGE UP (ref 0.5–1.3)
EGFR: 86 ML/MIN/1.73M2 — SIGNIFICANT CHANGE UP
EGFR: 86 ML/MIN/1.73M2 — SIGNIFICANT CHANGE UP
ESTIMATED AVERAGE GLUCOSE: 108 MG/DL — SIGNIFICANT CHANGE UP (ref 68–114)
GLUCOSE BLDC GLUCOMTR-MCNC: 146 MG/DL — HIGH (ref 70–99)
GLUCOSE BLDC GLUCOMTR-MCNC: 151 MG/DL — HIGH (ref 70–99)
GLUCOSE SERPL-MCNC: 155 MG/DL — HIGH (ref 70–99)
HCT VFR BLD CALC: 40.3 % — SIGNIFICANT CHANGE UP (ref 34.5–45)
HGB BLD-MCNC: 12.9 G/DL — SIGNIFICANT CHANGE UP (ref 11.5–15.5)
MCHC RBC-ENTMCNC: 28.2 PG — SIGNIFICANT CHANGE UP (ref 27–34)
MCHC RBC-ENTMCNC: 32 G/DL — SIGNIFICANT CHANGE UP (ref 32–36)
MCV RBC AUTO: 88.2 FL — SIGNIFICANT CHANGE UP (ref 80–100)
NRBC BLD AUTO-RTO: 0 /100 WBCS — SIGNIFICANT CHANGE UP (ref 0–0)
PLATELET # BLD AUTO: 251 K/UL — SIGNIFICANT CHANGE UP (ref 150–400)
POTASSIUM SERPL-MCNC: 4.1 MMOL/L — SIGNIFICANT CHANGE UP (ref 3.5–5.3)
POTASSIUM SERPL-SCNC: 4.1 MMOL/L — SIGNIFICANT CHANGE UP (ref 3.5–5.3)
RBC # BLD: 4.57 M/UL — SIGNIFICANT CHANGE UP (ref 3.8–5.2)
RBC # FLD: 17.2 % — HIGH (ref 10.3–14.5)
SODIUM SERPL-SCNC: 142 MMOL/L — SIGNIFICANT CHANGE UP (ref 135–145)
WBC # BLD: 8.67 K/UL — SIGNIFICANT CHANGE UP (ref 3.8–10.5)
WBC # FLD AUTO: 8.67 K/UL — SIGNIFICANT CHANGE UP (ref 3.8–10.5)

## 2025-05-03 PROCEDURE — 99232 SBSQ HOSP IP/OBS MODERATE 35: CPT

## 2025-05-03 PROCEDURE — G0545: CPT

## 2025-05-03 RX ADMIN — Medication 1 TABLET(S): at 12:08

## 2025-05-03 RX ADMIN — HEPARIN SODIUM 5000 UNIT(S): 1000 INJECTION INTRAVENOUS; SUBCUTANEOUS at 05:51

## 2025-05-03 RX ADMIN — Medication 1 DROP(S): at 17:52

## 2025-05-03 RX ADMIN — Medication 20 MILLIGRAM(S): at 12:08

## 2025-05-03 RX ADMIN — Medication 81 MILLIGRAM(S): at 12:08

## 2025-05-03 RX ADMIN — AMLODIPINE BESYLATE 10 MILLIGRAM(S): 10 TABLET ORAL at 05:58

## 2025-05-03 RX ADMIN — ATORVASTATIN CALCIUM 80 MILLIGRAM(S): 80 TABLET, FILM COATED ORAL at 22:46

## 2025-05-03 RX ADMIN — ERTAPENEM SODIUM 100 MILLIGRAM(S): 1 INJECTION, POWDER, LYOPHILIZED, FOR SOLUTION INTRAMUSCULAR; INTRAVENOUS at 12:07

## 2025-05-03 RX ADMIN — Medication 1 DROP(S): at 05:58

## 2025-05-03 RX ADMIN — Medication 3 MILLIGRAM(S): at 22:46

## 2025-05-03 RX ADMIN — LOSARTAN POTASSIUM 50 MILLIGRAM(S): 100 TABLET, FILM COATED ORAL at 05:58

## 2025-05-03 RX ADMIN — HEPARIN SODIUM 5000 UNIT(S): 1000 INJECTION INTRAVENOUS; SUBCUTANEOUS at 17:52

## 2025-05-03 RX ADMIN — Medication 88 MICROGRAM(S): at 05:58

## 2025-05-03 RX ADMIN — METOPROLOL SUCCINATE 25 MILLIGRAM(S): 50 TABLET, EXTENDED RELEASE ORAL at 17:57

## 2025-05-03 RX ADMIN — METOPROLOL SUCCINATE 25 MILLIGRAM(S): 50 TABLET, EXTENDED RELEASE ORAL at 05:52

## 2025-05-04 DIAGNOSIS — R91.8 OTHER NONSPECIFIC ABNORMAL FINDING OF LUNG FIELD: ICD-10-CM

## 2025-05-04 LAB
-  AMPICILLIN/SULBACTAM: SIGNIFICANT CHANGE UP
-  AMPICILLIN: SIGNIFICANT CHANGE UP
-  AZTREONAM: SIGNIFICANT CHANGE UP
-  CEFAZOLIN: SIGNIFICANT CHANGE UP
-  CEFEPIME: SIGNIFICANT CHANGE UP
-  CEFTRIAXONE: SIGNIFICANT CHANGE UP
-  CEFUROXIME: SIGNIFICANT CHANGE UP
-  CIPROFLOXACIN: SIGNIFICANT CHANGE UP
-  ERTAPENEM: SIGNIFICANT CHANGE UP
-  GENTAMICIN: SIGNIFICANT CHANGE UP
-  IMIPENEM: SIGNIFICANT CHANGE UP
-  LEVOFLOXACIN: SIGNIFICANT CHANGE UP
-  MEROPENEM: SIGNIFICANT CHANGE UP
-  NITROFURANTOIN: SIGNIFICANT CHANGE UP
-  PIPERACILLIN/TAZOBACTAM: SIGNIFICANT CHANGE UP
-  TOBRAMYCIN: SIGNIFICANT CHANGE UP
-  TRIMETHOPRIM/SULFAMETHOXAZOLE: SIGNIFICANT CHANGE UP
ANION GAP SERPL CALC-SCNC: 12 MMOL/L — SIGNIFICANT CHANGE UP (ref 5–17)
BUN SERPL-MCNC: 30 MG/DL — HIGH (ref 7–23)
CALCIUM SERPL-MCNC: 9.6 MG/DL — SIGNIFICANT CHANGE UP (ref 8.4–10.5)
CHLORIDE SERPL-SCNC: 104 MMOL/L — SIGNIFICANT CHANGE UP (ref 96–108)
CO2 SERPL-SCNC: 23 MMOL/L — SIGNIFICANT CHANGE UP (ref 22–31)
CREAT SERPL-MCNC: 0.58 MG/DL — SIGNIFICANT CHANGE UP (ref 0.5–1.3)
CULTURE RESULTS: ABNORMAL
EGFR: 86 ML/MIN/1.73M2 — SIGNIFICANT CHANGE UP
EGFR: 86 ML/MIN/1.73M2 — SIGNIFICANT CHANGE UP
GLUCOSE BLDC GLUCOMTR-MCNC: 114 MG/DL — HIGH (ref 70–99)
GLUCOSE BLDC GLUCOMTR-MCNC: 116 MG/DL — HIGH (ref 70–99)
GLUCOSE BLDC GLUCOMTR-MCNC: 141 MG/DL — HIGH (ref 70–99)
GLUCOSE BLDC GLUCOMTR-MCNC: 97 MG/DL — SIGNIFICANT CHANGE UP (ref 70–99)
GLUCOSE SERPL-MCNC: 131 MG/DL — HIGH (ref 70–99)
HCT VFR BLD CALC: 42.4 % — SIGNIFICANT CHANGE UP (ref 34.5–45)
HGB BLD-MCNC: 13.5 G/DL — SIGNIFICANT CHANGE UP (ref 11.5–15.5)
MCHC RBC-ENTMCNC: 28.2 PG — SIGNIFICANT CHANGE UP (ref 27–34)
MCHC RBC-ENTMCNC: 31.8 G/DL — LOW (ref 32–36)
MCV RBC AUTO: 88.7 FL — SIGNIFICANT CHANGE UP (ref 80–100)
METHOD TYPE: SIGNIFICANT CHANGE UP
NRBC BLD AUTO-RTO: 0 /100 WBCS — SIGNIFICANT CHANGE UP (ref 0–0)
ORGANISM # SPEC MICROSCOPIC CNT: ABNORMAL
ORGANISM # SPEC MICROSCOPIC CNT: ABNORMAL
PLATELET # BLD AUTO: 236 K/UL — SIGNIFICANT CHANGE UP (ref 150–400)
POTASSIUM SERPL-MCNC: 3.9 MMOL/L — SIGNIFICANT CHANGE UP (ref 3.5–5.3)
POTASSIUM SERPL-SCNC: 3.9 MMOL/L — SIGNIFICANT CHANGE UP (ref 3.5–5.3)
RBC # BLD: 4.78 M/UL — SIGNIFICANT CHANGE UP (ref 3.8–5.2)
RBC # FLD: 16.8 % — HIGH (ref 10.3–14.5)
SODIUM SERPL-SCNC: 139 MMOL/L — SIGNIFICANT CHANGE UP (ref 135–145)
SPECIMEN SOURCE: SIGNIFICANT CHANGE UP
WBC # BLD: 10.07 K/UL — SIGNIFICANT CHANGE UP (ref 3.8–10.5)
WBC # FLD AUTO: 10.07 K/UL — SIGNIFICANT CHANGE UP (ref 3.8–10.5)

## 2025-05-04 PROCEDURE — 71250 CT THORAX DX C-: CPT | Mod: 26

## 2025-05-04 PROCEDURE — 49465 FLUORO EXAM OF G/COLON TUBE: CPT

## 2025-05-04 RX ORDER — MEROPENEM 1 G/30ML
1000 INJECTION INTRAVENOUS EVERY 8 HOURS
Refills: 0 | Status: DISCONTINUED | OUTPATIENT
Start: 2025-05-04 | End: 2025-05-06

## 2025-05-04 RX ADMIN — METOPROLOL SUCCINATE 25 MILLIGRAM(S): 50 TABLET, EXTENDED RELEASE ORAL at 19:01

## 2025-05-04 RX ADMIN — Medication 81 MILLIGRAM(S): at 19:01

## 2025-05-04 RX ADMIN — Medication 1 TABLET(S): at 19:01

## 2025-05-04 RX ADMIN — HEPARIN SODIUM 5000 UNIT(S): 1000 INJECTION INTRAVENOUS; SUBCUTANEOUS at 05:16

## 2025-05-04 RX ADMIN — Medication 1 DROP(S): at 19:00

## 2025-05-04 RX ADMIN — HEPARIN SODIUM 5000 UNIT(S): 1000 INJECTION INTRAVENOUS; SUBCUTANEOUS at 19:01

## 2025-05-04 RX ADMIN — Medication 88 MICROGRAM(S): at 05:16

## 2025-05-04 RX ADMIN — AMLODIPINE BESYLATE 10 MILLIGRAM(S): 10 TABLET ORAL at 05:16

## 2025-05-04 RX ADMIN — METOPROLOL SUCCINATE 25 MILLIGRAM(S): 50 TABLET, EXTENDED RELEASE ORAL at 05:16

## 2025-05-04 RX ADMIN — Medication 1 DROP(S): at 07:29

## 2025-05-04 RX ADMIN — LOSARTAN POTASSIUM 50 MILLIGRAM(S): 100 TABLET, FILM COATED ORAL at 05:16

## 2025-05-04 RX ADMIN — MEROPENEM 100 MILLIGRAM(S): 1 INJECTION INTRAVENOUS at 22:37

## 2025-05-04 RX ADMIN — ATORVASTATIN CALCIUM 80 MILLIGRAM(S): 80 TABLET, FILM COATED ORAL at 22:37

## 2025-05-04 RX ADMIN — Medication 20 MILLIGRAM(S): at 19:07

## 2025-05-05 LAB
ANION GAP SERPL CALC-SCNC: 12 MMOL/L — SIGNIFICANT CHANGE UP (ref 5–17)
BUN SERPL-MCNC: 29 MG/DL — HIGH (ref 7–23)
CALCIUM SERPL-MCNC: 9.5 MG/DL — SIGNIFICANT CHANGE UP (ref 8.4–10.5)
CHLORIDE SERPL-SCNC: 109 MMOL/L — HIGH (ref 96–108)
CO2 SERPL-SCNC: 22 MMOL/L — SIGNIFICANT CHANGE UP (ref 22–31)
CREAT SERPL-MCNC: 0.54 MG/DL — SIGNIFICANT CHANGE UP (ref 0.5–1.3)
EGFR: 87 ML/MIN/1.73M2 — SIGNIFICANT CHANGE UP
EGFR: 87 ML/MIN/1.73M2 — SIGNIFICANT CHANGE UP
GLUCOSE BLDC GLUCOMTR-MCNC: 144 MG/DL — HIGH (ref 70–99)
GLUCOSE BLDC GLUCOMTR-MCNC: 150 MG/DL — HIGH (ref 70–99)
GLUCOSE BLDC GLUCOMTR-MCNC: 161 MG/DL — HIGH (ref 70–99)
GLUCOSE SERPL-MCNC: 157 MG/DL — HIGH (ref 70–99)
HCT VFR BLD CALC: 41.1 % — SIGNIFICANT CHANGE UP (ref 34.5–45)
HGB BLD-MCNC: 13.4 G/DL — SIGNIFICANT CHANGE UP (ref 11.5–15.5)
MCHC RBC-ENTMCNC: 28.8 PG — SIGNIFICANT CHANGE UP (ref 27–34)
MCHC RBC-ENTMCNC: 32.6 G/DL — SIGNIFICANT CHANGE UP (ref 32–36)
MCV RBC AUTO: 88.2 FL — SIGNIFICANT CHANGE UP (ref 80–100)
NRBC BLD AUTO-RTO: 0 /100 WBCS — SIGNIFICANT CHANGE UP (ref 0–0)
PLATELET # BLD AUTO: 247 K/UL — SIGNIFICANT CHANGE UP (ref 150–400)
POTASSIUM SERPL-MCNC: 4 MMOL/L — SIGNIFICANT CHANGE UP (ref 3.5–5.3)
POTASSIUM SERPL-SCNC: 4 MMOL/L — SIGNIFICANT CHANGE UP (ref 3.5–5.3)
RBC # BLD: 4.66 M/UL — SIGNIFICANT CHANGE UP (ref 3.8–5.2)
RBC # FLD: 16.8 % — HIGH (ref 10.3–14.5)
SODIUM SERPL-SCNC: 143 MMOL/L — SIGNIFICANT CHANGE UP (ref 135–145)
WBC # BLD: 9.75 K/UL — SIGNIFICANT CHANGE UP (ref 3.8–10.5)
WBC # FLD AUTO: 9.75 K/UL — SIGNIFICANT CHANGE UP (ref 3.8–10.5)

## 2025-05-05 PROCEDURE — 99232 SBSQ HOSP IP/OBS MODERATE 35: CPT

## 2025-05-05 PROCEDURE — G0545: CPT

## 2025-05-05 RX ORDER — MELATONIN 5 MG
3 TABLET ORAL ONCE
Refills: 0 | Status: COMPLETED | OUTPATIENT
Start: 2025-05-05 | End: 2025-05-05

## 2025-05-05 RX ADMIN — Medication 1 DROP(S): at 06:09

## 2025-05-05 RX ADMIN — MEROPENEM 100 MILLIGRAM(S): 1 INJECTION INTRAVENOUS at 21:30

## 2025-05-05 RX ADMIN — Medication 81 MILLIGRAM(S): at 12:05

## 2025-05-05 RX ADMIN — HEPARIN SODIUM 5000 UNIT(S): 1000 INJECTION INTRAVENOUS; SUBCUTANEOUS at 17:42

## 2025-05-05 RX ADMIN — Medication 1 TABLET(S): at 12:05

## 2025-05-05 RX ADMIN — Medication 20 MILLIGRAM(S): at 12:06

## 2025-05-05 RX ADMIN — Medication 3 MILLIGRAM(S): at 23:08

## 2025-05-05 RX ADMIN — Medication 88 MICROGRAM(S): at 06:08

## 2025-05-05 RX ADMIN — MEROPENEM 100 MILLIGRAM(S): 1 INJECTION INTRAVENOUS at 13:30

## 2025-05-05 RX ADMIN — METOPROLOL SUCCINATE 25 MILLIGRAM(S): 50 TABLET, EXTENDED RELEASE ORAL at 17:43

## 2025-05-05 RX ADMIN — Medication 1 DROP(S): at 17:42

## 2025-05-05 RX ADMIN — METOPROLOL SUCCINATE 25 MILLIGRAM(S): 50 TABLET, EXTENDED RELEASE ORAL at 06:08

## 2025-05-05 RX ADMIN — ATORVASTATIN CALCIUM 80 MILLIGRAM(S): 80 TABLET, FILM COATED ORAL at 21:31

## 2025-05-05 RX ADMIN — HEPARIN SODIUM 5000 UNIT(S): 1000 INJECTION INTRAVENOUS; SUBCUTANEOUS at 06:08

## 2025-05-05 RX ADMIN — AMLODIPINE BESYLATE 10 MILLIGRAM(S): 10 TABLET ORAL at 06:09

## 2025-05-05 RX ADMIN — LOSARTAN POTASSIUM 50 MILLIGRAM(S): 100 TABLET, FILM COATED ORAL at 06:08

## 2025-05-05 RX ADMIN — MEROPENEM 100 MILLIGRAM(S): 1 INJECTION INTRAVENOUS at 06:09

## 2025-05-05 NOTE — SWALLOW BEDSIDE ASSESSMENT ADULT - PHARYNGEAL PHASE
audible suspect discoordinated gulping swallows/Delayed pharyngeal swallow/Decreased laryngeal elevation

## 2025-05-05 NOTE — SWALLOW BEDSIDE ASSESSMENT ADULT - SWALLOW EVAL: PATIENT/FAMILY GOALS STATEMENT
Pt's daughter reported h/o dysphagia, pt was supposed to receive ST services at home however multiple attempts at receiving ST f/u were unsuccessful. Per daughter, pt was supposed to receive repeat objective testing and daughter requesting completion of MBS while pt remains in house.

## 2025-05-05 NOTE — SWALLOW BEDSIDE ASSESSMENT ADULT - H & P REVIEW
89-year-old female past medical history of hypertension, dementia, prior strokes presenting with chief complaint of episodes of altered mental status and fevers at home.  Patient has a history of dementia, but normally conversive, more lethargic recently.  Episodes of hallucinations.  Fevers particularly at night noted.  Patient usually without any complaints, but has been having a mild cough.  No other issues noted by family at this time. Pt admitted for fever and AMS->r/o urosepsis. 5/2 GI Called->GI consulted for PEG malfunction, plan for PEG exchange on Sunday/yes

## 2025-05-05 NOTE — SWALLOW BEDSIDE ASSESSMENT ADULT - SWALLOW EVAL: RECOMMENDED DIET
Given known h/o dysphagia with PEG in place recommend NPO, with non-oral nutrition/hydration/medications pending MBS

## 2025-05-05 NOTE — SWALLOW BEDSIDE ASSESSMENT ADULT - SWALLOW EVAL: DIAGNOSIS
Pt is 89 y/o F admitted for fever and AMS r/o urosepsis. Now presenting with 1. an oral and suspected pharyngeal dysphagia marked by suspect reduced bolus formation, suspected uncontrolled loss, delayed pharyngeal swallows, audible suspect discoordinated gulping swallows, and delayed cough post PO intake of moderately thick liquids via spoon suggestive of laryngeal penetration/aspiration. 2. Suspect cognitive linguistic impairments and dysarthria.

## 2025-05-05 NOTE — SWALLOW BEDSIDE ASSESSMENT ADULT - SLP GENERAL OBSERVATIONS
Pt awake in bed, on room air. Able to follow some simple directions for exam. Aid at bedside speaks farsi. + Dysarthric speech, limited verbal output. + Contracted UE. PEG feeds running. Pt currently on room air. + Labial asymmetry.

## 2025-05-06 LAB
GLUCOSE BLDC GLUCOMTR-MCNC: 106 MG/DL — HIGH (ref 70–99)
GLUCOSE BLDC GLUCOMTR-MCNC: 135 MG/DL — HIGH (ref 70–99)
GLUCOSE BLDC GLUCOMTR-MCNC: 138 MG/DL — HIGH (ref 70–99)
GLUCOSE BLDC GLUCOMTR-MCNC: 169 MG/DL — HIGH (ref 70–99)

## 2025-05-06 PROCEDURE — 99232 SBSQ HOSP IP/OBS MODERATE 35: CPT

## 2025-05-06 PROCEDURE — 74230 X-RAY XM SWLNG FUNCJ C+: CPT | Mod: 26

## 2025-05-06 PROCEDURE — G0545: CPT

## 2025-05-06 RX ORDER — MELATONIN 5 MG
3 TABLET ORAL ONCE
Refills: 0 | Status: COMPLETED | OUTPATIENT
Start: 2025-05-06 | End: 2025-05-07

## 2025-05-06 RX ADMIN — METOPROLOL SUCCINATE 25 MILLIGRAM(S): 50 TABLET, EXTENDED RELEASE ORAL at 05:39

## 2025-05-06 RX ADMIN — MEROPENEM 100 MILLIGRAM(S): 1 INJECTION INTRAVENOUS at 05:38

## 2025-05-06 RX ADMIN — Medication 88 MICROGRAM(S): at 05:38

## 2025-05-06 RX ADMIN — HEPARIN SODIUM 5000 UNIT(S): 1000 INJECTION INTRAVENOUS; SUBCUTANEOUS at 20:51

## 2025-05-06 RX ADMIN — Medication 1 TABLET(S): at 11:46

## 2025-05-06 RX ADMIN — HEPARIN SODIUM 5000 UNIT(S): 1000 INJECTION INTRAVENOUS; SUBCUTANEOUS at 05:38

## 2025-05-06 RX ADMIN — Medication 1 DROP(S): at 20:50

## 2025-05-06 RX ADMIN — MEROPENEM 100 MILLIGRAM(S): 1 INJECTION INTRAVENOUS at 14:40

## 2025-05-06 RX ADMIN — Medication 1 DROP(S): at 05:38

## 2025-05-06 RX ADMIN — AMLODIPINE BESYLATE 10 MILLIGRAM(S): 10 TABLET ORAL at 05:39

## 2025-05-06 RX ADMIN — ATORVASTATIN CALCIUM 80 MILLIGRAM(S): 80 TABLET, FILM COATED ORAL at 21:24

## 2025-05-06 RX ADMIN — POLYETHYLENE GLYCOL 3350 17 GRAM(S): 17 POWDER, FOR SOLUTION ORAL at 11:46

## 2025-05-06 RX ADMIN — LOSARTAN POTASSIUM 50 MILLIGRAM(S): 100 TABLET, FILM COATED ORAL at 05:38

## 2025-05-06 RX ADMIN — Medication 20 MILLIGRAM(S): at 11:47

## 2025-05-06 RX ADMIN — METOPROLOL SUCCINATE 25 MILLIGRAM(S): 50 TABLET, EXTENDED RELEASE ORAL at 20:51

## 2025-05-06 RX ADMIN — Medication 81 MILLIGRAM(S): at 11:46

## 2025-05-06 NOTE — SWALLOW VFSS/MBS ASSESSMENT ADULT - LARYNGEAL PENETRATION AFTER THE SWALLOW - SILENT
Laryngeal penetration of post-swallow residue over the arytenoids. Material eventually retrieved by spontaneous re-swallows ( 8-10)./Trace

## 2025-05-06 NOTE — SWALLOW VFSS/MBS ASSESSMENT ADULT - ASPIRATION AFTER SWALLOW - SILENT
Silent aspiration of post-swallow residue over the arytenoids descending subglottically. Cued cough unsuccessful in clearing material./Mild Silent aspiration of post-swallow residue over the arytenoids descending subglottically. Pt cued to cough and re-swallow, however, delayed to initiate due to poor command following 2/2 cognitive deficits/[place]./Mild

## 2025-05-06 NOTE — SWALLOW VFSS/MBS ASSESSMENT ADULT - H & P REVIEW
yes 89-year-old female past medical history of hypertension, dementia, prior strokes presenting with chief complaint of episodes of altered mental status and fevers at home.  Patient has a history of dementia, but normally conversive, more lethargic recently.  Episodes of hallucinations.  Fevers particularly at night noted.  Patient usually without any complaints, but has been having a mild cough.  No other issues noted by family at this time. Pt admitted for fever and AMS->r/o urosepsis. 5/2 GI Called->GI consulted for PEG malfunction, plan for PEG exchange on Sunday/yes

## 2025-05-06 NOTE — SWALLOW VFSS/MBS ASSESSMENT ADULT - DIAGNOSTIC IMPRESSIONS
89 y/o F admitted for fever and AMS r/o urosepsis. Instrumental evaluation revealing an leo-pharyngeal dysphagia characterized by reduced oral prep and transport 2/2 discoordinated motor movements, delayed initiation of pharyngeal swallow trigger, reduced hyo-laryngeal excursion and epiglottic inversion resulting in mod-maximum bolus retention in the hypopharynx. Multiple swallow (7-10) per tsp observed across all consistencies administered. Deep laryngeal penetration appreciated on moderately thick  liquids via tsp. Silent aspiration appreciated on pureed textures and mildly thick liquids via TSP. Pt not a candidate for compensatory strategies 2/2 cognitive deficits and hearing impairment. NPO, with non-oral nutrition, hydration, medications is recommended based upon the results of this examination; however, would suggest ongoing GOC discussion with re: to provision of nutrition/hydration should family express interest in pursuing oral diet. 89 y/o F admitted for fever and AMS r/o urosepsis. Instrumental evaluation revealing an leo-pharyngeal dysphagia characterized by reduced oral prep and transport 2/2 discoordinated motor movements, delayed initiation of pharyngeal swallow trigger, reduced hyo-laryngeal excursion and epiglottic inversion resulting in mod-maximum bolus retention in the hypopharynx. Reduced cricopharyngeal opening further contributed to post-swallow residue resulting in patient performing multiple swallows (6-8) per trails to clear residue. Deep laryngeal penetration appreciated on moderately thick liquids via tsp. Silent aspiration appreciated on pureed textures and mildly thick liquids via TSP. Pt not a candidate for compensatory strategies 2/2 cognitive deficits and hearing impairment. NPO, with non-oral nutrition, hydration, medications is recommended based upon the results of this examination; however, would suggest ongoing GOC discussion with re: to provision of nutrition/hydration should family express interest in pursuing oral diet.

## 2025-05-06 NOTE — SWALLOW VFSS/MBS ASSESSMENT ADULT - LARYNGEAL PENETRATION DURING THE SWALLOW - SILENT
Deep laryngeal penetration of material over the arytenoids to the level of the vocal cords. Material incompletely retrieved with trace remaining above the vocal folds../Mild Material penetrated before the swallow retrieved during the swallow. Deep laryngeal penetration over the arytenoids; material not retrieved/Trace

## 2025-05-06 NOTE — CHART NOTE - NSCHARTNOTEFT_GEN_A_CORE
Patient will require Glucerna 1.5 for dx of dysphagia for a lifetime (99months).     Sandra Salas NP  [id number]
pt transferred to DR Rai [facility], will let  let Dr Bess to know
NUTRITION FOLLOW UP NOTE    PATIENT SEEN FOR: verbal consult for bolus feed recommendations     SOURCE: [x] Patient visited  [x] Current Medical Record  [x] RN  [x] Family/support person at bedside (able to communicate in English)  [] Patient unavailable/inappropriate  [x] Other: interdisciplinary team     CHART REVIEWED/EVENTS NOTED.  [] No changes to nutrition care plan to note  [x] Nutrition Status:  - Reason for Admission: uti/ change of mental status per chart  - PEG malfunction s/p PEG exchange per gastroenterology note   - s/p MBS 5/6 recommending "NPO, with non-oral nutrition/hydration/medications."    DIET ORDER:   Diet, NPO with Tube Feed:   Tube Feeding Modality: Gastrostomy  Glucerna 1.5 [place] (GLUCERNA1.5RTH)  Total Volume for 24 Hours (mL): 1200  Continuous  Starting Tube Feed Rate {mL per Hour}: 40  Increase Tube Feed Rate by (mL): 10     Every 6 hours  Until Goal Tube Feed Rate (mL per Hour): 50  Tube Feed Duration (in Hours): 24  Tube Feed Start Time: 10:00  Free Water Flush  Bolus   Total Volume per Flush (mL): 100   Frequency: Every 6 Hours    Start Time: 12:00 (05-04-25)      CURRENT DIET ORDER IS:  [] Appropriate:  [] Inadequate:  [x] Other: see recommendation below     NUTRITION INTAKE/PROVISION:  [] PO:  [x] Enteral Nutrition: tube feeding infusing at current goal of 50 mL/hr at visit, Pt tolerating current tube feed regimen per RN   [] Parenteral Nutrition:    ANTHROPOMETRICS:  Drug Dosing Weight  Height (cm): 157.5 (12 Feb 2025 12:51)  Weight (kg): 54.4 (12 Feb 2025 12:51)  BMI (kg/m2): 21.9 (12 Feb 2025 12:51)  BSA (m2): 1.54 (12 Feb 2025 12:51)  Weights: no new weights to review. RD to continue to monitor weight trends as able/available.     MEDICATIONS:  MEDICATIONS  (STANDING):  acetaminophen   IVPB .. 1000 milliGRAM(s) IV Intermittent once  amLODIPine   Tablet 10 milliGRAM(s) Oral daily  artificial  tears Solution 1 Drop(s) Both EYES two times a day  aspirin  chewable 81 milliGRAM(s) Oral daily  atorvastatin 80 milliGRAM(s) Oral at bedtime  famotidine    Tablet 20 milliGRAM(s) Oral daily  glucagon  Injectable 1 milliGRAM(s) IntraMuscular once  heparin   Injectable 5000 Unit(s) SubCutaneous every 12 hours  levothyroxine 88 MICROGram(s) Oral daily  losartan 50 milliGRAM(s) Oral daily  meropenem  IVPB 1000 milliGRAM(s) IV Intermittent every 8 hours  metoprolol tartrate 25 milliGRAM(s) Enteral Tube two times a day  multivitamin 1 Tablet(s) Oral daily  polyethylene glycol 3350 17 Gram(s) Oral daily    MEDICATIONS  (PRN):  bisacodyl Suppository 10 milliGRAM(s) Rectal daily PRN Constipation  senna 2 Tablet(s) Oral at bedtime PRN Constipation      NUTRITIONALLY PERTINENT LABS:  05-05 Na143 mmol/L Glu 157 mg/dL[H] K+ 4.0 mmol/L Cr  0.54 mg/dL BUN 29 mg/dL[H] 05-02 Phos 2.9 mg/dL 05-02 Alb 3.3 g/dL05-02 ALT 10 U/L AST 12 U/L Alkaline Phosphatase 104 U/L  05-03-25 @ 06:20 a1c 5.4    A1C with Estimated Average Glucose Result: 5.4 % (05-03-25 @ 06:20)  A1C with Estimated Average Glucose Result: 6.1 % (02-13-25 @ 07:05)          Finger Sticks:  POCT Blood Glucose.: 169 mg/dL (05-06 @ 11:26)  POCT Blood Glucose.: 138 mg/dL (05-06 @ 06:31)  POCT Blood Glucose.: 135 mg/dL (05-06 @ 00:42)  POCT Blood Glucose.: 144 mg/dL (05-05 @ 17:08)      NUTRITIONALLY PERTINENT MEDICATIONS/LABS:  [x] Reviewed  [x] Relevant notes on medications/labs:  - oral synthroid noted  - fingersticks reviewed, range:  mg/dL (5/4-5/6)  - MVI ordered     EDEMA:  [x] Reviewed  [x] Relevant notes: 1+ edema left/right feet/ankles per flowsheets     GI/ I&O:  [x] Reviewed  [x] Relevant notes: RN denies GI distress/discomfort. bowel movement recorded 5/5 per flowsheets   [] Other:    SKIN:   [] No pressure injuries documented, per nursing flowsheet  [] Pressure injury previously noted  [] Change in pressure injury documentation:  [x] Other: stage 1 pressure injury to sacrum, suspected deep tissue injury to B/L buttocks per flowsheets   per wound care note 5/2: "Sacrum / Buttocks - Stage I POA  Left Heel - Stage I POA  Right Heel - DTI POA "    ESTIMATED NEEDS:  [x] No change:  [] Updated:  Energy: 2436-4218 kcal/day (27-32 kcal/kg)  Protein: 71-89 g/day (1.2-1.5 g/kg)  Fluid:   ml/day or [x] defer to team  Based on: current weight 59.4 kg per initial assessment     NUTRITION DIAGNOSIS:  [x] Prior Dx: Increased Nutrient Needs.  [] New Dx:    EDUCATION:  [] Yes:  [x] Not appropriate/warranted, family member at bedside without questions for RD at visit when asked. RD remains available     NUTRITION CARE PLAN:  1. Request received for bolus feeding recommendations for discharge. As medically feasible and as tolerated, recommend Glucerna 1.5 at 275 mL Q6H (provides 1100 mL, 1650 kcal, 91 grams protein, 835 mL free water per day; provides 28 kcal/kg, 1.5 grams/kg based on 59.4 kg). Monitor signs and symptoms of aspiration with bolus feeding.   - if bolus initiated, initiate at volume 50 mL /feed and increase as tolerated by 50 mL every feed until goal volume is met and tolerated.   - defer free water flushes to medical team discretion   2. If continuos feeding remains indicated, recommend Glucerna 1.5 at goal 50mL/hr x 22 hours (provides 1100 mL, 1650 kcal, 91 grams protein, 835 mL free water per day; provides 28 kcal/kg, 1.5 grams/kg based on 59.4 kg). 22 hour feeding regimen recommended due to synthroid administration.   3. Multivitamin/mineral supplementation: Continue MVI, add ascorbic acid pending no medical contraindication to promote wound healing  4. Monitor tube feeding tolerance, skin, weight, nutrition related labs, GI function, goals of care     [] Achieved - Continue current nutrition intervention(s)  [] Current medical condition precludes nutrition intervention at this time.    MONITORING AND EVALUATION:   RD remains available upon request and will follow up per protocol.    Kyara Robbins MS, RDN, CDN; available on Teams

## 2025-05-06 NOTE — SWALLOW VFSS/MBS ASSESSMENT ADULT - ORAL PHASE
Delayed A-P transport w/ discoordinated motor movements. Mild oral residue remaining. Reduced A-P transport characterized by discoordinated motor movements. Mild oral residue remaining. Trace spillover the the epiglottis/poster pharyngeal wall. Reduced bolus control with mild spillover to the pyriform sinuses and mild deep laryngeal penetration to the level of the vocal cords. Moderate oral residue remaining which clears with multiple repeat swallows.

## 2025-05-06 NOTE — SWALLOW VFSS/MBS ASSESSMENT ADULT - DELAYED INITIATION OF THE PHARYNGEAL SWALLOW (IN SECONDS)
Pharyngeal phase of swallow triggered at the posterior pharyngeal swallow/epiglottis Pharyngeal phase of swallow triggered at the base of tongue. Pharyngeal phase of swallow triggered at the pyriform sinuses w/ material spilling to the laryngeal vestibule

## 2025-05-06 NOTE — SWALLOW VFSS/MBS ASSESSMENT ADULT - ORAL PREPARATORY PHASE
Prolonged bolus manipulation and reduced formation. Reduced bolus formation. Prolonged bolus manipulation and formation.

## 2025-05-06 NOTE — SWALLOW VFSS/MBS ASSESSMENT ADULT - COMMENTS
5/4: pt status post PEG exchange, XR confirms placement, may initiate tube feeds.    Swallow hx: MBS performed 1/2025 at [facility]. Images noted in pacs however no SLP report found. Pt seen for bedside swallow evaluation on 5/5/25 at which time family endorsing pt w/ intermittent PO intake. Pt w/ evidence of an leo-pharyngeal dysphagia recommended to remain NPO, with non-oral nutrition/hydration/medications and MBS to further assess swallow function.

## 2025-05-06 NOTE — SWALLOW VFSS/MBS ASSESSMENT ADULT - SLP GENERAL OBSERVATIONS
Pt seen in radiology for Modified Barium Swallow Study, seated upright in OMAR chair, on room air, A&Ox1 (self), minimally verbal, poor command following, +IV. Language line solutions utilized as patient Farsi speaking (Amir [id number]).

## 2025-05-07 ENCOUNTER — TRANSCRIPTION ENCOUNTER (OUTPATIENT)
Age: 89
End: 2025-05-07

## 2025-05-07 LAB
CULTURE RESULTS: SIGNIFICANT CHANGE UP
CULTURE RESULTS: SIGNIFICANT CHANGE UP
GLUCOSE BLDC GLUCOMTR-MCNC: 127 MG/DL — HIGH (ref 70–99)
GLUCOSE BLDC GLUCOMTR-MCNC: 158 MG/DL — HIGH (ref 70–99)
SPECIMEN SOURCE: SIGNIFICANT CHANGE UP
SPECIMEN SOURCE: SIGNIFICANT CHANGE UP

## 2025-05-07 RX ORDER — ASPIRIN 325 MG
1 TABLET ORAL
Qty: 30 | Refills: 0
Start: 2025-05-07 | End: 2025-06-05

## 2025-05-07 RX ADMIN — ATORVASTATIN CALCIUM 80 MILLIGRAM(S): 80 TABLET, FILM COATED ORAL at 22:41

## 2025-05-07 RX ADMIN — METOPROLOL SUCCINATE 25 MILLIGRAM(S): 50 TABLET, EXTENDED RELEASE ORAL at 05:17

## 2025-05-07 RX ADMIN — Medication 1 DROP(S): at 05:16

## 2025-05-07 RX ADMIN — LOSARTAN POTASSIUM 50 MILLIGRAM(S): 100 TABLET, FILM COATED ORAL at 05:17

## 2025-05-07 RX ADMIN — HEPARIN SODIUM 5000 UNIT(S): 1000 INJECTION INTRAVENOUS; SUBCUTANEOUS at 05:17

## 2025-05-07 RX ADMIN — Medication 20 MILLIGRAM(S): at 13:48

## 2025-05-07 RX ADMIN — Medication 1 TABLET(S): at 13:48

## 2025-05-07 RX ADMIN — Medication 81 MILLIGRAM(S): at 13:48

## 2025-05-07 RX ADMIN — Medication 1 DROP(S): at 17:42

## 2025-05-07 RX ADMIN — POLYETHYLENE GLYCOL 3350 17 GRAM(S): 17 POWDER, FOR SOLUTION ORAL at 13:48

## 2025-05-07 RX ADMIN — Medication 88 MICROGRAM(S): at 05:17

## 2025-05-07 RX ADMIN — Medication 3 MILLIGRAM(S): at 00:06

## 2025-05-07 RX ADMIN — HEPARIN SODIUM 5000 UNIT(S): 1000 INJECTION INTRAVENOUS; SUBCUTANEOUS at 17:41

## 2025-05-07 RX ADMIN — AMLODIPINE BESYLATE 10 MILLIGRAM(S): 10 TABLET ORAL at 05:17

## 2025-05-07 RX ADMIN — METOPROLOL SUCCINATE 25 MILLIGRAM(S): 50 TABLET, EXTENDED RELEASE ORAL at 17:42

## 2025-05-07 NOTE — OCCUPATIONAL THERAPY INITIAL EVALUATION ADULT - DIAGNOSIS, OT EVAL
Pt with decreased ADL status and impairments with functional mobility due to decreased strength, ROM, balance, cognition, safety.

## 2025-05-07 NOTE — PROGRESS NOTE ADULT - PROBLEM/PLAN-7
DISPLAY PLAN FREE TEXT
Pawel Goodman(Attending)

## 2025-05-07 NOTE — OCCUPATIONAL THERAPY INITIAL EVALUATION ADULT - PERTINENT HX OF CURRENT PROBLEM, REHAB EVAL
89-year-old female past medical history of hypertension, dementia, prior strokes presenting with chief complaint of episodes of altered mental status and fevers at home.  Patient has a history of dementia, but normally conversive, more lethargic recently.  Episodes of hallucinations.  Fevers particularly at night noted.  Patient usually without any complaints, but has been having a mild cough.  No other issues noted by family at this time. CXR: No evidence of pneumonia. CT HEAD: No acute intracranial hemorrhage, mass effect, or midline shift.

## 2025-05-07 NOTE — DISCHARGE NOTE PROVIDER - NSDCFUSCHEDAPPT_GEN_ALL_CORE_FT
Ismael Brown  [facility]  ORTHOSURG [address] [facility]  Scheduled Appointment: 05/15/2025

## 2025-05-07 NOTE — PROGRESS NOTE ADULT - PROBLEM SELECTOR PROBLEM 8
Lung field abnormal finding on examination
Lung field abnormal finding on examination
PEG tube malfunction
Lung field abnormal finding on examination
Lung field abnormal finding on examination

## 2025-05-07 NOTE — PHYSICAL THERAPY INITIAL EVALUATION ADULT - NSPTDISCHREC_GEN_A_CORE
FAMILY HISTORY:  No pertinent family history in first degree relatives     resumption of prior level of assistance/Home PT

## 2025-05-07 NOTE — PHYSICAL THERAPY INITIAL EVALUATION ADULT - STRENGTHENING, PT EVAL
3. GOAL: In 2 weeks, pt will increase overall strength by 1/2 MMT grade to improve functional mobility.

## 2025-05-07 NOTE — PROGRESS NOTE ADULT - PROBLEM SELECTOR PLAN 8
CXR no acute changes  Chest CT  for further evaluation.
Chest CT  , no pneumonia  F/U prn
changing tube pending
no evidence of  pneumonia  F/U prn
stable at present time  aspiration pneumonia precaution

## 2025-05-07 NOTE — DISCHARGE NOTE PROVIDER - NSDCFUADDINST_GEN_ALL_CORE_FT
Wound Care Instructions:    Impression    Urinary incontinent  Sacrum / Buttocks - Stage I POA  Left Heel - Stage I POA  Right Heel - DTI POA     1. Sacrum / Buttocks - Stage I POA  Topical therapy: Cleanse with incontinence cleanser, pat dry, and apply Cavilon, cover with Allevyn foam border dressing for padded protection. Frequency: Daily & PRN Soiling.    2. Right & Left Heel   Topical therapy: apply Cavilon, cover with Allevyn foam border dressing for padded protection. Frequency: Daily & PRN Soiling.  Elevate heels; apply Complete Cair air fluidized boots; ensure that the soles of the feet are not resting on the foot board of the bed.      3. Incontinent management - incontinent cleanser, pads, lucien care BID            4. Maintain on an alternating air with low air loss surface             5. Turn & reposition every 2 hr; Use positioning pillow to turn and reposition, soft pillow between bony prominences; continue measures to decrease friction/shear/pressure.    
Yes

## 2025-05-07 NOTE — PHYSICAL THERAPY INITIAL EVALUATION ADULT - PERTINENT HX OF CURRENT PROBLEM, REHAB EVAL
89 y.o. F PMH hypertension, dementia, prior strokes presenting with chief complaint of episodes of altered mental status and fevers at home.  Patient has a history of dementia, but normally conversive, more lethargic recently.  Episodes of hallucinations.  Fevers particularly at night noted.  Patient usually without any complaints, but has been having a mild cough.  Found to have UTI.

## 2025-05-07 NOTE — DISCHARGE NOTE PROVIDER - NSDCFUADDAPPT_GEN_ALL_CORE_FT
APPTS ARE READY TO BE MADE: [ X] YES    Best Family or Patient Contact (if needed):    Additional Information about above appointments (if needed):    1: Dr. Bess   2:   3:     Other comments or requests:

## 2025-05-07 NOTE — PHYSICAL THERAPY INITIAL EVALUATION ADULT - RANGE OF MOTION EXAMINATION, REHAB EVAL
R side passive ROM WFL/Left UE ROM was WFL (within functional limits)/Left LE ROM was WFL (within functional limits)

## 2025-05-07 NOTE — PHYSICAL THERAPY INITIAL EVALUATION ADULT - ADDITIONAL COMMENTS
Pt resides in an apartment in a pvt home w/ daughter, no steps to negotiate. PTA pt did not ambulate, mostly bedbound. Pt was dependent in ADL's & mobility. Owns hospital bed, shower chair, & wheelchair.

## 2025-05-07 NOTE — DISCHARGE NOTE PROVIDER - INSTRUCTIONS
Glucerna 1.5 Kendell (GLUCERNA1.5RTH)  Total Volume for 24 Hours (mL): 1100  Bolus Glucerna 1.5 Kendell (GLUCERNA1.5RTH)  Total Volume for 24 Hours (mL): 1100  Total volume of Bolus: 275 mL  Every 6 hours  Free Water Flush: 100 mL every 6 hours

## 2025-05-07 NOTE — DISCHARGE NOTE PROVIDER - NSDCMRMEDTOKEN_GEN_ALL_CORE_FT
acetaminophen 325 mg oral tablet: 2 tab(s) by gastrostomy tube every 6 hours as needed for  mild/moderate pain  albuterol 2.5 mg/3 mL (0.083%) inhalation solution: 3 milliliter(s) by nebulizer 4 times a day as needed for  shortness of breath and/or wheezing  amantadine 50 mg/5 mL oral liquid: 10 milliliter(s) by gastrostomy tube once a day  amLODIPine 10 mg oral tablet: 1 tab(s) by gastrostomy tube once a day Hold for SBP less than 110, and notify MD  Artificial Tears ophthalmic solution: 1 drop(s) in each eye 2 times a day  atorvastatin 80 mg oral tablet: 1 tab(s) by gastrostomy tube once a day  docusate sodium 50 mg/15 mL oral syrup: 15 milliliter(s) orally 2 times a day  Dulcolax Laxative 10 mg rectal suppository: 1 suppository(ies) rectally once a day as needed for  constipation  famotidine 20 mg oral tablet: 1 tab(s) by gastrostomy tube once a day  heparin 5000 units/mL injectable solution: 5,000 unit(s) subcutaneously once a day x 30 days and then re-evaluate need  HumaLOG KwikPen 200 units/mL (Concentrated) subcutaneous solution: subcutaneous 4 times a day Sliding scale, 12am, 6am, 12pm, 6pm  Lantus Solostar Pen 100 units/mL subcutaneous solution: 16 unit(s) subcutaneous once a day (at bedtime)  levothyroxine 88 mcg (0.088 mg) oral tablet: 1 tab(s) by gastrostomy tube once a day  losartan 50 mg oral tablet: 1 tab(s) by gastrostomy tube once a day Hold for SBP less than 110 and notify MD  magnesium citrate: 1 by gastrostomy tube once as needed for  constipation  magnesium hydroxide: 400 milligram(s) by gastrostomy tube every 48 hours as needed for  constipation  Metoprolol Tartrate 25 mg oral tablet: 1 tab(s) by gastrostomy tube 2 times a day Hold for SBP less than 110 or HR less than 60, and notify MD  MiraLax oral powder for reconstitution: 17 gram(s) orally once a day  Multiple Vitamins oral tablet: 1 tab(s) by gastrostomy tube once a day  PEG feeds: Glucerna 1.5  : 275 mL every 6 hours  senna (sennosides) 8.6 mg oral tablet: 2 tab(s) by gastrostomy tube once a day (at bedtime)  sodium chloride nasal: 2 spray(s) nasal 2 times a day  Vitamin C 500 mg oral capsule: 1 cap(s) by gastrostomy tube 2 times a day   acetaminophen 325 mg oral tablet: 2 tab(s) by gastrostomy tube every 6 hours as needed for  mild/moderate pain  albuterol 2.5 mg/3 mL (0.083%) inhalation solution: 3 milliliter(s) by nebulizer 4 times a day as needed for  shortness of breath and/or wheezing  amantadine 50 mg/5 mL oral liquid: 10 milliliter(s) by gastrostomy tube once a day  amLODIPine 10 mg oral tablet: 1 tab(s) by gastrostomy tube once a day Hold for SBP less than 110, and notify MD  Artificial Tears ophthalmic solution: 1 drop(s) in each eye 2 times a day  aspirin 81 mg oral tablet, chewable: 1 tab(s) orally once a day  atorvastatin 80 mg oral tablet: 1 tab(s) by gastrostomy tube once a day  docusate sodium 50 mg/15 mL oral syrup: 15 milliliter(s) orally 2 times a day  Dulcolax Laxative 10 mg rectal suppository: 1 suppository(ies) rectally once a day as needed for  constipation  famotidine 20 mg oral tablet: 1 tab(s) by gastrostomy tube once a day  heparin 5000 units/mL injectable solution: 5,000 unit(s) subcutaneously once a day x 30 days and then re-evaluate need  HumaLOG KwikPen 200 units/mL (Concentrated) subcutaneous solution: subcutaneous 4 times a day Sliding scale, 12am, 6am, 12pm, 6pm  Lantus Solostar Pen 100 units/mL subcutaneous solution: 16 unit(s) subcutaneous once a day (at bedtime)  levothyroxine 88 mcg (0.088 mg) oral tablet: 1 tab(s) by gastrostomy tube once a day  losartan 50 mg oral tablet: 1 tab(s) by gastrostomy tube once a day Hold for SBP less than 110 and notify MD  magnesium citrate: 1 by gastrostomy tube once as needed for  constipation  magnesium hydroxide: 400 milligram(s) by gastrostomy tube every 48 hours as needed for  constipation  Metoprolol Tartrate 25 mg oral tablet: 1 tab(s) by gastrostomy tube 2 times a day Hold for SBP less than 110 or HR less than 60, and notify MD  MiraLax oral powder for reconstitution: 17 gram(s) orally once a day  Multiple Vitamins oral tablet: 1 tab(s) by gastrostomy tube once a day  PEG feeds: Glucerna 1.5  : 275 mL every 6 hours  senna (sennosides) 8.6 mg oral tablet: 2 tab(s) by gastrostomy tube once a day (at bedtime)  sodium chloride nasal: 2 spray(s) nasal 2 times a day  Vitamin C 500 mg oral capsule: 1 cap(s) by gastrostomy tube 2 times a day   acetaminophen 325 mg oral tablet: 2 tab(s) by gastrostomy tube every 6 hours as needed for  mild/moderate pain  albuterol 2.5 mg/3 mL (0.083%) inhalation solution: 3 milliliter(s) by nebulizer 4 times a day as needed for  shortness of breath and/or wheezing  amantadine 50 mg/5 mL oral liquid: 10 milliliter(s) by gastrostomy tube once a day  amLODIPine 10 mg oral tablet: 1 tab(s) by gastrostomy tube once a day Hold for SBP less than 110, and notify MD  Artificial Tears ophthalmic solution: 1 drop(s) in each eye 2 times a day  aspirin 81 mg oral tablet, chewable: 1 tab(s) orally once a day  atorvastatin 80 mg oral tablet: 1 tab(s) by gastrostomy tube once a day  docusate sodium 50 mg/15 mL oral syrup: 15 milliliter(s) orally 2 times a day  Dulcolax Laxative 10 mg rectal suppository: 1 suppository(ies) rectally once a day as needed for  constipation  famotidine 20 mg oral tablet: 1 tab(s) by gastrostomy tube once a day  HumaLOG KwikPen 200 units/mL (Concentrated) subcutaneous solution: subcutaneous 4 times a day Sliding scale, 12am, 6am, 12pm, 6pm  Lantus Solostar Pen 100 units/mL subcutaneous solution: 16 unit(s) subcutaneous once a day (at bedtime)  levothyroxine 88 mcg (0.088 mg) oral tablet: 1 tab(s) by gastrostomy tube once a day  losartan 50 mg oral tablet: 1 tab(s) by gastrostomy tube once a day Hold for SBP less than 110 and notify MD  magnesium citrate: 1 by gastrostomy tube once as needed for  constipation  magnesium hydroxide: 400 milligram(s) by gastrostomy tube every 48 hours as needed for  constipation  Metoprolol Tartrate 25 mg oral tablet: 1 tab(s) by gastrostomy tube 2 times a day Hold for SBP less than 110 or HR less than 60, and notify MD  MiraLax oral powder for reconstitution: 17 gram(s) orally once a day  Multiple Vitamins oral tablet: 1 tab(s) by gastrostomy tube once a day  PEG feeds: Glucerna 1.5  : 275 mL every 6 hours  senna (sennosides) 8.6 mg oral tablet: 2 tab(s) by gastrostomy tube once a day (at bedtime)  sodium chloride nasal: 2 spray(s) nasal 2 times a day  Vitamin C 500 mg oral capsule: 1 cap(s) by gastrostomy tube 2 times a day

## 2025-05-07 NOTE — PROGRESS NOTE ADULT - PROBLEM SELECTOR PROBLEM 6
PEG (percutaneous endoscopic gastrostomy) status
Diabetes
PEG (percutaneous endoscopic gastrostomy) status

## 2025-05-07 NOTE — DISCHARGE NOTE PROVIDER - NSDCCPCAREPLAN_GEN_ALL_CORE_FT
PRINCIPAL DISCHARGE DIAGNOSIS  Diagnosis: Acute UTI  Assessment and Plan of Treatment: You were admitted to the hospital for altered mental status (AMS). Your urinalysis showed signs of a possible urinary tract infection (pyuria), and your urine culture grew ESBL E. coli, a type of bacteria resistant to some antibiotics. While it wasn’t clear if you had typical UTI symptoms, you received antibiotics to treat the infection. You initially received ceftriaxone but developed a rash, possibly indicating an allergic reaction. The rash improved after switching to ertapenem and then meropenem, you have now completed a full course of 5 days of antibiotics.   Contact your doctor immediately if you experience any of the following:  Fever or chills  Back pain or flank pain (pain in your side below your ribs and above your hip)  Nausea or vomiting  New or worsening confusion  Burning sensation during urination  Cloudy or bloody urine  Strong or foul-smelling urine  Increased frequency or urgency of urination        SECONDARY DISCHARGE DIAGNOSES  Diagnosis: PEG (percutaneous endoscopic gastrostomy) status  Assessment and Plan of Treatment: Your PEG tube was exchanged, and its placement has been confirmed by X-ray. You are now tolerating feeds through the tube.    Diagnosis: CVA (cerebrovascular accident)  Assessment and Plan of Treatment: You had a cerebrovascular accident and you have residual right sided hemiplegia please continue Aspirin.

## 2025-05-07 NOTE — PROGRESS NOTE ADULT - PROBLEM SELECTOR PLAN 7
stable  observation  neurology referral prn
cont current meds
FS monitoring  insulin prn
FS F/U
on insulin  F/U prn
FS monitoring

## 2025-05-07 NOTE — OCCUPATIONAL THERAPY INITIAL EVALUATION ADULT - ADDITIONAL COMMENTS
Pt lives in a private home (separate apartment) with daughter, no steps to negotiate. Pt is bedbound, dependent with all ADLs and functional mobility. Pt owns hospital bed, shower chair, wheelchair. Pt has home health aide throughout the day.

## 2025-05-07 NOTE — DISCHARGE NOTE PROVIDER - CARE PROVIDER_API CALL
Maria Alejandra [facility]  Internal Medicine  [address], [address]  [place] [address]  Phone: [phone]  Fax: [phone]  Follow Up Time:

## 2025-05-07 NOTE — OCCUPATIONAL THERAPY INITIAL EVALUATION ADULT - LEVEL OF INDEPENDENCE: SIT/STAND, REHAB EVAL
due to safety, decreased command follow, decreased sitting balance/unable to perform maximum assist (25% patients effort)

## 2025-05-08 ENCOUNTER — TRANSCRIPTION ENCOUNTER (OUTPATIENT)
Age: 89
End: 2025-05-08

## 2025-05-08 VITALS
OXYGEN SATURATION: 97 % | TEMPERATURE: 98 F | HEART RATE: 82 BPM | DIASTOLIC BLOOD PRESSURE: 74 MMHG | SYSTOLIC BLOOD PRESSURE: 121 MMHG | RESPIRATION RATE: 18 BRPM

## 2025-05-08 PROCEDURE — 84443 ASSAY THYROID STIM HORMONE: CPT

## 2025-05-08 PROCEDURE — 84145 PROCALCITONIN (PCT): CPT

## 2025-05-08 PROCEDURE — 80053 COMPREHEN METABOLIC PANEL: CPT

## 2025-05-08 PROCEDURE — 82947 ASSAY GLUCOSE BLOOD QUANT: CPT

## 2025-05-08 PROCEDURE — 87186 SC STD MICRODIL/AGAR DIL: CPT

## 2025-05-08 PROCEDURE — 74230 X-RAY XM SWLNG FUNCJ C+: CPT

## 2025-05-08 PROCEDURE — 85610 PROTHROMBIN TIME: CPT

## 2025-05-08 PROCEDURE — 84295 ASSAY OF SERUM SODIUM: CPT

## 2025-05-08 PROCEDURE — 82803 BLOOD GASES ANY COMBINATION: CPT

## 2025-05-08 PROCEDURE — 71250 CT THORAX DX C-: CPT | Mod: MC

## 2025-05-08 PROCEDURE — 84132 ASSAY OF SERUM POTASSIUM: CPT

## 2025-05-08 PROCEDURE — 83036 HEMOGLOBIN GLYCOSYLATED A1C: CPT

## 2025-05-08 PROCEDURE — 84100 ASSAY OF PHOSPHORUS: CPT

## 2025-05-08 PROCEDURE — 85018 HEMOGLOBIN: CPT

## 2025-05-08 PROCEDURE — 93005 ELECTROCARDIOGRAM TRACING: CPT

## 2025-05-08 PROCEDURE — 82330 ASSAY OF CALCIUM: CPT

## 2025-05-08 PROCEDURE — 71045 X-RAY EXAM CHEST 1 VIEW: CPT

## 2025-05-08 PROCEDURE — 36415 COLL VENOUS BLD VENIPUNCTURE: CPT

## 2025-05-08 PROCEDURE — 92610 EVALUATE SWALLOWING FUNCTION: CPT

## 2025-05-08 PROCEDURE — 87086 URINE CULTURE/COLONY COUNT: CPT

## 2025-05-08 PROCEDURE — 80048 BASIC METABOLIC PNL TOTAL CA: CPT

## 2025-05-08 PROCEDURE — 49465 FLUORO EXAM OF G/COLON TUBE: CPT

## 2025-05-08 PROCEDURE — 87040 BLOOD CULTURE FOR BACTERIA: CPT

## 2025-05-08 PROCEDURE — 85027 COMPLETE CBC AUTOMATED: CPT

## 2025-05-08 PROCEDURE — 70450 CT HEAD/BRAIN W/O DYE: CPT | Mod: MC

## 2025-05-08 PROCEDURE — 83735 ASSAY OF MAGNESIUM: CPT

## 2025-05-08 PROCEDURE — 92611 MOTION FLUOROSCOPY/SWALLOW: CPT

## 2025-05-08 PROCEDURE — 82962 GLUCOSE BLOOD TEST: CPT

## 2025-05-08 PROCEDURE — 87637 SARSCOV2&INF A&B&RSV AMP PRB: CPT

## 2025-05-08 PROCEDURE — 85014 HEMATOCRIT: CPT

## 2025-05-08 PROCEDURE — 97161 PT EVAL LOW COMPLEX 20 MIN: CPT

## 2025-05-08 PROCEDURE — 99285 EMERGENCY DEPT VISIT HI MDM: CPT

## 2025-05-08 PROCEDURE — 81001 URINALYSIS AUTO W/SCOPE: CPT

## 2025-05-08 PROCEDURE — 85025 COMPLETE CBC W/AUTO DIFF WBC: CPT

## 2025-05-08 PROCEDURE — 94640 AIRWAY INHALATION TREATMENT: CPT

## 2025-05-08 PROCEDURE — 82435 ASSAY OF BLOOD CHLORIDE: CPT

## 2025-05-08 PROCEDURE — 83605 ASSAY OF LACTIC ACID: CPT

## 2025-05-08 PROCEDURE — 97166 OT EVAL MOD COMPLEX 45 MIN: CPT

## 2025-05-08 PROCEDURE — 85730 THROMBOPLASTIN TIME PARTIAL: CPT

## 2025-05-08 RX ADMIN — Medication 1 DROP(S): at 05:32

## 2025-05-08 RX ADMIN — Medication 1 TABLET(S): at 11:16

## 2025-05-08 RX ADMIN — METOPROLOL SUCCINATE 25 MILLIGRAM(S): 50 TABLET, EXTENDED RELEASE ORAL at 05:32

## 2025-05-08 RX ADMIN — LOSARTAN POTASSIUM 50 MILLIGRAM(S): 100 TABLET, FILM COATED ORAL at 05:23

## 2025-05-08 RX ADMIN — Medication 88 MICROGRAM(S): at 05:32

## 2025-05-08 RX ADMIN — Medication 81 MILLIGRAM(S): at 11:16

## 2025-05-08 RX ADMIN — AMLODIPINE BESYLATE 10 MILLIGRAM(S): 10 TABLET ORAL at 05:32

## 2025-05-08 RX ADMIN — Medication 20 MILLIGRAM(S): at 11:17

## 2025-05-08 RX ADMIN — HEPARIN SODIUM 5000 UNIT(S): 1000 INJECTION INTRAVENOUS; SUBCUTANEOUS at 05:32

## 2025-05-08 NOTE — PROGRESS NOTE ADULT - PROBLEM SELECTOR PROBLEM 4
Hypertension
Hypothyroidism

## 2025-05-08 NOTE — PROGRESS NOTE ADULT - PROBLEM/PLAN-3
Patient is requesting refill for irbesartan 75 mg  Last refilled 1/23/19  Last OV 1/23/19          
DISPLAY PLAN FREE TEXT

## 2025-05-08 NOTE — PROGRESS NOTE ADULT - ASSESSMENT
89  F w urosepsis, GI consulted for PEG malfunction, plan for PEG exchange on Sunday  
	  89  F w urosepsis, GI consulted for PEG malfunction, status post PEG exchange, XR confirms placement, may initiate tube feeds   
89  F w urosepsis, GI consulted for PEG malfunction    1. PEG Malfunction   -s/p PEG exchange, XR confirms placement  -aspiration precautions w/feeds    2. Urosepsis  
89  F w urosepsis, GI consulted for PEG malfunction    1. PEG Malfunction   -s/p PEG exchange, XR confirms placement  -tolerating feeds    2. Urosepsis  
89 f with HTN, dementia, prolong hospitalization 10/2024-12/2024 for CVA with R sided weakness and dysphagia s/p PEG (but at home family feeds her as well), fall and ankle fx 2/2025 s/p Percutaneous repair of right ankle joint 2/12, now brought in by family for AMS, hallucination and her temp was 99 but no change in resp status, no diarrhea  afebrile, HR in 80-90s, no hypoxia  CXR clear  head CT no acute infarct or hemorrhage  u/a with pyuria   pt was started on ceftriaxone, now appears erythematous, I asked the son if this is her normal color he said yes, then said may be she is more red now    AMS, urine cx with ESBL E-coli I to erta, S to tabatha, ?UTI, unclear if pt symptomatic but no significant cough, vomiting, diarrhea and no fever or WBC, chest CT no pneuonia  erythema, likely due to ceftriaxone as pt has ?allergy to cefazolin  * pt remains afebrile in the hospital, erythema improved after switching to erta, blood cx negative   * s/p erta 5/2-5/4 and switched to tabatha 5/4, will do 3 days of tabatha which will be total of 5 days    The above assessment and plan was discussed with the primary team    Shy Alvares MD  contact on teams  After 5pm and on weekends call [phone]    
89 f with HTN, dementia, prolong hospitalization 10/2024-12/2024 for CVA with R sided weakness and dysphagia s/p PEG (but at home family feeds her as well), fall and ankle fx 2/2025 s/p Percutaneous repair of right ankle joint 2/12, now brought in by family for AMS, hallucination and her temp was 99 but no change in resp status, no diarrhea  afebrile, HR in 80-90s, no hypoxia  CXR clear  head CT no acute infarct or hemorrhage  u/a with pyuria   pt was started on ceftriaxone, now appears erythematous, I asked the son if this is her normal color he said yes, then said may be she is more red now    AMS, positive u/a, ?UTI, unclear if pt symptomatic but no significant cough, vomiting, diarrhea and no fever or WBC  erythema, likely due to ceftriaxone as pt has ?allergy to cefazolin  * pt remains afebrile in the hospital, erythema improved after switching to erta, blood cx negative todate  * follow the blood and urine cx  * ceftriaxone switched to ertapenem 1 qd as previous urine cx showed ESBL E-coli and pt had erythema, ?reaction  * if worsening status will need abd/pelvis CT with contrast  * monitor CBC/diff and CMP    The above assessment and plan was discussed with the primary team    Shy Alvares MD  contact on teams  After 5pm and on weekends call [phone]  
	  89  F w urosepsis, GI consulted for PEG malfunction, status post PEG exchange, XR confirms placement, may initiate tube feeds  
89  F w urosepsis, GI consulted for PEG malfunction    1. PEG Malfunction   -s/p PEG exchange  -tolerating feeds    2. Urosepsis  
89 f with HTN, dementia, prolong hospitalization 10/2024-12/2024 for CVA with R sided weakness and dysphagia s/p PEG (but at home family feeds her as well), fall and ankle fx 2/2025 s/p Percutaneous repair of right ankle joint 2/12, now brought in by family for AMS, hallucination and her temp was 99 but no change in resp status, no diarrhea  afebrile, HR in 80-90s, no hypoxia  CXR clear  head CT no acute infarct or hemorrhage  u/a with pyuria   pt was started on ceftriaxone, now appears erythematous, I asked the son if this is her normal color he said yes, then said may be she is more red now    AMS, urine cx with ESBL E-coli I to erta, S to tabatha, ?UTI, unclear if pt symptomatic but no significant cough, vomiting, diarrhea and no fever or WBC, chest CT no pneuonia  erythema, likely due to ceftriaxone as pt has ?allergy to cefazolin  * pt remains afebrile in the hospital, erythema improved after switching to erta, blood cx negative   * s/p erta 5/2-5/4 and switched to tabatha 5/4, will complete a  3 days of tabatha today which will be total of 5 days    The above assessment and plan was discussed with the primary team    Shy Alvares MD  contact on teams  After 5pm and on weekends call [phone]      
89-year-old female past medical history of hypertension, dementia, prior strokes with residual right sided hemiplegia, dysphagia s/p PEG,  presented w/ altered mental status and fevers at home x 3 days.   +UTI

## 2025-05-08 NOTE — PROGRESS NOTE ADULT - PROBLEM SELECTOR PLAN 2
stable  PT F/U
stable  neurology F/U prn
stable at present time  neurology F/U prn
with residual right sided hemiplegia   ASA.
Stable  PT prn
observation
stable   observation  neurology F/U prn

## 2025-05-08 NOTE — PROGRESS NOTE ADULT - PROBLEM SELECTOR PROBLEM 2
CVA (cerebrovascular accident)

## 2025-05-08 NOTE — PROGRESS NOTE ADULT - PROBLEM SELECTOR PLAN 5
On synthroid
on synthroid
On synthroid.
on synthroid  PCP F/U
On synthroid.
On synthroid.
on cynthroid
on synthroid
GI F/U

## 2025-05-08 NOTE — PROGRESS NOTE ADULT - PROBLEM SELECTOR PLAN 1
cont ertapenem 1 qd IV  follow up cultures  if worsening status will need abd/pelvis CT with contrast  ID consulted  DVT ppx.
discontinue ceftriaxone and start ertapenem 1 qd as previous urine cx showed ESBL E-coli  follow up cultures  if worsening status will need abd/pelvis CT with contrast  ID consulted  DVT ppx.
on IV Abx
on IV Abx  ID F/U appreciated
IV abx  follow up cultures  if worsening status will need abd/pelvis CT with contrast  ID consulted  DVT ppx.
improved  F/U prn
on IV Abx  C/S pending
IV abx  ID consulted  DVT ppx.  Patient cleared for dc later today once meds are done
s/p IV abx  ID consulted  DVT ppx.  Patient cleared for dc home today
s/p IV abx  ID consulted  DVT ppx.  Patient cleared for dc home today
on Abx  C/S F/U  ID consult appreciated
improved

## 2025-05-08 NOTE — PROGRESS NOTE ADULT - PROBLEM SELECTOR PROBLEM 5
Hypothyroidism
Hypothyroidism
PEG (percutaneous endoscopic gastrostomy) status
Hypothyroidism

## 2025-05-08 NOTE — PROGRESS NOTE ADULT - PROBLEM SELECTOR PROBLEM 3
Dysphagia
Hypertension
Dysphagia

## 2025-05-08 NOTE — PROGRESS NOTE ADULT - PROBLEM SELECTOR PLAN 3
feeding through PEG
s/p PEG   recent issues with PEG as per family at bedside   GI consulted   Nutrition consult regarding PEG feeds.
s/p PEG   recent issues with PEG as per family at bedside   GI consulted   s/p PEG exchange
BP monitoring  cont current Rx
on PEG
PEG feeding
PEG feeding  aspiration precaution
Speech pathologist F/U
s/p PEG   recent issues with PEG as per family at bedside   GI consulted   s/p PEG exchange
s/p PEG   recent issues with PEG as per family at bedside   GI consulted   Nutrition consult regarding PEG feeds.

## 2025-05-08 NOTE — DISCHARGE NOTE NURSING/CASE MANAGEMENT/SOCIAL WORK - PATIENT PORTAL LINK FT
You can access the FollowMyHealth Patient Portal offered by [facility] by registering at the following website: [email or web address]. By joining [employer]’s FollowMyHealth portal, you will also be able to view your health information using other applications (apps) compatible with our system.

## 2025-05-08 NOTE — DISCHARGE NOTE NURSING/CASE MANAGEMENT/SOCIAL WORK - NSDCVIVACCINE_GEN_ALL_CORE_FT
influenza, high-dose, quadrivalent; 29-Nov-2022 14:27; Bob Gramajo (INGRIS); [employer]; [id number] (Exp. Date: 30-Jun-2023); IntraMuscular; Deltoid Left.; 0.7 milliLiter(s); VIS (VIS Published: 06-Aug-2021, VIS Presented: 29-Nov-2022);

## 2025-05-08 NOTE — DISCHARGE NOTE NURSING/CASE MANAGEMENT/SOCIAL WORK - FINANCIAL ASSISTANCE
[facility] provides services at a reduced cost to those who are determined to be eligible through [facility]’s financial assistance program. Information regarding [facility]’s financial assistance program can be found by going to [email or web address] or by calling [phone].

## 2025-05-08 NOTE — PROGRESS NOTE ADULT - PROBLEM SELECTOR PLAN 4
Stable   cont with current meds.
BP monitoring
Stable   cont with current meds.
BP monitoring  cont current meds
Stable   cont with current meds.
Stable   cont with current meds.
BP monitoring
cont current meds
continue current meds  PCP F/U
On synthroid

## 2025-05-08 NOTE — PROGRESS NOTE ADULT - SUBJECTIVE AND OBJECTIVE BOX
CARLOS DEVRIES  89y  Female      Patient is a 89y old  Female who presents with a chief complaint of uti/ change of mental status (07 May 2025 12:27)      INTERVAL HPI/OVERNIGHT EVENTS:        REVIEW OF SYSTEMS:  CONSTITUTIONAL: No fever, weight loss, or fatigue  EYES: No eye pain, visual disturbances, or discharge  ENMT:   No sinus or throat pain  NECK: No pain or stiffness  BREASTS: No pain, masses, or nipple discharge  RESPIRATORY: No cough, wheezing, chills or hemoptysis; No shortness of breath  CARDIOVASCULAR: No chest pain, palpitations, dizziness, or leg swelling  GASTROINTESTINAL: No abdominal or epigastric pain. No nausea, vomiting, or hematemesis; No diarrhea or constipation. No melena or hematochezia.  GENITOURINARY: No hematuria  NEUROLOGICAL: demented    SKIN: No itching, burning, rashes, or lesions   LYMPH NODES: No enlarged glands  ENDOCRINE: No heat or cold intolerance; No hair loss  MUSCULOSKELETAL: No joint pain    HEME/LYMPH: No easy bruising, or bleeding gums  ALLERY AND IMMUNOLOGIC: No hives or eczema    T(C): 36.5 (05-07-25 @ 16:53), Max: 36.7 (05-07-25 @ 10:48)  HR: 80 (05-07-25 @ 16:53) (73 - 93)  BP: 121/76 (05-07-25 @ 16:53) (121/76 - 133/72)  RR: 18 (05-07-25 @ 16:53) (18 - 18)  SpO2: 97% (05-07-25 @ 16:53) (94% - 97%)  Wt(kg): --Vital Signs Last 24 Hrs  T(C): 36.5 (07 May 2025 16:53), Max: 36.7 (07 May 2025 10:48)  T(F): 97.7 (07 May 2025 16:53), Max: 98.1 (07 May 2025 10:48)  HR: 80 (07 May 2025 16:53) (73 - 93)  BP: 121/76 (07 May 2025 16:53) (121/76 - 133/72)  BP(mean): --  RR: 18 (07 May 2025 16:53) (18 - 18)  SpO2: 97% (07 May 2025 16:53) (94% - 97%)    Parameters below as of 07 May 2025 16:53  Patient On (Oxygen Delivery Method): room air        PHYSICAL EXAM:  GENERAL: NAD, well-groomed, well-developed  HEAD:  Atraumatic, Normocephalic  EYES: EOMI, PERRLA, conjunctiva and sclera clear  ENMT: No tonsillar erythema, exudates, or enlargement; Moist mucous membranes, Good dentition, No lesions  NECK: Supple, No JVD, Normal thyroid  NERVOUS SYSTEM:  demented.no focal neurodeficit.  CHEST/LUNG: Clear to percussion bilaterally; No rales, rhonchi, wheezing, or rubs  HEART: Regular rate and rhythm; No murmurs, rubs, or gallops  ABDOMEN: Soft, Nontender, Nondistended; Bowel sounds present  EXTREMITIES:  2+ Peripheral Pulses, No clubbing, cyanosis.  LYMPH: No lymphadenopathy noted  SKIN: No rashes or lesions    Consultant(s) Notes Reviewed:  [x ] YES  [ ] NO  Care Discussed with Consultants/Other Providers [ x] YES  [ ] NO    LABS:              CAPILLARY BLOOD GLUCOSE      POCT Blood Glucose.: 127 mg/dL (07 May 2025 11:22)  POCT Blood Glucose.: 158 mg/dL (07 May 2025 07:55)            RADIOLOGY & ADDITIONAL TESTS:    Imaging Personally Reviewed:  [ ] YES  [ ] NO
     acetaminophen   IVPB .. 1000 milliGRAM(s) IV Intermittent once  amLODIPine   Tablet 10 milliGRAM(s) Oral daily  artificial  tears Solution 1 Drop(s) Both EYES two times a day  aspirin  chewable 81 milliGRAM(s) Oral daily  atorvastatin 80 milliGRAM(s) Oral at bedtime  bisacodyl Suppository 10 milliGRAM(s) Rectal daily PRN  dextrose 5%. 1000 milliLiter(s) IV Continuous <Continuous>  dextrose 5%. 1000 milliLiter(s) IV Continuous <Continuous>  dextrose 50% Injectable 25 Gram(s) IV Push once  dextrose 50% Injectable 12.5 Gram(s) IV Push once  dextrose 50% Injectable 25 Gram(s) IV Push once  dextrose Oral Gel 15 Gram(s) Oral once PRN  ertapenem  IVPB 1000 milliGRAM(s) IV Intermittent every 24 hours  famotidine    Tablet 20 milliGRAM(s) Oral daily  glucagon  Injectable 1 milliGRAM(s) IntraMuscular once  heparin   Injectable 5000 Unit(s) SubCutaneous every 12 hours  insulin lispro (ADMELOG) corrective regimen sliding scale   SubCutaneous every 6 hours  levothyroxine 88 MICROGram(s) Oral daily  losartan 50 milliGRAM(s) Oral daily  melatonin 3 milliGRAM(s) Oral once  metoprolol tartrate 25 milliGRAM(s) Enteral Tube two times a day  multivitamin 1 Tablet(s) Oral daily  polyethylene glycol 3350 17 Gram(s) Oral daily  senna 2 Tablet(s) Oral at bedtime PRN                            12.9   8.67  )-----------( 251      ( 03 May 2025 06:20 )             40.3       Hemoglobin: 12.9 g/dL (05-03 @ 06:20)  Hemoglobin: 13.1 g/dL (05-02 @ 02:07)      05-03    142  |  105  |  26[H]  ----------------------------<  155[H]  4.1   |  24  |  0.57    Ca    9.9      03 May 2025 06:21  Phos  2.9     05-02  Mg     2.1     05-02    TPro  6.9  /  Alb  3.3  /  TBili  0.7  /  DBili  x   /  AST  12  /  ALT  10  /  AlkPhos  104  05-02    Creatinine Trend: 0.57<--, 0.55<--    COAGS:           T(C): 36.7 (05-03-25 @ 11:09), Max: 37.6 (05-03-25 @ 04:02)  HR: 84 (05-03-25 @ 11:09) (72 - 103)  BP: 116/71 (05-03-25 @ 11:09) (108/64 - 138/80)  RR: 18 (05-03-25 @ 11:09) (17 - 18)  SpO2: 95% (05-03-25 @ 11:09) (92% - 96%)  Wt(kg): --    I&O's Summary    02 May 2025 07:01  -  03 May 2025 07:00  --------------------------------------------------------  IN: 540 mL / OUT: 1450 mL / NET: -910 mL    03 May 2025 07:01  -  03 May 2025 11:46  --------------------------------------------------------  IN: 0 mL / OUT: 0 mL / NET: 0 mL      ROS:     General:  No wt loss, fevers, chills, night sweats, fatigue,   Eyes:  Good vision, no reported pain  ENT:  No sore throat, pain, runny nose, dysphagia  CV:  No pain, palpitations, hypo/hypertension  Resp:  No dyspnea, cough, tachypnea, wheezing  GI:  See HPI  :  No pain, bleeding, incontinence, nocturia  Muscle:  No pain, weakness  Neuro:  No weakness, tingling, memory problems  Psych:  No fatigue, insomnia, mood problems, depression  Endocrine:  No polyuria, polydipsia, cold/heat intolerance  Heme:  No petechiae, ecchymosis, easy bruisability  Integumentary:  No rash, edema      PHYSICAL EXAM:     GENERAL:  Appears stated age, well-groomed, well-nourished, no distress  HEENT:  NC/AT,  conjunctivae anicteric, clear and pink,   NECK: supple, trachea midline  CHEST:  Full & symmetric excursion, no increased effort, breath sounds clear  HEART:  Regular rhythm, no JVD  ABDOMEN:  Soft, non-tender, non-distended, normoactive bowel sounds,  no masses , no hepatosplenomegaly  EXTREMITIES:  no cyanosis,clubbing or edema  SKIN:  No rash, erythema, or, ecchymoses, no jaundice  NEURO:  Alert, non-focal, no asterixis  PSYCH: Appropriate affect, oriented to place and time  RECTAL: Deferred      
INTERVAL HPI/OVERNIGHT EVENTS:    doing well   tolerating feeds    MEDICATIONS  (STANDING):  acetaminophen   IVPB .. 1000 milliGRAM(s) IV Intermittent once  amLODIPine   Tablet 10 milliGRAM(s) Oral daily  artificial  tears Solution 1 Drop(s) Both EYES two times a day  aspirin  chewable 81 milliGRAM(s) Oral daily  atorvastatin 80 milliGRAM(s) Oral at bedtime  famotidine    Tablet 20 milliGRAM(s) Oral daily  glucagon  Injectable 1 milliGRAM(s) IntraMuscular once  heparin   Injectable 5000 Unit(s) SubCutaneous every 12 hours  levothyroxine 88 MICROGram(s) Oral daily  losartan 50 milliGRAM(s) Oral daily  metoprolol tartrate 25 milliGRAM(s) Enteral Tube two times a day  multivitamin 1 Tablet(s) Oral daily  polyethylene glycol 3350 17 Gram(s) Oral daily    MEDICATIONS  (PRN):  bisacodyl Suppository 10 milliGRAM(s) Rectal daily PRN Constipation  senna 2 Tablet(s) Oral at bedtime PRN Constipation      Allergies    cefazolin (Flushing)    Intolerances        Review of Systems:    General:  No wt loss, fevers, chills, night sweats, fatigue   Eyes:  Good vision, no reported pain  ENT:  No sore throat, pain, runny nose, dysphagia  CV:  No pain, palpitations, hypo/hypertension  Resp:  No dyspnea, cough, tachypnea, wheezing  GI:  No pain, No nausea, No vomiting, No diarrhea, No constipation, No weight loss, No fever, No pruritis, No rectal bleeding, No melena, No dysphagia  :  No pain, bleeding, incontinence, nocturia  Muscle:  No pain, weakness  Neuro:  No weakness, tingling, memory problems  Psych:  No fatigue, insomnia, mood problems, depression  Endocrine:  No polyuria, polydypsia, cold/heat intolerance  Heme:  No petechiae, ecchymosis, easy bruisability  Skin:  No rash, tattoos, scars, edema      Vital Signs Last 24 Hrs  T(C): 36.7 (07 May 2025 10:48), Max: 36.7 (07 May 2025 10:48)  T(F): 98.1 (07 May 2025 10:48), Max: 98.1 (07 May 2025 10:48)  HR: 73 (07 May 2025 10:48) (73 - 93)  BP: 126/72 (07 May 2025 10:48) (122/76 - 133/72)  BP(mean): --  RR: 18 (07 May 2025 10:48) (18 - 18)  SpO2: 95% (07 May 2025 10:48) (94% - 95%)    Parameters below as of 07 May 2025 10:48  Patient On (Oxygen Delivery Method): room air        PHYSICAL EXAM:    Constitutional: NAD  HEENT: EOMI, throat clear  Neck: No LAD, supple  Respiratory: CTA and P  Cardiovascular: S1 and S2, RRR, no M  Gastrointestinal: BS+, soft, NT/ND, neg HSM,  Extremities: No peripheral edema, neg clubbing, cyanosis  Vascular: 2+ peripheral pulses  Neurological: A/O   Psychiatric: Normal mood, normal affect  Skin: No rashes      LABS:                RADIOLOGY & ADDITIONAL TESTS:  ·	
INTERVAL HPI/OVERNIGHT EVENTS:    failed mbs  peg c/d/i    MEDICATIONS  (STANDING):  acetaminophen   IVPB .. 1000 milliGRAM(s) IV Intermittent once  amLODIPine   Tablet 10 milliGRAM(s) Oral daily  artificial  tears Solution 1 Drop(s) Both EYES two times a day  aspirin  chewable 81 milliGRAM(s) Oral daily  atorvastatin 80 milliGRAM(s) Oral at bedtime  famotidine    Tablet 20 milliGRAM(s) Oral daily  glucagon  Injectable 1 milliGRAM(s) IntraMuscular once  heparin   Injectable 5000 Unit(s) SubCutaneous every 12 hours  levothyroxine 88 MICROGram(s) Oral daily  losartan 50 milliGRAM(s) Oral daily  meropenem  IVPB 1000 milliGRAM(s) IV Intermittent every 8 hours  metoprolol tartrate 25 milliGRAM(s) Enteral Tube two times a day  multivitamin 1 Tablet(s) Oral daily  polyethylene glycol 3350 17 Gram(s) Oral daily    MEDICATIONS  (PRN):  bisacodyl Suppository 10 milliGRAM(s) Rectal daily PRN Constipation  senna 2 Tablet(s) Oral at bedtime PRN Constipation      Allergies    cefazolin (Flushing)    Intolerances        Review of Systems:    General:  No wt loss, fevers, chills, night sweats, fatigue   Eyes:  Good vision, no reported pain  ENT:  No sore throat, pain, runny nose, dysphagia  CV:  No pain, palpitations, hypo/hypertension  Resp:  No dyspnea, cough, tachypnea, wheezing  GI:  No pain, No nausea, No vomiting, No diarrhea, No constipation, No weight loss, No fever, No pruritis, No rectal bleeding, No melena, No dysphagia  :  No pain, bleeding, incontinence, nocturia  Muscle:  No pain, weakness  Neuro:  No weakness, tingling, memory problems  Psych:  No fatigue, insomnia, mood problems, depression  Endocrine:  No polyuria, polydypsia, cold/heat intolerance  Heme:  No petechiae, ecchymosis, easy bruisability  Skin:  No rash, tattoos, scars, edema      Vital Signs Last 24 Hrs  T(C): 36.6 (06 May 2025 11:03), Max: 36.8 (05 May 2025 16:38)  T(F): 97.8 (06 May 2025 11:03), Max: 98.2 (05 May 2025 16:38)  HR: 88 (06 May 2025 11:03) (77 - 88)  BP: 120/78 (06 May 2025 11:03) (119/75 - 147/69)  BP(mean): --  RR: 18 (06 May 2025 11:03) (18 - 18)  SpO2: 96% (06 May 2025 11:03) (95% - 97%)    Parameters below as of 06 May 2025 11:03  Patient On (Oxygen Delivery Method): room air        PHYSICAL EXAM:    Constitutional: NAD  HEENT: EOMI, throat clear  Neck: No LAD, supple  Respiratory: CTA and P  Cardiovascular: S1 and S2, RRR, no M  Gastrointestinal: BS+, soft, NT/ND, neg HSM,  Extremities: No peripheral edema, neg clubbing, cyanosis  Vascular: 2+ peripheral pulses  Neurological: A/O   Psychiatric: Normal mood, normal affect  Skin: No rashes      LABS:                        13.4   9.75  )-----------( 247      ( 05 May 2025 06:35 )             41.1     05-05    143  |  109[H]  |  29[H]  ----------------------------<  157[H]  4.0   |  22  |  0.54    Ca    9.5      05 May 2025 06:35        Urinalysis Basic - ( 05 May 2025 06:35 )    Color: x / Appearance: x / SG: x / pH: x  Gluc: 157 mg/dL / Ketone: x  / Bili: x / Urobili: x   Blood: x / Protein: x / Nitrite: x   Leuk Esterase: x / RBC: x / WBC x   Sq Epi: x / Non Sq Epi: x / Bacteria: x        RADIOLOGY & ADDITIONAL TESTS:  
INTERVAL HPI/OVERNIGHT EVENTS:    seen earlier without acute gi events   d/c planning     MEDICATIONS  (STANDING):  acetaminophen   IVPB .. 1000 milliGRAM(s) IV Intermittent once  amLODIPine   Tablet 10 milliGRAM(s) Oral daily  artificial  tears Solution 1 Drop(s) Both EYES two times a day  aspirin  chewable 81 milliGRAM(s) Oral daily  atorvastatin 80 milliGRAM(s) Oral at bedtime  famotidine    Tablet 20 milliGRAM(s) Oral daily  glucagon  Injectable 1 milliGRAM(s) IntraMuscular once  heparin   Injectable 5000 Unit(s) SubCutaneous every 12 hours  levothyroxine 88 MICROGram(s) Oral daily  losartan 50 milliGRAM(s) Oral daily  metoprolol tartrate 25 milliGRAM(s) Enteral Tube two times a day  multivitamin 1 Tablet(s) Oral daily  polyethylene glycol 3350 17 Gram(s) Oral daily    MEDICATIONS  (PRN):  bisacodyl Suppository 10 milliGRAM(s) Rectal daily PRN Constipation  senna 2 Tablet(s) Oral at bedtime PRN Constipation      Allergies    cefazolin (Flushing)    Intolerances        Review of Systems:    General:  No wt loss, fevers, chills, night sweats, fatigue   Eyes:  Good vision, no reported pain  ENT:  No sore throat, pain, runny nose, dysphagia  CV:  No pain, palpitations, hypo/hypertension  Resp:  No dyspnea, cough, tachypnea, wheezing  GI:  No pain, No nausea, No vomiting, No diarrhea, No constipation, No weight loss, No fever, No pruritis, No rectal bleeding, No melena, No dysphagia  :  No pain, bleeding, incontinence, nocturia  Muscle:  No pain, weakness  Neuro:  No weakness, tingling, memory problems  Psych:  No fatigue, insomnia, mood problems, depression  Endocrine:  No polyuria, polydypsia, cold/heat intolerance  Heme:  No petechiae, ecchymosis, easy bruisability  Skin:  No rash, tattoos, scars, edema      Vital Signs Last 24 Hrs  T(C): 37.1 (08 May 2025 11:07), Max: 37.1 (08 May 2025 11:07)  T(F): 98.7 (08 May 2025 11:07), Max: 98.7 (08 May 2025 11:07)  HR: 72 (08 May 2025 11:07) (72 - 82)  BP: 104/60 (08 May 2025 11:07) (104/60 - 138/81)  BP(mean): --  RR: 18 (08 May 2025 11:07) (18 - 18)  SpO2: 94% (08 May 2025 11:07) (94% - 97%)    Parameters below as of 08 May 2025 11:07  Patient On (Oxygen Delivery Method): room air        PHYSICAL EXAM:    Constitutional: NAD  HEENT: EOMI, throat clear  Neck: No LAD, supple  Respiratory: CTA and P  Cardiovascular: S1 and S2, RRR, no M  Gastrointestinal: BS+, soft, NT/ND, neg HSM,  Extremities: No peripheral edema, neg clubbing, cyanosis  Vascular: 2+ peripheral pulses  Neurological: A/O   Psychiatric: Normal mood, normal affect  Skin: No rashes      LABS:                RADIOLOGY & ADDITIONAL TESTS:  
   no GI events    acetaminophen   IVPB .. 1000 milliGRAM(s) IV Intermittent once  amLODIPine   Tablet 10 milliGRAM(s) Oral daily  artificial  tears Solution 1 Drop(s) Both EYES two times a day  aspirin  chewable 81 milliGRAM(s) Oral daily  atorvastatin 80 milliGRAM(s) Oral at bedtime  bisacodyl Suppository 10 milliGRAM(s) Rectal daily PRN  dextrose 5%. 1000 milliLiter(s) IV Continuous <Continuous>  dextrose 5%. 1000 milliLiter(s) IV Continuous <Continuous>  dextrose 50% Injectable 25 Gram(s) IV Push once  dextrose 50% Injectable 12.5 Gram(s) IV Push once  dextrose 50% Injectable 25 Gram(s) IV Push once  dextrose Oral Gel 15 Gram(s) Oral once PRN  ertapenem  IVPB 1000 milliGRAM(s) IV Intermittent every 24 hours  famotidine    Tablet 20 milliGRAM(s) Oral daily  glucagon  Injectable 1 milliGRAM(s) IntraMuscular once  heparin   Injectable 5000 Unit(s) SubCutaneous every 12 hours  insulin lispro (ADMELOG) corrective regimen sliding scale   SubCutaneous every 6 hours  levothyroxine 88 MICROGram(s) Oral daily  losartan 50 milliGRAM(s) Oral daily  melatonin 3 milliGRAM(s) Oral once  metoprolol tartrate 25 milliGRAM(s) Enteral Tube two times a day  multivitamin 1 Tablet(s) Oral daily  polyethylene glycol 3350 17 Gram(s) Oral daily  senna 2 Tablet(s) Oral at bedtime PRN                            12.9   8.67  )-----------( 251      ( 03 May 2025 06:20 )             40.3       Hemoglobin: 12.9 g/dL (05-03 @ 06:20)  Hemoglobin: 13.1 g/dL (05-02 @ 02:07)      05-03    142  |  105  |  26[H]  ----------------------------<  155[H]  4.1   |  24  |  0.57    Ca    9.9      03 May 2025 06:21  Phos  2.9     05-02  Mg     2.1     05-02    TPro  6.9  /  Alb  3.3  /  TBili  0.7  /  DBili  x   /  AST  12  /  ALT  10  /  AlkPhos  104  05-02    Creatinine Trend: 0.57<--, 0.55<--    COAGS:           T(C): 36.7 (05-03-25 @ 11:09), Max: 37.6 (05-03-25 @ 04:02)  HR: 84 (05-03-25 @ 11:09) (72 - 103)  BP: 116/71 (05-03-25 @ 11:09) (108/64 - 138/80)  RR: 18 (05-03-25 @ 11:09) (17 - 18)  SpO2: 95% (05-03-25 @ 11:09) (92% - 96%)  Wt(kg): --    I&O's Summary    02 May 2025 07:01  -  03 May 2025 07:00  --------------------------------------------------------  IN: 540 mL / OUT: 1450 mL / NET: -910 mL    03 May 2025 07:01  -  03 May 2025 11:46  --------------------------------------------------------  IN: 0 mL / OUT: 0 mL / NET: 0 mL      ROS:     General:  No wt loss, fevers, chills, night sweats, fatigue,   Eyes:  Good vision, no reported pain  ENT:  No sore throat, pain, runny nose, dysphagia  CV:  No pain, palpitations, hypo/hypertension  Resp:  No dyspnea, cough, tachypnea, wheezing  GI:  See HPI  :  No pain, bleeding, incontinence, nocturia  Muscle:  No pain, weakness  Neuro:  No weakness, tingling, memory problems  Psych:  No fatigue, insomnia, mood problems, depression  Endocrine:  No polyuria, polydipsia, cold/heat intolerance  Heme:  No petechiae, ecchymosis, easy bruisability  Integumentary:  No rash, edema      PHYSICAL EXAM:     GENERAL:  Appears stated age, well-groomed, well-nourished, no distress  HEENT:  NC/AT,  conjunctivae anicteric, clear and pink,   NECK: supple, trachea midline  CHEST:  Full & symmetric excursion, no increased effort, breath sounds clear  HEART:  Regular rhythm, no JVD  ABDOMEN:  Soft, non-tender, non-distended, normoactive bowel sounds,  no masses , no hepatosplenomegaly  EXTREMITIES:  no cyanosis,clubbing or edema  SKIN:  No rash, erythema, or, ecchymoses, no jaundice  NEURO:  Alert, non-focal, no asterixis  PSYCH: Appropriate affect, oriented to place and time  RECTAL: Deferred      
   no GI events    acetaminophen   IVPB .. 1000 milliGRAM(s) IV Intermittent once  amLODIPine   Tablet 10 milliGRAM(s) Oral daily  artificial  tears Solution 1 Drop(s) Both EYES two times a day  aspirin  chewable 81 milliGRAM(s) Oral daily  atorvastatin 80 milliGRAM(s) Oral at bedtime  bisacodyl Suppository 10 milliGRAM(s) Rectal daily PRN  dextrose 5%. 1000 milliLiter(s) IV Continuous <Continuous>  dextrose 5%. 1000 milliLiter(s) IV Continuous <Continuous>  dextrose 50% Injectable 25 Gram(s) IV Push once  dextrose 50% Injectable 12.5 Gram(s) IV Push once  dextrose 50% Injectable 25 Gram(s) IV Push once  dextrose Oral Gel 15 Gram(s) Oral once PRN  ertapenem  IVPB 1000 milliGRAM(s) IV Intermittent every 24 hours  famotidine    Tablet 20 milliGRAM(s) Oral daily  glucagon  Injectable 1 milliGRAM(s) IntraMuscular once  heparin   Injectable 5000 Unit(s) SubCutaneous every 12 hours  insulin lispro (ADMELOG) corrective regimen sliding scale   SubCutaneous every 6 hours  levothyroxine 88 MICROGram(s) Oral daily  losartan 50 milliGRAM(s) Oral daily  melatonin 3 milliGRAM(s) Oral once  metoprolol tartrate 25 milliGRAM(s) Enteral Tube two times a day  multivitamin 1 Tablet(s) Oral daily  polyethylene glycol 3350 17 Gram(s) Oral daily  senna 2 Tablet(s) Oral at bedtime PRN                            12.9   8.67  )-----------( 251      ( 03 May 2025 06:20 )             40.3       Hemoglobin: 12.9 g/dL (05-03 @ 06:20)  Hemoglobin: 13.1 g/dL (05-02 @ 02:07)      05-03    142  |  105  |  26[H]  ----------------------------<  155[H]  4.1   |  24  |  0.57    Ca    9.9      03 May 2025 06:21  Phos  2.9     05-02  Mg     2.1     05-02    TPro  6.9  /  Alb  3.3  /  TBili  0.7  /  DBili  x   /  AST  12  /  ALT  10  /  AlkPhos  104  05-02    Creatinine Trend: 0.57<--, 0.55<--    COAGS:           T(C): 36.7 (05-03-25 @ 11:09), Max: 37.6 (05-03-25 @ 04:02)  HR: 84 (05-03-25 @ 11:09) (72 - 103)  BP: 116/71 (05-03-25 @ 11:09) (108/64 - 138/80)  RR: 18 (05-03-25 @ 11:09) (17 - 18)  SpO2: 95% (05-03-25 @ 11:09) (92% - 96%)  Wt(kg): --    I&O's Summary    02 May 2025 07:01  -  03 May 2025 07:00  --------------------------------------------------------  IN: 540 mL / OUT: 1450 mL / NET: -910 mL    03 May 2025 07:01  -  03 May 2025 11:46  --------------------------------------------------------  IN: 0 mL / OUT: 0 mL / NET: 0 mL      ROS:     General:  No wt loss, fevers, chills, night sweats, fatigue,   Eyes:  Good vision, no reported pain  ENT:  No sore throat, pain, runny nose, dysphagia  CV:  No pain, palpitations, hypo/hypertension  Resp:  No dyspnea, cough, tachypnea, wheezing  GI:  See HPI  :  No pain, bleeding, incontinence, nocturia  Muscle:  No pain, weakness  Neuro:  No weakness, tingling, memory problems  Psych:  No fatigue, insomnia, mood problems, depression  Endocrine:  No polyuria, polydipsia, cold/heat intolerance  Heme:  No petechiae, ecchymosis, easy bruisability  Integumentary:  No rash, edema      PHYSICAL EXAM:     GENERAL:  Appears stated age, well-groomed, well-nourished, no distress  HEENT:  NC/AT,  conjunctivae anicteric, clear and pink,   NECK: supple, trachea midline  CHEST:  Full & symmetric excursion, no increased effort, breath sounds clear  HEART:  Regular rhythm, no JVD  ABDOMEN:  Soft, non-tender, non-distended, normoactive bowel sounds,  no masses , no hepatosplenomegaly  EXTREMITIES:  no cyanosis,clubbing or edema  SKIN:  No rash, erythema, or, ecchymoses, no jaundice  NEURO:  Alert, non-focal, no asterixis  PSYCH: Appropriate affect, oriented to place and time  RECTAL: Deferred      
  Follow Up:  AMS, ?UTI    Interval History/ROS: pt afebrile, s/p PEG exchange        Allergies  cefazolin (Flushing)        ANTIMICROBIALS:  meropenem  IVPB 1000 every 8 hours      OTHER MEDS:  acetaminophen   IVPB .. 1000 milliGRAM(s) IV Intermittent once  amLODIPine   Tablet 10 milliGRAM(s) Oral daily  artificial  tears Solution 1 Drop(s) Both EYES two times a day  aspirin  chewable 81 milliGRAM(s) Oral daily  atorvastatin 80 milliGRAM(s) Oral at bedtime  bisacodyl Suppository 10 milliGRAM(s) Rectal daily PRN  famotidine    Tablet 20 milliGRAM(s) Oral daily  glucagon  Injectable 1 milliGRAM(s) IntraMuscular once  heparin   Injectable 5000 Unit(s) SubCutaneous every 12 hours  levothyroxine 88 MICROGram(s) Oral daily  losartan 50 milliGRAM(s) Oral daily  metoprolol tartrate 25 milliGRAM(s) Enteral Tube two times a day  multivitamin 1 Tablet(s) Oral daily  polyethylene glycol 3350 17 Gram(s) Oral daily  senna 2 Tablet(s) Oral at bedtime PRN      Vital Signs Last 24 Hrs  T(C): 36.6 (06 May 2025 11:03), Max: 36.8 (05 May 2025 16:38)  T(F): 97.8 (06 May 2025 11:03), Max: 98.2 (05 May 2025 16:38)  HR: 88 (06 May 2025 11:03) (77 - 88)  BP: 120/78 (06 May 2025 11:03) (119/75 - 147/69)  BP(mean): --  RR: 18 (06 May 2025 11:03) (18 - 18)  SpO2: 96% (06 May 2025 11:03) (95% - 97%)    Parameters below as of 06 May 2025 11:03  Patient On (Oxygen Delivery Method): room air        Physical Exam:  General:    NAD, non toxic  Respiratory:   comfortable on RA  abd:   soft, PEG, not tender  :   no flores  Musculoskeletal : no joint swelling  Skin: resolved erythema of face, neck, torso and UEs  vascular: no phlebitis                            13.4   9.75  )-----------( 247      ( 05 May 2025 06:35 )             41.1       05-05    143  |  109[H]  |  29[H]  ----------------------------<  157[H]  4.0   |  22  |  0.54    Ca    9.5      05 May 2025 06:35        Urinalysis Basic - ( 05 May 2025 06:35 )    Color: x / Appearance: x / SG: x / pH: x  Gluc: 157 mg/dL / Ketone: x  / Bili: x / Urobili: x   Blood: x / Protein: x / Nitrite: x   Leuk Esterase: x / RBC: x / WBC x   Sq Epi: x / Non Sq Epi: x / Bacteria: x        MICROBIOLOGY:  v  Clean Catch Clean Catch (Midstream)  05-02-25   >100,000 CFU/ml Escherichia coli ESBL  <10,000 CFU/ml Normal Urogenital jose antonio present  --  Escherichia coli ESBL      Blood Blood-Peripheral  05-02-25   No growth at 4 days  --  --      Blood Blood-Peripheral  05-02-25   No growth at 4 days  --  --                RADIOLOGY:  Images independently visualized and reviewed personally, findings as below  < from: Xray Cinesophagram Swallow Function w/ Contrast (05.06.25 @ 10:22) >  IMPRESSION:    Penetration and aspiration.    For further information and recommendations, please refer to the speech   pathologist final report which is available for review in the electronic   medical record.      < end of copied text >  < from: CT Chest No Cont (05.04.25 @ 18:04) >    IMPRESSION:.    No evidence of pneumonia.    < end of copied text >  
  Follow Up:  AMS, ?UTI    Interval History/ROS: pt afebrile, urine cx with ESBL E-coli which is I to erta            Allergies  cefazolin (Flushing)        ANTIMICROBIALS:  meropenem  IVPB 1000 every 8 hours      OTHER MEDS:  acetaminophen   IVPB .. 1000 milliGRAM(s) IV Intermittent once  amLODIPine   Tablet 10 milliGRAM(s) Oral daily  artificial  tears Solution 1 Drop(s) Both EYES two times a day  aspirin  chewable 81 milliGRAM(s) Oral daily  atorvastatin 80 milliGRAM(s) Oral at bedtime  bisacodyl Suppository 10 milliGRAM(s) Rectal daily PRN  famotidine    Tablet 20 milliGRAM(s) Oral daily  glucagon  Injectable 1 milliGRAM(s) IntraMuscular once  heparin   Injectable 5000 Unit(s) SubCutaneous every 12 hours  levothyroxine 88 MICROGram(s) Oral daily  losartan 50 milliGRAM(s) Oral daily  metoprolol tartrate 25 milliGRAM(s) Enteral Tube two times a day  multivitamin 1 Tablet(s) Oral daily  polyethylene glycol 3350 17 Gram(s) Oral daily  senna 2 Tablet(s) Oral at bedtime PRN      Vital Signs Last 24 Hrs  T(C): 36.6 (05 May 2025 04:00), Max: 36.8 (04 May 2025 18:55)  T(F): 97.9 (05 May 2025 04:00), Max: 98.3 (04 May 2025 18:55)  HR: 84 (05 May 2025 04:00) (77 - 91)  BP: 117/70 (05 May 2025 04:00) (112/75 - 130/81)  BP(mean): --  RR: 18 (05 May 2025 04:00) (18 - 18)  SpO2: 96% (05 May 2025 04:00) (91% - 98%)    Parameters below as of 05 May 2025 04:00  Patient On (Oxygen Delivery Method): room air        Physical Exam:  General:    NAD, non toxic  Respiratory:   comfortable on RA  abd:   soft, PEG, not tender  :   no flores  Musculoskeletal : no joint swelling  Skin: improved erythema of face, neck, torso and UEs  vascular: no phlebitis                          13.4   9.75  )-----------( 247      ( 05 May 2025 06:35 )             41.1       05-05    143  |  109[H]  |  29[H]  ----------------------------<  157[H]  4.0   |  22  |  0.54    Ca    9.5      05 May 2025 06:35        Urinalysis Basic - ( 05 May 2025 06:35 )    Color: x / Appearance: x / SG: x / pH: x  Gluc: 157 mg/dL / Ketone: x  / Bili: x / Urobili: x   Blood: x / Protein: x / Nitrite: x   Leuk Esterase: x / RBC: x / WBC x   Sq Epi: x / Non Sq Epi: x / Bacteria: x        MICROBIOLOGY:  v  Clean Catch Clean Catch (Midstream)  05-02-25   >100,000 CFU/ml Escherichia coli ESBL  <10,000 CFU/ml Normal Urogenital jose antonio present  --  Escherichia coli ESBL      Blood Blood-Peripheral  05-02-25   No growth at 72 Hours  --  --      Blood Blood-Peripheral  05-02-25   No growth at 72 Hours  --  --                RADIOLOGY:  Images independently visualized and reviewed personally, findings as below  < from: CT Chest No Cont (05.04.25 @ 18:04) >  IMPRESSION:.    No evidence of pneumonia.    < end of copied text >  < from: CT Head No Cont (05.02.25 @ 02:43) >  IMPRESSION:  No acute intracranial hemorrhage, mass effect, or midline shift.    < end of copied text >  
  Follow Up:  AMS, ?UTI    Interval History/ROS: pt afebrile, less red, blood cx negative for now      Allergies  cefazolin (Flushing)        ANTIMICROBIALS:  ertapenem  IVPB 1000 every 24 hours      OTHER MEDS:  acetaminophen   IVPB .. 1000 milliGRAM(s) IV Intermittent once  amLODIPine   Tablet 10 milliGRAM(s) Oral daily  artificial  tears Solution 1 Drop(s) Both EYES two times a day  aspirin  chewable 81 milliGRAM(s) Oral daily  atorvastatin 80 milliGRAM(s) Oral at bedtime  bisacodyl Suppository 10 milliGRAM(s) Rectal daily PRN  dextrose 5%. 1000 milliLiter(s) IV Continuous <Continuous>  dextrose 5%. 1000 milliLiter(s) IV Continuous <Continuous>  dextrose 50% Injectable 25 Gram(s) IV Push once  dextrose 50% Injectable 12.5 Gram(s) IV Push once  dextrose 50% Injectable 25 Gram(s) IV Push once  dextrose Oral Gel 15 Gram(s) Oral once PRN  famotidine    Tablet 20 milliGRAM(s) Oral daily  glucagon  Injectable 1 milliGRAM(s) IntraMuscular once  heparin   Injectable 5000 Unit(s) SubCutaneous every 12 hours  insulin lispro (ADMELOG) corrective regimen sliding scale   SubCutaneous every 6 hours  levothyroxine 88 MICROGram(s) Oral daily  losartan 50 milliGRAM(s) Oral daily  melatonin 3 milliGRAM(s) Oral once  metoprolol tartrate 25 milliGRAM(s) Enteral Tube two times a day  multivitamin 1 Tablet(s) Oral daily  polyethylene glycol 3350 17 Gram(s) Oral daily  senna 2 Tablet(s) Oral at bedtime PRN      Vital Signs Last 24 Hrs  T(C): 36.7 (03 May 2025 11:09), Max: 37.6 (03 May 2025 04:02)  T(F): 98 (03 May 2025 11:09), Max: 99.6 (03 May 2025 04:02)  HR: 84 (03 May 2025 11:09) (72 - 103)  BP: 116/71 (03 May 2025 11:09) (108/64 - 138/80)  BP(mean): --  RR: 18 (03 May 2025 11:09) (17 - 18)  SpO2: 95% (03 May 2025 11:09) (92% - 96%)    Parameters below as of 03 May 2025 11:09  Patient On (Oxygen Delivery Method): room air        Physical Exam:  General:    NAD, non toxic  Respiratory:   comfortable on RA  abd:   soft, PEG, not tender  :   no flores  Musculoskeletal : no joint swelling  Skin: improved erythema of face, neck, torso and UEs  vascular: no phlebitis                              12.9   8.67  )-----------( 251      ( 03 May 2025 06:20 )             40.3       05-03    142  |  105  |  26[H]  ----------------------------<  155[H]  4.1   |  24  |  0.57    Ca    9.9      03 May 2025 06:21  Phos  2.9     05-02  Mg     2.1     05-02    TPro  6.9  /  Alb  3.3  /  TBili  0.7  /  DBili  x   /  AST  12  /  ALT  10  /  AlkPhos  104  05-02      Urinalysis Basic - ( 03 May 2025 06:21 )    Color: x / Appearance: x / SG: x / pH: x  Gluc: 155 mg/dL / Ketone: x  / Bili: x / Urobili: x   Blood: x / Protein: x / Nitrite: x   Leuk Esterase: x / RBC: x / WBC x   Sq Epi: x / Non Sq Epi: x / Bacteria: x        MICROBIOLOGY:  v  Blood Blood-Peripheral  05-02-25   No growth at 24 hours  --  --      Blood Blood-Peripheral  05-02-25   No growth at 24 hours  --  --                RADIOLOGY:  Images independently visualized and reviewed personally, findings as below  < from: CT Head No Cont (05.02.25 @ 02:43) >  IMPRESSION:  No acute intracranial hemorrhage, mass effect, or midline shift.    < end of copied text >  < from: Xray Chest 1 View- PORTABLE-Urgent (05.02.25 @ 01:46) >  IMPRESSION:  Clear lungs.      < end of copied text >  
Subjective: Patient seen and examined. No new events except as noted.   Awaiting dc     REVIEW OF SYSTEMS:  unable to obtain    MEDICATIONS:  MEDICATIONS  (STANDING):  acetaminophen   IVPB .. 1000 milliGRAM(s) IV Intermittent once  amLODIPine   Tablet 10 milliGRAM(s) Oral daily  artificial  tears Solution 1 Drop(s) Both EYES two times a day  aspirin  chewable 81 milliGRAM(s) Oral daily  atorvastatin 80 milliGRAM(s) Oral at bedtime  famotidine    Tablet 20 milliGRAM(s) Oral daily  glucagon  Injectable 1 milliGRAM(s) IntraMuscular once  heparin   Injectable 5000 Unit(s) SubCutaneous every 12 hours  levothyroxine 88 MICROGram(s) Oral daily  losartan 50 milliGRAM(s) Oral daily  metoprolol tartrate 25 milliGRAM(s) Enteral Tube two times a day  multivitamin 1 Tablet(s) Oral daily  polyethylene glycol 3350 17 Gram(s) Oral daily      PHYSICAL EXAM:  T(C): 37.1 (05-08-25 @ 11:07), Max: 37.1 (05-08-25 @ 11:07)  HR: 72 (05-08-25 @ 11:07) (72 - 82)  BP: 104/60 (05-08-25 @ 11:07) (104/60 - 138/81)  RR: 18 (05-08-25 @ 11:07) (18 - 18)  SpO2: 94% (05-08-25 @ 11:07) (94% - 97%)  Wt(kg): --  I&O's Summary    07 May 2025 07:01  -  08 May 2025 07:00  --------------------------------------------------------  IN: 750 mL / OUT: 1000 mL / NET: -250 mL          Appearance: NAD elderly 	  HEENT:   Dry  oral mucosa, PERRL, EOMI	  Lymphatic: No lymphadenopathy  Cardiovascular: Normal S1 S2, No JVD, No murmurs, No edema  Respiratory: decreased bs 	  Psychiatry: A & O x 2  Gastrointestinal:  Soft, Non-tender, + PEG 	  Skin: No rashes, No ecchymoses, No cyanosis	  Neurologic: Right sided hemiplegia   Extremities: Normal range of motion, No clubbing, cyanosis or edema  Vascular: Peripheral pulses palpable 2+ bilaterally          LABS:    CARDIAC MARKERS:                  proBNP:   Lipid Profile:   HgA1c:   TSH:             TELEMETRY: 	    ECG:  	  RADIOLOGY:   DIAGNOSTIC TESTING:  [ ] Echocardiogram:  [ ]  Catheterization:  [ ] Stress Test:    OTHER: 	          
Subjective: Patient seen and examined. No new events except as noted.     REVIEW OF SYSTEMS:    CONSTITUTIONAL: No weakness, fevers or chills  EYES/ENT: No visual changes;  No vertigo or throat pain   NECK: No pain or stiffness  RESPIRATORY: No cough, wheezing, hemoptysis; No shortness of breath  CARDIOVASCULAR: No chest pain or palpitations  GASTROINTESTINAL: No abdominal or epigastric pain. No nausea, vomiting, or hematemesis; No diarrhea or constipation. No melena or hematochezia.  GENITOURINARY: No dysuria, frequency or hematuria  NEUROLOGICAL: No numbness or weakness  SKIN: No itching, burning, rashes, or lesions   All other review of systems is negative unless indicated above.    MEDICATIONS:  MEDICATIONS  (STANDING):  acetaminophen   IVPB .. 1000 milliGRAM(s) IV Intermittent once  amLODIPine   Tablet 10 milliGRAM(s) Oral daily  artificial  tears Solution 1 Drop(s) Both EYES two times a day  aspirin  chewable 81 milliGRAM(s) Oral daily  atorvastatin 80 milliGRAM(s) Oral at bedtime  ertapenem  IVPB 1000 milliGRAM(s) IV Intermittent every 24 hours  famotidine    Tablet 20 milliGRAM(s) Oral daily  glucagon  Injectable 1 milliGRAM(s) IntraMuscular once  heparin   Injectable 5000 Unit(s) SubCutaneous every 12 hours  levothyroxine 88 MICROGram(s) Oral daily  losartan 50 milliGRAM(s) Oral daily  metoprolol tartrate 25 milliGRAM(s) Enteral Tube two times a day  multivitamin 1 Tablet(s) Oral daily  polyethylene glycol 3350 17 Gram(s) Oral daily      PHYSICAL EXAM:  T(C): 36.7 (05-04-25 @ 04:00), Max: 37.3 (05-03-25 @ 21:12)  HR: 81 (05-04-25 @ 04:00) (77 - 88)  BP: 120/79 (05-04-25 @ 04:00) (108/70 - 134/77)  RR: 18 (05-04-25 @ 04:00) (18 - 18)  SpO2: 97% (05-04-25 @ 04:00) (95% - 97%)  Wt(kg): --  I&O's Summary    03 May 2025 07:01  -  04 May 2025 07:00  --------------------------------------------------------  IN: 0 mL / OUT: 300 mL / NET: -300 mL          Appearance: NAD elderly 	  HEENT:   Dry  oral mucosa, PERRL, EOMI	  Lymphatic: No lymphadenopathy  Cardiovascular: Normal S1 S2, No JVD, No murmurs, No edema  Respiratory: decreased bs 	  Psychiatry: A & O x 2  Gastrointestinal:  Soft, Non-tender, + PEG 	  Skin: No rashes, No ecchymoses, No cyanosis	  Neurologic: Right sided hemiplegia   Extremities: Normal range of motion, No clubbing, cyanosis or edema  Vascular: Peripheral pulses palpable 2+ bilaterally        LABS:    CARDIAC MARKERS:                                13.5   10.07 )-----------( 236      ( 04 May 2025 06:18 )             42.4     05-04    139  |  104  |  30[H]  ----------------------------<  131[H]  3.9   |  23  |  0.58    Ca    9.6      04 May 2025 06:18      proBNP:   Lipid Profile:   HgA1c:   TSH:     Negative          TELEMETRY: 	SR    ECG:  	  RADIOLOGY:   DIAGNOSTIC TESTING:  [ ] Echocardiogram:  [ ]  Catheterization:  [ ] Stress Test:    OTHER: 	          
Subjective: Patient seen and examined. No new events except as noted.   Aide at bedside  Waiting to go home    REVIEW OF SYSTEMS:    CONSTITUTIONAL: No weakness, fevers or chills  EYES/ENT: No visual changes;  No vertigo or throat pain   NECK: No pain or stiffness  RESPIRATORY: No cough, wheezing, hemoptysis; No shortness of breath  CARDIOVASCULAR: No chest pain or palpitations  GASTROINTESTINAL: No abdominal or epigastric pain. No nausea, vomiting, or hematemesis; No diarrhea or constipation. No melena or hematochezia.  GENITOURINARY: No dysuria, frequency or hematuria  NEUROLOGICAL: No numbness or weakness  SKIN: No itching, burning, rashes, or lesions   All other review of systems is negative unless indicated above.    MEDICATIONS:  MEDICATIONS  (STANDING):  acetaminophen   IVPB .. 1000 milliGRAM(s) IV Intermittent once  amLODIPine   Tablet 10 milliGRAM(s) Oral daily  artificial  tears Solution 1 Drop(s) Both EYES two times a day  aspirin  chewable 81 milliGRAM(s) Oral daily  atorvastatin 80 milliGRAM(s) Oral at bedtime  famotidine    Tablet 20 milliGRAM(s) Oral daily  glucagon  Injectable 1 milliGRAM(s) IntraMuscular once  heparin   Injectable 5000 Unit(s) SubCutaneous every 12 hours  levothyroxine 88 MICROGram(s) Oral daily  losartan 50 milliGRAM(s) Oral daily  metoprolol tartrate 25 milliGRAM(s) Enteral Tube two times a day  multivitamin 1 Tablet(s) Oral daily  polyethylene glycol 3350 17 Gram(s) Oral daily      PHYSICAL EXAM:  T(C): 36.5 (05-07-25 @ 04:00), Max: 36.6 (05-06-25 @ 11:03)  HR: 74 (05-07-25 @ 04:00) (74 - 93)  BP: 122/76 (05-07-25 @ 04:00) (120/78 - 133/72)  RR: 18 (05-07-25 @ 04:00) (18 - 18)  SpO2: 94% (05-07-25 @ 04:00) (94% - 96%)  Wt(kg): --  I&O's Summary    06 May 2025 07:01  -  07 May 2025 07:00  --------------------------------------------------------  IN: 0 mL / OUT: 300 mL / NET: -300 mL        Appearance: NAD elderly 	  HEENT:   Dry  oral mucosa, PERRL, EOMI	  Lymphatic: No lymphadenopathy  Cardiovascular: Normal S1 S2, No JVD, No murmurs, No edema  Respiratory: decreased bs 	  Psychiatry: A & O x 2  Gastrointestinal:  Soft, Non-tender, + PEG 	  Skin: No rashes, No ecchymoses, No cyanosis	  Neurologic: Right sided hemiplegia   Extremities: Normal range of motion, No clubbing, cyanosis or edema  Vascular: Peripheral pulses palpable 2+ bilaterally        LABS:    CARDIAC MARKERS:                  proBNP:   Lipid Profile:   HgA1c:   TSH:             TELEMETRY: 	    ECG:  	  RADIOLOGY:   DIAGNOSTIC TESTING:  [ ] Echocardiogram:  [ ]  Catheterization:  [ ] Stress Test:    OTHER: 	          
Subjective: Patient seen and examined. No new events except as noted.     REVIEW OF SYSTEMS:    CONSTITUTIONAL: No weakness, fevers or chills  EYES/ENT: No visual changes;  No vertigo or throat pain   NECK: No pain or stiffness  RESPIRATORY: No cough, wheezing, hemoptysis; No shortness of breath  CARDIOVASCULAR: No chest pain or palpitations  GASTROINTESTINAL: No abdominal or epigastric pain. No nausea, vomiting, or hematemesis; No diarrhea or constipation. No melena or hematochezia.  GENITOURINARY: No dysuria, frequency or hematuria  NEUROLOGICAL: No numbness or weakness  SKIN: No itching, burning, rashes, or lesions   All other review of systems is negative unless indicated above.    MEDICATIONS:  MEDICATIONS  (STANDING):  acetaminophen   IVPB .. 1000 milliGRAM(s) IV Intermittent once  amLODIPine   Tablet 10 milliGRAM(s) Oral daily  artificial  tears Solution 1 Drop(s) Both EYES two times a day  aspirin  chewable 81 milliGRAM(s) Oral daily  atorvastatin 80 milliGRAM(s) Oral at bedtime  famotidine    Tablet 20 milliGRAM(s) Oral daily  glucagon  Injectable 1 milliGRAM(s) IntraMuscular once  heparin   Injectable 5000 Unit(s) SubCutaneous every 12 hours  levothyroxine 88 MICROGram(s) Oral daily  losartan 50 milliGRAM(s) Oral daily  meropenem  IVPB 1000 milliGRAM(s) IV Intermittent every 8 hours  metoprolol tartrate 25 milliGRAM(s) Enteral Tube two times a day  multivitamin 1 Tablet(s) Oral daily  polyethylene glycol 3350 17 Gram(s) Oral daily      PHYSICAL EXAM:  T(C): 36.7 (05-06-25 @ 09:27), Max: 36.8 (05-05-25 @ 16:38)  HR: 77 (05-06-25 @ 09:27) (77 - 87)  BP: 119/75 (05-06-25 @ 09:27) (115/72 - 147/69)  RR: 18 (05-06-25 @ 09:27) (18 - 18)  SpO2: 96% (05-06-25 @ 09:27) (95% - 97%)  Wt(kg): --  I&O's Summary    05 May 2025 07:01  -  06 May 2025 07:00  --------------------------------------------------------  IN: 600 mL / OUT: 1300 mL / NET: -700 mL          Appearance: NAD elderly 	  HEENT:   Dry  oral mucosa, PERRL, EOMI	  Lymphatic: No lymphadenopathy  Cardiovascular: Normal S1 S2, No JVD, No murmurs, No edema  Respiratory: decreased bs 	  Psychiatry: A & O x 2  Gastrointestinal:  Soft, Non-tender, + PEG 	  Skin: No rashes, No ecchymoses, No cyanosis	  Neurologic: Right sided hemiplegia   Extremities: Normal range of motion, No clubbing, cyanosis or edema  Vascular: Peripheral pulses palpable 2+ bilaterally    LABS:    CARDIAC MARKERS:                                13.4   9.75  )-----------( 247      ( 05 May 2025 06:35 )             41.1     05-05    143  |  109[H]  |  29[H]  ----------------------------<  157[H]  4.0   |  22  |  0.54    Ca    9.5      05 May 2025 06:35      proBNP:   Lipid Profile:   HgA1c:   TSH:             TELEMETRY: 	    ECG:  	  RADIOLOGY:   DIAGNOSTIC TESTING:  [ ] Echocardiogram:  [ ]  Catheterization:  [ ] Stress Test:    OTHER: 	          
CARLOS DEVRIES  89y  Female      Patient is a 89y old  Female who presents with a chief complaint of uti/ change of mental status (03 May 2025 22:11)      INTERVAL HPI/OVERNIGHT EVENTS:        REVIEW OF SYSTEMS:  CONSTITUTIONAL: No fever, weight loss, or fatigue  EYES: No eye pain, visual disturbances, or discharge  ENMT:   No sinus or throat pain  NECK: No pain or stiffness  BREASTS: No pain, masses, or nipple discharge  RESPIRATORY: No cough, wheezing, chills or hemoptysis; No shortness of breath  CARDIOVASCULAR: No chest pain, palpitations, dizziness, or leg swelling  GASTROINTESTINAL: No abdominal or epigastric pain. No nausea, vomiting, or hematemesis; No diarrhea or constipation. No melena or hematochezia.  GENITOURINARY: No hematuria  NEUROLOGICAL: demented    SKIN: No itching, burning, rashes, or lesions   LYMPH NODES: No enlarged glands  ENDOCRINE: No heat or cold intolerance; No hair loss  MUSCULOSKELETAL: No joint pain    HEME/LYMPH: No easy bruising, or bleeding gums  ALLERY AND IMMUNOLOGIC: No hives or eczema    T(C): 37.3 (05-03-25 @ 21:12), Max: 37.6 (05-03-25 @ 04:02)  HR: 78 (05-03-25 @ 21:12) (72 - 92)  BP: 118/73 (05-03-25 @ 21:12) (108/64 - 125/73)  RR: 18 (05-03-25 @ 21:12) (17 - 18)  SpO2: 95% (05-03-25 @ 21:12) (92% - 96%)  Wt(kg): --Vital Signs Last 24 Hrs  T(C): 37.3 (03 May 2025 21:12), Max: 37.6 (03 May 2025 04:02)  T(F): 99.1 (03 May 2025 21:12), Max: 99.6 (03 May 2025 04:02)  HR: 78 (03 May 2025 21:12) (72 - 92)  BP: 118/73 (03 May 2025 21:12) (108/64 - 125/73)  BP(mean): --  RR: 18 (03 May 2025 21:12) (17 - 18)  SpO2: 95% (03 May 2025 21:12) (92% - 96%)    Parameters below as of 03 May 2025 21:12  Patient On (Oxygen Delivery Method): room air        PHYSICAL EXAM:  GENERAL: NAD, well-groomed, well-developed  HEAD:  Atraumatic, Normocephalic  EYES: EOMI, PERRLA, conjunctiva and sclera clear  ENMT: No tonsillar erythema, exudates, or enlargement; Moist mucous membranes, Good dentition, No lesions  NECK: Supple, No JVD, Normal thyroid  NERVOUS SYSTEM:  demented.no focal neurodeficit.  CHEST/LUNG: Clear to percussion bilaterally; No rales, rhonchi, wheezing, or rubs  HEART: Regular rate and rhythm; No murmurs, rubs, or gallops  ABDOMEN: Soft, Nontender, Nondistended; Bowel sounds present  EXTREMITIES:  2+ Peripheral Pulses, No clubbing, cyanosis.  LYMPH: No lymphadenopathy noted  SKIN: No rashes or lesions    Consultant(s) Notes Reviewed:  [x ] YES  [ ] NO  Care Discussed with Consultants/Other Providers [ x] YES  [ ] NO    LABS:                        12.9   8.67  )-----------( 251      ( 03 May 2025 06:20 )             40.3     05-03    142  |  105  |  26[H]  ----------------------------<  155[H]  4.1   |  24  |  0.57    Ca    9.9      03 May 2025 06:21  Phos  2.9     05-02  Mg     2.1     05-02    TPro  6.9  /  Alb  3.3  /  TBili  0.7  /  DBili  x   /  AST  12  /  ALT  10  /  AlkPhos  104  05-02    PT/INR - ( 02 May 2025 02:07 )   PT: 12.9 sec;   INR: 1.12 ratio         PTT - ( 02 May 2025 02:07 )  PTT:26.5 sec  Urinalysis Basic - ( 03 May 2025 06:21 )    Color: x / Appearance: x / SG: x / pH: x  Gluc: 155 mg/dL / Ketone: x  / Bili: x / Urobili: x   Blood: x / Protein: x / Nitrite: x   Leuk Esterase: x / RBC: x / WBC x   Sq Epi: x / Non Sq Epi: x / Bacteria: x      CAPILLARY BLOOD GLUCOSE      POCT Blood Glucose.: 151 mg/dL (03 May 2025 11:58)  POCT Blood Glucose.: 146 mg/dL (03 May 2025 06:52)  POCT Blood Glucose.: 125 mg/dL (02 May 2025 23:47)        Urinalysis Basic - ( 03 May 2025 06:21 )    Color: x / Appearance: x / SG: x / pH: x  Gluc: 155 mg/dL / Ketone: x  / Bili: x / Urobili: x   Blood: x / Protein: x / Nitrite: x   Leuk Esterase: x / RBC: x / WBC x   Sq Epi: x / Non Sq Epi: x / Bacteria: x        RADIOLOGY & ADDITIONAL TESTS:    Imaging Personally Reviewed:  x] YES  [ ] NO
CARLOS DEVRIES  89y  Female      Patient is a 89y old  Female who presents with a chief complaint of uti/ change of mental status (04 May 2025 09:43)      INTERVAL HPI/OVERNIGHT EVENTS:        REVIEW OF SYSTEMS:  CONSTITUTIONAL: No fever, weight loss, or fatigue  EYES: No eye pain, visual disturbances, or discharge  ENMT:   No sinus or throat pain  NECK: No pain or stiffness  BREASTS: No pain, masses, or nipple discharge  RESPIRATORY: + cough, wheezing, chills or hemoptysis; No shortness of breath  CARDIOVASCULAR: No chest pain, palpitations, dizziness, or leg swelling  GASTROINTESTINAL: No abdominal or epigastric pain. No nausea, vomiting, or hematemesis; No diarrhea or constipation. No melena or hematochezia.  GENITOURINARY: No hematuria  NEUROLOGICAL: demented    SKIN: No itching, burning, rashes, or lesions   LYMPH NODES: No enlarged glands  ENDOCRINE: No heat or cold intolerance; No hair loss  MUSCULOSKELETAL: No joint pain    HEME/LYMPH: No easy bruising, or bleeding gums  ALLERY AND IMMUNOLOGIC: No hives or eczema    T(C): 36.7 (05-04-25 @ 11:12), Max: 37.3 (05-03-25 @ 21:12)  HR: 77 (05-04-25 @ 11:12) (77 - 88)  BP: 112/75 (05-04-25 @ 11:12) (108/70 - 134/77)  RR: 18 (05-04-25 @ 11:28) (18 - 18)  SpO2: 98% (05-04-25 @ 11:28) (91% - 98%)  Wt(kg): --Vital Signs Last 24 Hrs  T(C): 36.7 (04 May 2025 11:12), Max: 37.3 (03 May 2025 21:12)  T(F): 98 (04 May 2025 11:12), Max: 99.1 (03 May 2025 21:12)  HR: 77 (04 May 2025 11:12) (77 - 88)  BP: 112/75 (04 May 2025 11:12) (108/70 - 134/77)  BP(mean): --  RR: 18 (04 May 2025 11:28) (18 - 18)  SpO2: 98% (04 May 2025 11:28) (91% - 98%)    Parameters below as of 04 May 2025 11:28  Patient On (Oxygen Delivery Method): room air        PHYSICAL EXAM:  GENERAL: NAD, well-groomed, well-developed  HEAD:  Atraumatic, Normocephalic  EYES: EOMI, PERRLA, conjunctiva and sclera clear  ENMT: No tonsillar erythema, exudates, or enlargement; Moist mucous membranes, Good dentition, No lesions  NECK: Supple, No JVD, Normal thyroid  NERVOUS SYSTEM:  demented.no focal neurodeficit.  Chest    Left basal rales  HEART: Regular rate and rhythm; No murmurs, rubs, or gallops  ABDOMEN: Soft, Nontender, Nondistended; Bowel sounds present  EXTREMITIES:  2+ Peripheral Pulses, No clubbing, cyanosis.  LYMPH: No lymphadenopathy noted  SKIN: No rashes or lesions    Consultant(s) Notes Reviewed:  [x ] YES  [ ] NO  Care Discussed with Consultants/Other Providers [ x] YES  [ ] NO    LABS:                        13.5   10.07 )-----------( 236      ( 04 May 2025 06:18 )             42.4     05-04    139  |  104  |  30[H]  ----------------------------<  131[H]  3.9   |  23  |  0.58    Ca    9.6      04 May 2025 06:18        Urinalysis Basic - ( 04 May 2025 06:18 )    Color: x / Appearance: x / SG: x / pH: x  Gluc: 131 mg/dL / Ketone: x  / Bili: x / Urobili: x   Blood: x / Protein: x / Nitrite: x   Leuk Esterase: x / RBC: x / WBC x   Sq Epi: x / Non Sq Epi: x / Bacteria: x      CAPILLARY BLOOD GLUCOSE      POCT Blood Glucose.: 114 mg/dL (04 May 2025 11:51)  POCT Blood Glucose.: 116 mg/dL (04 May 2025 07:24)        Urinalysis Basic - ( 04 May 2025 06:18 )    Color: x / Appearance: x / SG: x / pH: x  Gluc: 131 mg/dL / Ketone: x  / Bili: x / Urobili: x   Blood: x / Protein: x / Nitrite: x   Leuk Esterase: x / RBC: x / WBC x   Sq Epi: x / Non Sq Epi: x / Bacteria: x        RADIOLOGY & ADDITIONAL TESTS:    Imaging Personally Reviewed:  [ ] YES  [ ] NO
Subjective: Patient seen and examined. No new events except as noted.     REVIEW OF SYSTEMS:    CONSTITUTIONAL: + weakness, fevers or chills  EYES/ENT: No visual changes;  No vertigo or throat pain   NECK: No pain or stiffness  RESPIRATORY: No cough, wheezing, hemoptysis; No shortness of breath  CARDIOVASCULAR: No chest pain or palpitations  GASTROINTESTINAL: No abdominal or epigastric pain. No nausea, vomiting, or hematemesis; No diarrhea or constipation. No melena or hematochezia.  GENITOURINARY: No dysuria, frequency or hematuria  NEUROLOGICAL: + numbness or weakness  SKIN: No itching, burning, rashes, or lesions   All other review of systems is negative unless indicated above.    MEDICATIONS:  MEDICATIONS  (STANDING):  acetaminophen   IVPB .. 1000 milliGRAM(s) IV Intermittent once  amLODIPine   Tablet 10 milliGRAM(s) Oral daily  artificial  tears Solution 1 Drop(s) Both EYES two times a day  aspirin  chewable 81 milliGRAM(s) Oral daily  atorvastatin 80 milliGRAM(s) Oral at bedtime  ertapenem  IVPB 1000 milliGRAM(s) IV Intermittent every 24 hours  famotidine    Tablet 20 milliGRAM(s) Oral daily  glucagon  Injectable 1 milliGRAM(s) IntraMuscular once  heparin   Injectable 5000 Unit(s) SubCutaneous every 12 hours  levothyroxine 88 MICROGram(s) Oral daily  losartan 50 milliGRAM(s) Oral daily  melatonin 3 milliGRAM(s) Oral once  metoprolol tartrate 25 milliGRAM(s) Enteral Tube two times a day  multivitamin 1 Tablet(s) Oral daily  polyethylene glycol 3350 17 Gram(s) Oral daily      PHYSICAL EXAM:  T(C): 37.3 (05-03-25 @ 21:12), Max: 37.6 (05-03-25 @ 04:02)  HR: 78 (05-03-25 @ 21:12) (72 - 92)  BP: 118/73 (05-03-25 @ 21:12) (108/64 - 125/73)  RR: 18 (05-03-25 @ 21:12) (17 - 18)  SpO2: 95% (05-03-25 @ 21:12) (92% - 96%)  Wt(kg): --  I&O's Summary    02 May 2025 07:01  -  03 May 2025 07:00  --------------------------------------------------------  IN: 540 mL / OUT: 1450 mL / NET: -910 mL    03 May 2025 07:01  -  03 May 2025 22:11  --------------------------------------------------------  IN: 0 mL / OUT: 300 mL / NET: -300 mL            Appearance: NAD elderly 	  HEENT:   Dry  oral mucosa, PERRL, EOMI	  Lymphatic: No lymphadenopathy  Cardiovascular: Normal S1 S2, No JVD, No murmurs, No edema  Respiratory: decreased bs 	  Psychiatry: A & O x 2  Gastrointestinal:  Soft, Non-tender, + PEG 	  Skin: No rashes, No ecchymoses, No cyanosis	  Neurologic: Right sided hemiplegia   Extremities: Normal range of motion, No clubbing, cyanosis or edema  Vascular: Peripheral pulses palpable 2+ bilaterally      LABS:    CARDIAC MARKERS:                                12.9   8.67  )-----------( 251      ( 03 May 2025 06:20 )             40.3     05-03    142  |  105  |  26[H]  ----------------------------<  155[H]  4.1   |  24  |  0.57    Ca    9.9      03 May 2025 06:21  Phos  2.9     05-02  Mg     2.1     05-02    TPro  6.9  /  Alb  3.3  /  TBili  0.7  /  DBili  x   /  AST  12  /  ALT  10  /  AlkPhos  104  05-02      TELEMETRY: 	    ECG:  	  RADIOLOGY:   DIAGNOSTIC TESTING:  [ ] Echocardiogram:  [ ]  Catheterization:  [ ] Stress Test:    OTHER: 	          
Subjective: Patient seen and examined. No new events except as noted.   Aide at bedside    REVIEW OF SYSTEMS:  unable to obtain    MEDICATIONS:  MEDICATIONS  (STANDING):  acetaminophen   IVPB .. 1000 milliGRAM(s) IV Intermittent once  amLODIPine   Tablet 10 milliGRAM(s) Oral daily  artificial  tears Solution 1 Drop(s) Both EYES two times a day  aspirin  chewable 81 milliGRAM(s) Oral daily  atorvastatin 80 milliGRAM(s) Oral at bedtime  famotidine    Tablet 20 milliGRAM(s) Oral daily  glucagon  Injectable 1 milliGRAM(s) IntraMuscular once  heparin   Injectable 5000 Unit(s) SubCutaneous every 12 hours  levothyroxine 88 MICROGram(s) Oral daily  losartan 50 milliGRAM(s) Oral daily  meropenem  IVPB 1000 milliGRAM(s) IV Intermittent every 8 hours  metoprolol tartrate 25 milliGRAM(s) Enteral Tube two times a day  multivitamin 1 Tablet(s) Oral daily  polyethylene glycol 3350 17 Gram(s) Oral daily      PHYSICAL EXAM:  T(C): 36.6 (05-05-25 @ 04:00), Max: 36.8 (05-04-25 @ 18:55)  HR: 84 (05-05-25 @ 04:00) (77 - 91)  BP: 117/70 (05-05-25 @ 04:00) (112/75 - 130/81)  RR: 18 (05-05-25 @ 04:00) (18 - 18)  SpO2: 96% (05-05-25 @ 04:00) (91% - 98%)  Wt(kg): --  I&O's Summary    04 May 2025 07:01  -  05 May 2025 07:00  --------------------------------------------------------  IN: 0 mL / OUT: 400 mL / NET: -400 mL    05 May 2025 07:01  -  05 May 2025 11:08  --------------------------------------------------------  IN: 0 mL / OUT: 0 mL / NET: 0 mL          Appearance: NAD elderly 	  HEENT:   Dry  oral mucosa, PERRL, EOMI	  Lymphatic: No lymphadenopathy  Cardiovascular: Normal S1 S2, No JVD, No murmurs, No edema  Respiratory: decreased bs 	  Psychiatry: A & O x 2  Gastrointestinal:  Soft, Non-tender, + PEG 	  Skin: No rashes, No ecchymoses, No cyanosis	  Neurologic: Right sided hemiplegia   Extremities: Normal range of motion, No clubbing, cyanosis or edema  Vascular: Peripheral pulses palpable 2+ bilaterally      LABS:    CARDIAC MARKERS:                                13.4   9.75  )-----------( 247      ( 05 May 2025 06:35 )             41.1     05-05    143  |  109[H]  |  29[H]  ----------------------------<  157[H]  4.0   |  22  |  0.54    Ca    9.5      05 May 2025 06:35      proBNP:   Lipid Profile:   HgA1c:   TSH:             TELEMETRY: 	  SR  ECG:  	  RADIOLOGY:   < from: CT Chest No Cont (05.04.25 @ 18:04) >    ACC: [id number] EXAM:  CT CHEST   ORDERED BY:  RELL PEREZ     PROCEDURE DATE:  05/04/2025          INTERPRETATION:  HISTORY: Admitting Dxs: N39.0 URINARY TRACT INFECTION,   SITE NOT SPECIFIED. Rule out pneumonia.    EXAMINATION: CT CHEST was performed without IV contrast.    COMPARISON: No prior CT chest comparison.    FINDINGS:    AIRWAYS, LUNGS, PLEURA: Clear central airways. No consolidation. Trace   left pleural effusion.    MEDIASTINUM: Normal heart size. No pericardial effusion. Thoracic aorta   normal caliber.  No large mediastinal lymph nodes.    IMAGED ABDOMEN: Hepatic hypodense lesions, likely cysts.    SOFT TISSUES: Unremarkable.    BONES: Unremarkable.      IMPRESSION:.    No evidence of pneumonia.    --- End of Report ---    < end of copied text >  DIAGNOSTIC TESTING:  [ ] Echocardiogram:  [ ]  Catheterization:  [ ] Stress Test:    OTHER: 	  Urinalysis Basic - ( 05 May 2025 06:35 )    Color: x / Appearance: x / SG: x / pH: x  Gluc: 157 mg/dL / Ketone: x  / Bili: x / Urobili: x   Blood: x / Protein: x / Nitrite: x   Leuk Esterase: x / RBC: x / WBC x   Sq Epi: x / Non Sq Epi: x / Bacteria: x        MICROBIOLOGY:  v  Clean Catch Clean Catch (Midstream)  05-02-25   >100,000 CFU/ml Escherichia coli ESBL  <10,000 CFU/ml Normal Urogenital jose antonio present  --  Escherichia coli ESBL      Blood Blood-Peripheral  05-02-25   No growth at 72 Hours  --  --      Blood Blood-Peripheral  05-02-25   No growth at 72 Hours  --  --            
CARLOS DEVRIES  89y  Female      Patient is a 89y old  Female who presents with a chief complaint of uti/ change of mental status (02 May 2025 10:10)      INTERVAL HPI/OVERNIGHT EVENTS:        REVIEW OF SYSTEMS:  CONSTITUTIONAL: No fever, weight loss, or fatigue  EYES: No eye pain, visual disturbances, or discharge  ENMT:   No sinus or throat pain  NECK: No pain or stiffness  BREASTS: No pain, masses, or nipple discharge  RESPIRATORY: No cough, wheezing, chills or hemoptysis; No shortness of breath  CARDIOVASCULAR: No chest pain, palpitations, dizziness, or leg swelling  GASTROINTESTINAL: No abdominal or epigastric pain. No nausea, vomiting, or hematemesis; No diarrhea or constipation. No melena or hematochezia.  GENITOURINARY: No hematuria  NEUROLOGICAL: demented    SKIN: No itching, burning, rashes, or lesions   LYMPH NODES: No enlarged glands  ENDOCRINE: No heat or cold intolerance; No hair loss  MUSCULOSKELETAL: No joint pain    HEME/LYMPH: No easy bruising, or bleeding gums  ALLERY AND IMMUNOLOGIC: No hives or eczema    T(C): 36.6 (05-02-25 @ 10:58), Max: 37.1 (05-02-25 @ 03:31)  HR: 97 (05-02-25 @ 10:58) (88 - 98)  BP: 149/86 (05-02-25 @ 10:58) (134/80 - 152/80)  RR: 18 (05-02-25 @ 10:58) (18 - 22)  SpO2: 94% (05-02-25 @ 10:58) (94% - 99%)  Wt(kg): --Vital Signs Last 24 Hrs  T(C): 36.6 (02 May 2025 10:58), Max: 37.1 (02 May 2025 03:31)  T(F): 97.9 (02 May 2025 10:58), Max: 98.8 (02 May 2025 03:31)  HR: 97 (02 May 2025 10:58) (88 - 98)  BP: 149/86 (02 May 2025 10:58) (134/80 - 152/80)  BP(mean): --  RR: 18 (02 May 2025 10:58) (18 - 22)  SpO2: 94% (02 May 2025 10:58) (94% - 99%)    Parameters below as of 02 May 2025 10:58  Patient On (Oxygen Delivery Method): room air        PHYSICAL EXAM:  GENERAL: NAD, well-groomed, well-developed  HEAD:  Atraumatic, Normocephalic  EYES: EOMI, PERRLA, conjunctiva and sclera clear  ENMT: No tonsillar erythema, exudates, or enlargement; Moist mucous membranes, Good dentition, No lesions  NECK: Supple, No JVD, Normal thyroid  NERVOUS SYSTEM:  demented.no focal neurodeficit.  CHEST/LUNG: Clear to percussion bilaterally; No rales, rhonchi, wheezing, or rubs  HEART: Regular rate and rhythm; No murmurs, rubs, or gallops  ABDOMEN: Soft, Nontender, Nondistended; Bowel sounds present  EXTREMITIES:  2+ Peripheral Pulses, No clubbing, cyanosis.  LYMPH: No lymphadenopathy noted  SKIN: No rashes or lesions    Consultant(s) Notes Reviewed:  [x ] YES  [ ] NO  Care Discussed with Consultants/Other Providers [ x] YES  [ ] NO    LABS:                        13.1   9.99  )-----------( 218      ( 02 May 2025 02:07 )             40.3     05-02    139  |  102  |  21  ----------------------------<  115[H]  3.9   |  23  |  0.55    Ca    9.7      02 May 2025 02:07  Phos  2.9     05-02  Mg     2.1     05-02    TPro  6.9  /  Alb  3.3  /  TBili  0.7  /  DBili  x   /  AST  12  /  ALT  10  /  AlkPhos  104  05-02    PT/INR - ( 02 May 2025 02:07 )   PT: 12.9 sec;   INR: 1.12 ratio         PTT - ( 02 May 2025 02:07 )  PTT:26.5 sec  Urinalysis Basic - ( 02 May 2025 02:07 )    Color: x / Appearance: x / SG: x / pH: x  Gluc: 115 mg/dL / Ketone: x  / Bili: x / Urobili: x   Blood: x / Protein: x / Nitrite: x   Leuk Esterase: x / RBC: x / WBC x   Sq Epi: x / Non Sq Epi: x / Bacteria: x      CAPILLARY BLOOD GLUCOSE      POCT Blood Glucose.: 105 mg/dL (02 May 2025 11:10)  POCT Blood Glucose.: 122 mg/dL (02 May 2025 09:16)        Urinalysis Basic - ( 02 May 2025 02:07 )    Color: x / Appearance: x / SG: x / pH: x  Gluc: 115 mg/dL / Ketone: x  / Bili: x / Urobili: x   Blood: x / Protein: x / Nitrite: x   Leuk Esterase: x / RBC: x / WBC x   Sq Epi: x / Non Sq Epi: x / Bacteria: x        RADIOLOGY & ADDITIONAL TESTS:    Imaging Personally Reviewed:  [ ] YES  [ ] NO
CARLOS DEVRIES  89y  Female      Patient is a 89y old  Female who presents with a chief complaint of uti/ change of mental status (06 May 2025 12:06)      INTERVAL HPI/OVERNIGHT EVENTS:        REVIEW OF SYSTEMS:  CONSTITUTIONAL: No fever, weight loss, or fatigue  EYES: No eye pain, visual disturbances, or discharge  ENMT:   No sinus or throat pain  NECK: No pain or stiffness  BREASTS: No pain, masses, or nipple discharge  RESPIRATORY: No cough, wheezing, chills or hemoptysis; No shortness of breath  CARDIOVASCULAR: No chest pain, palpitations, dizziness, or leg swelling  GASTROINTESTINAL: No abdominal or epigastric pain. No nausea, vomiting, or hematemesis; No diarrhea or constipation. No melena or hematochezia.  GENITOURINARY: No hematuria  NEUROLOGICAL: demented    SKIN: No itching, burning, rashes, or lesions   LYMPH NODES: No enlarged glands  ENDOCRINE: No heat or cold intolerance; No hair loss  MUSCULOSKELETAL: No joint pain    HEME/LYMPH: No easy bruising, or bleeding gums  ALLERY AND IMMUNOLOGIC: No hives or eczema    T(C): 36.6 (05-06-25 @ 11:03), Max: 36.8 (05-05-25 @ 16:38)  HR: 88 (05-06-25 @ 11:03) (77 - 88)  BP: 120/78 (05-06-25 @ 11:03) (119/75 - 147/69)  RR: 18 (05-06-25 @ 11:03) (18 - 18)  SpO2: 96% (05-06-25 @ 11:03) (95% - 97%)  Wt(kg): --Vital Signs Last 24 Hrs  T(C): 36.6 (06 May 2025 11:03), Max: 36.8 (05 May 2025 16:38)  T(F): 97.8 (06 May 2025 11:03), Max: 98.2 (05 May 2025 16:38)  HR: 88 (06 May 2025 11:03) (77 - 88)  BP: 120/78 (06 May 2025 11:03) (119/75 - 147/69)  BP(mean): --  RR: 18 (06 May 2025 11:03) (18 - 18)  SpO2: 96% (06 May 2025 11:03) (95% - 97%)    Parameters below as of 06 May 2025 11:03  Patient On (Oxygen Delivery Method): room air        PHYSICAL EXAM:  GENERAL: NAD, well-groomed, well-developed  HEAD:  Atraumatic, Normocephalic  EYES: EOMI, PERRLA, conjunctiva and sclera clear  ENMT: No tonsillar erythema, exudates, or enlargement; Moist mucous membranes, Good dentition, No lesions  NECK: Supple, No JVD, Normal thyroid  NERVOUS SYSTEM:  demented.no focal neurodeficit.  CHEST/LUNG: Clear to percussion bilaterally; No rales, rhonchi, wheezing, or rubs  HEART: Regular rate and rhythm; No murmurs, rubs, or gallops  ABDOMEN: Soft, Nontender, Nondistended; Bowel sounds present  EXTREMITIES:  2+ Peripheral Pulses, No clubbing, cyanosis.  LYMPH: No lymphadenopathy noted  SKIN: No rashes or lesions    Consultant(s) Notes Reviewed:  [x ] YES  [ ] NO  Care Discussed with Consultants/Other Providers [ x] YES  [ ] NO    LABS:                        13.4   9.75  )-----------( 247      ( 05 May 2025 06:35 )             41.1     05-05    143  |  109[H]  |  29[H]  ----------------------------<  157[H]  4.0   |  22  |  0.54    Ca    9.5      05 May 2025 06:35        Urinalysis Basic - ( 05 May 2025 06:35 )    Color: x / Appearance: x / SG: x / pH: x  Gluc: 157 mg/dL / Ketone: x  / Bili: x / Urobili: x   Blood: x / Protein: x / Nitrite: x   Leuk Esterase: x / RBC: x / WBC x   Sq Epi: x / Non Sq Epi: x / Bacteria: x      CAPILLARY BLOOD GLUCOSE      POCT Blood Glucose.: 169 mg/dL (06 May 2025 11:26)  POCT Blood Glucose.: 138 mg/dL (06 May 2025 06:31)  POCT Blood Glucose.: 135 mg/dL (06 May 2025 00:42)  POCT Blood Glucose.: 144 mg/dL (05 May 2025 17:08)        Urinalysis Basic - ( 05 May 2025 06:35 )    Color: x / Appearance: x / SG: x / pH: x  Gluc: 157 mg/dL / Ketone: x  / Bili: x / Urobili: x   Blood: x / Protein: x / Nitrite: x   Leuk Esterase: x / RBC: x / WBC x   Sq Epi: x / Non Sq Epi: x / Bacteria: x        RADIOLOGY & ADDITIONAL TESTS:    Imaging Personally Reviewed:  [ ] YES  [ ] NO
CARLOS DEVRIES  89y  Female      Patient is a 89y old  Female who presents with a chief complaint of uti/ change of mental status (05 May 2025 11:07)      INTERVAL HPI/OVERNIGHT EVENTS:        REVIEW OF SYSTEMS:  CONSTITUTIONAL: No fever, weight loss, or fatigue  EYES: No eye pain, visual disturbances, or discharge  ENMT:   No sinus or throat pain  NECK: No pain or stiffness  BREASTS: No pain, masses, or nipple discharge  RESPIRATORY: No cough, wheezing, chills or hemoptysis; No shortness of breath  CARDIOVASCULAR: No chest pain, palpitations, dizziness, or leg swelling  GASTROINTESTINAL: No abdominal or epigastric pain. No nausea, vomiting, or hematemesis; No diarrhea or constipation. No melena or hematochezia.  GENITOURINARY: No hematuria  NEUROLOGICAL: demented    SKIN: No itching, burning, rashes, or lesions   LYMPH NODES: No enlarged glands  ENDOCRINE: No heat or cold intolerance; No hair loss  MUSCULOSKELETAL: No joint pain    HEME/LYMPH: No easy bruising, or bleeding gums  ALLERY AND IMMUNOLOGIC: No hives or eczema    T(C): 36.6 (05-05-25 @ 11:11), Max: 36.8 (05-04-25 @ 18:55)  HR: 81 (05-05-25 @ 11:11) (81 - 91)  BP: 115/72 (05-05-25 @ 11:11) (115/72 - 130/81)  RR: 18 (05-05-25 @ 11:11) (18 - 18)  SpO2: 95% (05-05-25 @ 11:11) (95% - 96%)  Wt(kg): --Vital Signs Last 24 Hrs  T(C): 36.6 (05 May 2025 11:11), Max: 36.8 (04 May 2025 18:55)  T(F): 97.9 (05 May 2025 11:11), Max: 98.3 (04 May 2025 18:55)  HR: 81 (05 May 2025 11:11) (81 - 91)  BP: 115/72 (05 May 2025 11:11) (115/72 - 130/81)  BP(mean): --  RR: 18 (05 May 2025 11:11) (18 - 18)  SpO2: 95% (05 May 2025 11:11) (95% - 96%)    Parameters below as of 05 May 2025 11:11  Patient On (Oxygen Delivery Method): room air        PHYSICAL EXAM:  GENERAL: NAD, well-groomed, well-developed  HEAD:  Atraumatic, Normocephalic  EYES: EOMI, PERRLA, conjunctiva and sclera clear  ENMT: No tonsillar erythema, exudates, or enlargement; Moist mucous membranes, Good dentition, No lesions  NECK: Supple, No JVD, Normal thyroid  NERVOUS SYSTEM:  demented.no focal neurodeficit.  CHEST/LUNG: Clear to percussion bilaterally; No rales, rhonchi, wheezing, or rubs  HEART: Regular rate and rhythm; No murmurs, rubs, or gallops  ABDOMEN: Soft, Nontender, Nondistended; Bowel sounds present  EXTREMITIES:  2+ Peripheral Pulses, No clubbing, cyanosis.  LYMPH: No lymphadenopathy noted  SKIN: No rashes or lesions    Consultant(s) Notes Reviewed:  [x ] YES  [ ] NO  Care Discussed with Consultants/Other Providers [ x] YES  [ ] NO    LABS:                        13.4   9.75  )-----------( 247      ( 05 May 2025 06:35 )             41.1     05-05    143  |  109[H]  |  29[H]  ----------------------------<  157[H]  4.0   |  22  |  0.54    Ca    9.5      05 May 2025 06:35        Urinalysis Basic - ( 05 May 2025 06:35 )    Color: x / Appearance: x / SG: x / pH: x  Gluc: 157 mg/dL / Ketone: x  / Bili: x / Urobili: x   Blood: x / Protein: x / Nitrite: x   Leuk Esterase: x / RBC: x / WBC x   Sq Epi: x / Non Sq Epi: x / Bacteria: x      CAPILLARY BLOOD GLUCOSE      POCT Blood Glucose.: 161 mg/dL (05 May 2025 11:45)  POCT Blood Glucose.: 150 mg/dL (05 May 2025 06:07)  POCT Blood Glucose.: 141 mg/dL (04 May 2025 23:48)  POCT Blood Glucose.: 97 mg/dL (04 May 2025 18:13)        Urinalysis Basic - ( 05 May 2025 06:35 )    Color: x / Appearance: x / SG: x / pH: x  Gluc: 157 mg/dL / Ketone: x  / Bili: x / Urobili: x   Blood: x / Protein: x / Nitrite: x   Leuk Esterase: x / RBC: x / WBC x   Sq Epi: x / Non Sq Epi: x / Bacteria: x        RADIOLOGY & ADDITIONAL TESTS:    Imaging Personally Reviewed:  [ ] YES  [ ] NO

## 2025-05-08 NOTE — PROGRESS NOTE ADULT - REASON FOR ADMISSION
uti/ change of mental status

## 2025-05-08 NOTE — PROGRESS NOTE ADULT - PROVIDER SPECIALTY LIST ADULT
Gastroenterology
Infectious Disease
Infectious Disease
Cardiology
Gastroenterology
Infectious Disease
Gastroenterology
Gastroenterology
Internal Medicine
Internal Medicine
Cardiology
Internal Medicine
Internal Medicine
Cardiology
[place]
Internal Medicine
Cardiology
Internal Medicine
No

## 2025-05-08 NOTE — PROGRESS NOTE ADULT - PROBLEM SELECTOR PROBLEM 1
Acute UTI

## 2025-05-15 ENCOUNTER — APPOINTMENT (OUTPATIENT)
Dept: ORTHOPEDIC SURGERY | Facility: CLINIC | Age: 89
End: 2025-05-15
